# Patient Record
Sex: MALE | Race: WHITE | NOT HISPANIC OR LATINO | Employment: OTHER | URBAN - METROPOLITAN AREA
[De-identification: names, ages, dates, MRNs, and addresses within clinical notes are randomized per-mention and may not be internally consistent; named-entity substitution may affect disease eponyms.]

---

## 2019-01-08 ENCOUNTER — HOSPITAL ENCOUNTER (OUTPATIENT)
Dept: OTHER | Facility: HOSPITAL | Age: 69
Discharge: HOME | End: 2019-01-08

## 2019-01-08 LAB
INR PPP: 1.87 (ref 2–3)
PROTHROMBIN TIME: 17.8 S (ref 9.4–12)

## 2019-01-21 ENCOUNTER — HOSPITAL ENCOUNTER (OUTPATIENT)
Dept: OTHER | Facility: HOSPITAL | Age: 69
Discharge: HOME | End: 2019-01-21

## 2019-01-21 LAB
INR PPP: 2.36 (ref 2–3)
PROTHROMBIN TIME: 22.2 S (ref 9.4–12)

## 2019-02-05 ENCOUNTER — HOSPITAL ENCOUNTER (OUTPATIENT)
Dept: OTHER | Facility: HOSPITAL | Age: 69
Discharge: HOME | End: 2019-02-05

## 2019-02-05 LAB
INR PPP: 1.99 (ref 2–3)
PROTHROMBIN TIME: 18.9 S (ref 9.4–12)

## 2019-02-11 ENCOUNTER — HOSPITAL ENCOUNTER (OUTPATIENT)
Dept: OTHER | Facility: HOSPITAL | Age: 69
Discharge: HOME | End: 2019-02-11

## 2019-02-11 LAB
ALBUMIN SERPL-MCNC: 4.2 G/DL (ref 3.5–5)
ALBUMIN/GLOB SERPL: 1.6 {RATIO} (ref 1.4–2.6)
ALP SERPL-CCNC: 34 U/L (ref 56–155)
ALT SERPL-CCNC: 23 U/L (ref 10–40)
ANION GAP SERPL CALC-SCNC: 14 MMOL/L (ref 8–19)
AST SERPL-CCNC: 27 U/L (ref 15–50)
BASOPHILS # BLD AUTO: 0.01 10*3/UL (ref 0–0.2)
BASOPHILS NFR BLD AUTO: 0.15 % (ref 0–3)
BILIRUB SERPL-MCNC: 0.31 MG/DL (ref 0.2–1.3)
BUN SERPL-MCNC: 27 MG/DL (ref 5–25)
BUN/CREAT SERPL: 23 {RATIO} (ref 6–20)
CALCIUM SERPL-MCNC: 9 MG/DL (ref 8.7–10.4)
CHLORIDE SERPL-SCNC: 101 MMOL/L (ref 99–111)
CONV CO2: 28 MMOL/L (ref 22–32)
CONV TOTAL PROTEIN: 6.8 G/DL (ref 6.3–8.2)
CREAT UR-MCNC: 1.2 MG/DL (ref 0.7–1.2)
EOSINOPHIL # BLD AUTO: 0.14 10*3/UL (ref 0–0.7)
EOSINOPHIL # BLD AUTO: 2.05 % (ref 0–7)
ERYTHROCYTE [DISTWIDTH] IN BLOOD BY AUTOMATED COUNT: 11.2 % (ref 11.5–14.5)
GFR SERPLBLD BASED ON 1.73 SQ M-ARVRAT: >60 ML/MIN/{1.73_M2}
GLOBULIN UR ELPH-MCNC: 2.6 G/DL (ref 2–3.5)
GLUCOSE SERPL-MCNC: 99 MG/DL (ref 70–99)
HBA1C MFR BLD: 13.5 G/DL (ref 14–18)
HCT VFR BLD AUTO: 39.6 % (ref 42–52)
INR PPP: 2.04 (ref 2–3)
LYMPHOCYTES # BLD AUTO: 2.06 10*3/UL (ref 1–5)
MCH RBC QN AUTO: 32.6 PG (ref 27–31)
MCHC RBC AUTO-ENTMCNC: 34.1 G/DL (ref 33–37)
MCV RBC AUTO: 95.6 FL (ref 80–96)
MONOCYTES # BLD AUTO: 0.61 10*3/UL (ref 0.2–1.2)
MONOCYTES NFR BLD AUTO: 8.68 % (ref 3–10)
NEUTROPHILS # BLD AUTO: 4.16 10*3/UL (ref 2–8)
NEUTROPHILS NFR BLD AUTO: 59.6 % (ref 30–85)
NRBC BLD AUTO-RTO: 0 % (ref 0–0.01)
OSMOLALITY SERPL CALC.SUM OF ELEC: 293 MOSM/KG (ref 273–304)
PLATELET # BLD AUTO: 251 10*3/UL (ref 130–400)
PMV BLD AUTO: 6.7 FL (ref 7.4–10.4)
POTASSIUM SERPL-SCNC: 4.2 MMOL/L (ref 3.5–5.3)
PROTHROMBIN TIME: 19.4 S (ref 9.4–12)
RBC # BLD AUTO: 4.14 10*6/UL (ref 4.7–6.1)
SODIUM SERPL-SCNC: 139 MMOL/L (ref 135–147)
TSH SERPL-ACNC: 2.31 M[IU]/L (ref 0.27–4.2)
VARIANT LYMPHS NFR BLD MANUAL: 29.5 % (ref 20–45)
WBC # BLD AUTO: 6.98 10*3/UL (ref 4.8–10.8)

## 2019-02-25 ENCOUNTER — HOSPITAL ENCOUNTER (OUTPATIENT)
Dept: OTHER | Facility: HOSPITAL | Age: 69
Discharge: HOME | End: 2019-02-25

## 2019-02-25 LAB
INR PPP: 1.91 (ref 2–3)
PROTHROMBIN TIME: 18.2 S (ref 9.4–12)

## 2019-03-04 ENCOUNTER — HOSPITAL ENCOUNTER (OUTPATIENT)
Dept: OTHER | Facility: HOSPITAL | Age: 69
Discharge: HOME | End: 2019-03-04

## 2019-03-04 LAB
INR PPP: 2.39 (ref 2–3)
PROTHROMBIN TIME: 22.4 S (ref 9.4–12)

## 2019-03-20 ENCOUNTER — HOSPITAL ENCOUNTER (OUTPATIENT)
Dept: OTHER | Facility: HOSPITAL | Age: 69
Discharge: HOME | End: 2019-03-20

## 2019-03-20 LAB
INR PPP: 1.94 (ref 2–3)
PROTHROMBIN TIME: 18.5 S (ref 9.4–12)

## 2019-04-04 ENCOUNTER — HOSPITAL ENCOUNTER (OUTPATIENT)
Dept: OTHER | Facility: HOSPITAL | Age: 69
Discharge: HOME | End: 2019-04-04

## 2019-04-04 LAB
INR PPP: 1.45 (ref 2–3)
PROTHROMBIN TIME: 14.2 S (ref 9.4–12)

## 2019-05-13 ENCOUNTER — HOSPITAL ENCOUNTER (OUTPATIENT)
Dept: OTHER | Facility: HOSPITAL | Age: 69
Discharge: HOME | End: 2019-05-13

## 2019-05-13 DIAGNOSIS — E78.5 HYPERLIPIDEMIA, UNSPECIFIED HYPERLIPIDEMIA TYPE: ICD-10-CM

## 2019-05-13 DIAGNOSIS — I48.20 ATRIAL FIBRILLATION, CHRONIC (HCC): Primary | ICD-10-CM

## 2019-05-13 LAB
INR PPP: 1.84 (ref 2–3)
PROTHROMBIN TIME: 17.6 S (ref 9.4–12)

## 2019-05-17 ENCOUNTER — RESULTS ENCOUNTER (OUTPATIENT)
Dept: CARDIOLOGY | Facility: CLINIC | Age: 69
End: 2019-05-17

## 2019-05-17 DIAGNOSIS — I48.20 ATRIAL FIBRILLATION, CHRONIC (HCC): ICD-10-CM

## 2019-05-22 ENCOUNTER — OFFICE VISIT (OUTPATIENT)
Dept: CARDIOLOGY | Facility: CLINIC | Age: 69
End: 2019-05-22

## 2019-05-22 VITALS
SYSTOLIC BLOOD PRESSURE: 112 MMHG | HEART RATE: 50 BPM | BODY MASS INDEX: 24.08 KG/M2 | DIASTOLIC BLOOD PRESSURE: 72 MMHG | WEIGHT: 172 LBS | HEIGHT: 71 IN

## 2019-05-22 DIAGNOSIS — I48.0 PAROXYSMAL ATRIAL FIBRILLATION (HCC): ICD-10-CM

## 2019-05-22 DIAGNOSIS — I10 ESSENTIAL HYPERTENSION: ICD-10-CM

## 2019-05-22 DIAGNOSIS — E78.5 HYPERLIPIDEMIA LDL GOAL <100: ICD-10-CM

## 2019-05-22 DIAGNOSIS — I48.0 PAF (PAROXYSMAL ATRIAL FIBRILLATION) (HCC): Primary | ICD-10-CM

## 2019-05-22 PROBLEM — E03.9 THYROID ACTIVITY DECREASED: Status: ACTIVE | Noted: 2019-05-22

## 2019-05-22 PROBLEM — R00.1 BRADYCARDIA, SINUS: Status: ACTIVE | Noted: 2019-05-22

## 2019-05-22 PROCEDURE — 93000 ELECTROCARDIOGRAM COMPLETE: CPT | Performed by: INTERNAL MEDICINE

## 2019-05-22 PROCEDURE — 99213 OFFICE O/P EST LOW 20 MIN: CPT | Performed by: INTERNAL MEDICINE

## 2019-05-22 RX ORDER — IRBESARTAN 300 MG/1
300 TABLET ORAL DAILY
COMMUNITY
Start: 2018-01-07 | End: 2019-05-22 | Stop reason: SDUPTHER

## 2019-05-22 RX ORDER — PRAVASTATIN SODIUM 40 MG
40 TABLET ORAL DAILY
COMMUNITY
Start: 2018-01-21 | End: 2019-05-22 | Stop reason: SDUPTHER

## 2019-05-22 RX ORDER — LEVOTHYROXINE SODIUM 0.03 MG/1
37.5 TABLET ORAL DAILY
COMMUNITY
Start: 2018-01-19 | End: 2021-08-25

## 2019-05-22 RX ORDER — FINASTERIDE 5 MG/1
5 TABLET, FILM COATED ORAL DAILY
COMMUNITY
Start: 2018-01-09 | End: 2021-08-11

## 2019-05-22 RX ORDER — SULFASALAZINE 500 MG/1
500 TABLET ORAL DAILY
COMMUNITY
Start: 2017-10-31 | End: 2021-10-10

## 2019-05-22 RX ORDER — SOTALOL HYDROCHLORIDE 120 MG/1
60 TABLET ORAL 2 TIMES DAILY
Qty: 60 TABLET | Refills: 11 | Status: SHIPPED | OUTPATIENT
Start: 2019-05-22 | End: 2019-07-21

## 2019-05-22 RX ORDER — PRAVASTATIN SODIUM 40 MG
40 TABLET ORAL DAILY
Qty: 30 TABLET | Refills: 11 | Status: SHIPPED | OUTPATIENT
Start: 2019-05-22 | End: 2019-06-21

## 2019-05-22 RX ORDER — WARFARIN SODIUM 5 MG/1
5 TABLET ORAL NIGHTLY
Qty: 60 TABLET | Refills: 11 | Status: SHIPPED | OUTPATIENT
Start: 2019-05-22 | End: 2020-07-01

## 2019-05-22 RX ORDER — WARFARIN SODIUM 5 MG/1
5 TABLET ORAL
COMMUNITY
Start: 2018-01-26 | End: 2019-05-22 | Stop reason: SDUPTHER

## 2019-05-22 RX ORDER — SOTALOL HYDROCHLORIDE 120 MG/1
60 TABLET ORAL 2 TIMES DAILY
COMMUNITY
End: 2019-05-22 | Stop reason: SDUPTHER

## 2019-05-22 RX ORDER — IRBESARTAN 300 MG/1
300 TABLET ORAL DAILY
Qty: 30 TABLET | Refills: 11 | Status: SHIPPED | OUTPATIENT
Start: 2019-05-22 | End: 2019-06-21

## 2019-05-22 NOTE — ASSESSMENT & PLAN NOTE
He states that he has not had a lipid profile in over a year.  I will give him a lab slip to have this obtained

## 2019-05-22 NOTE — ASSESSMENT & PLAN NOTE
Currently in sinus rhythm.  I have refilled sotalol and warfarin.  His INR goal is 2-3  EKG in the office revealed sinus bradycardia, rate 50.  Corrected QT interval is 425 ms.  Nonspecific ST changes

## 2019-05-22 NOTE — PROGRESS NOTES
Subjective:     Encounter Date:05/22/2019      Patient ID: Victor Manuel Mendoza is a 69 y.o. male.    Chief Complaint:  Chief Complaint   Patient presents with   • Atrial Fibrillation   • Coronary Artery Disease       HPI:  I had the pleasure seeing Mr. Mendoza in the office today.  He is a 69-year-old gentleman with paroxysmal atrial fibrillation, hypertension and hyperlipidemia.  He states that he recently went to [place].  After he arrived he had a couple episodes of atrial fibrillation.  The duration was approximately 8 hours in total.  He was aware of the arrhythmia but had no significant shortness of air, chest discomfort or lower extremity edema.  He had no associated dizziness or near syncope.  He states that he felt that the atrial fibrillation was most likely related to the altitude and increased caffeine intake.  He is not complain of chest discomfort or shortness of air.  He is stable.  He is on warfarin and sotalol.  His blood pressure is well controlled.    The following portions of the patient's history were reviewed and updated as appropriate: allergies, current medications, past family history, past medical history, past social history, past surgical history and problem list.    Problem List:  Patient Active Problem List   Diagnosis   • Paroxysmal atrial fibrillation (CMS/HCC)   • Essential hypertension   • Hyperlipidemia LDL goal <100   • Thyroid activity decreased   • Bradycardia, sinus       Past Medical History:  Past Medical History:   Diagnosis Date   • Atrial fibrillation (CMS/HCC)    • Coronary artery disease    • Hyperlipidemia    • Hypertension    • Hypothyroidism        Past Surgical History:  Past Surgical History:   Procedure Laterality Date   • SHOULDER SURGERY     • SPINE SURGERY         Social History:  Social History     Socioeconomic History   • Marital status:      Spouse name: Not on file   • Number of children: Not on file   • Years of education: Not on file   • Highest  education level: Not on file   Tobacco Use   • Smoking status: Never Smoker   Substance and Sexual Activity   • Alcohol use: Yes     Drinks per session: 1 or 2   • Drug use: No   • Sexual activity: No     Partners: Female       Allergies:  No Known Allergies        ROS:  Review of Systems   Constitution: Negative for chills, decreased appetite, fever, malaise/fatigue, weight gain and weight loss.   HENT: Negative for congestion, hoarse voice, nosebleeds and sore throat.    Eyes: Negative for blurred vision, double vision and visual disturbance.   Cardiovascular: Positive for irregular heartbeat. Negative for chest pain, claudication, dyspnea on exertion, leg swelling, near-syncope, orthopnea, palpitations, paroxysmal nocturnal dyspnea and syncope.   Respiratory: Negative for cough, hemoptysis, shortness of breath, sleep disturbances due to breathing, snoring, sputum production and wheezing.    Endocrine: Negative for cold intolerance, heat intolerance, polydipsia and polyuria.   Hematologic/Lymphatic: Negative for adenopathy and bleeding problem. Bruises/bleeds easily.   Skin: Negative for flushing, itching, nail changes and rash.   Musculoskeletal: Positive for arthritis. Negative for back pain, joint pain, muscle cramps, muscle weakness, myalgias and neck pain.   Gastrointestinal: Positive for heartburn. Negative for bloating, abdominal pain, anorexia, change in bowel habit, constipation, diarrhea, hematemesis, hematochezia, jaundice, melena, nausea and vomiting.   Genitourinary: Negative for dysuria, hematuria and nocturia.   Neurological: Negative for brief paralysis, disturbances in coordination, excessive daytime sleepiness, dizziness, headaches, light-headedness, loss of balance, numbness, paresthesias, seizures and vertigo.   Psychiatric/Behavioral: Negative for altered mental status and depression. The patient is not nervous/anxious.    Allergic/Immunologic: Negative for environmental allergies and hives.  "         Objective:         /72   Pulse 50   Ht 180.3 cm (71\")   Wt 78 kg (172 lb)   BMI 23.99 kg/m²     Physical Exam   Constitutional: He is oriented to person, place, and time. He appears well-developed and well-nourished. No distress.   HENT:   Head: Normocephalic and atraumatic.   Mouth/Throat: Oropharynx is clear and moist.   Eyes: Conjunctivae and EOM are normal. Pupils are equal, round, and reactive to light. No scleral icterus.   Neck: Normal range of motion. Neck supple. No thyromegaly present.   Cardiovascular: Normal rate, regular rhythm, S1 normal, S2 normal and intact distal pulses.  No extrasystoles are present. PMI is not displaced. Exam reveals no gallop, no S3, no S4, no friction rub and no decreased pulses.   No murmur heard.  Pulses:       Carotid pulses are 2+ on the right side, and 2+ on the left side.       Dorsalis pedis pulses are 2+ on the right side, and 2+ on the left side.        Posterior tibial pulses are 2+ on the right side, and 2+ on the left side.   Pulmonary/Chest: Effort normal and breath sounds normal. No respiratory distress. He has no wheezes. He has no rales.   Abdominal: Soft. Bowel sounds are normal. He exhibits no distension and no mass. There is no tenderness. There is no rebound and no guarding.   Musculoskeletal: Normal range of motion. He exhibits no edema.   Lymphadenopathy:     He has no cervical adenopathy.   Neurological: He is alert and oriented to person, place, and time. Coordination normal.   Skin: Skin is warm and dry. No rash noted. He is not diaphoretic. No pallor.   Psychiatric: He has a normal mood and affect. His behavior is normal.   Nursing note and vitals reviewed.      In-Office Procedure(s):    ECG 12 Lead  Date/Time: 5/22/2019 12:38 PM  Performed by: Danielle Scott MD  Authorized by: Danielle Scott MD   Rhythm: sinus bradycardia  Other findings: non-specific ST-T wave changes  Comments: Corrected QT interval 425 ms            ASCVD " RIsk Score::  The ASCVD Risk score (Libbykarla MONZON Jr., et al., 2013) failed to calculate for the following reasons:    Cannot find a previous HDL lab    Cannot find a previous total cholesterol lab    Recent Radiology:  Imaging Results (most recent)     None          Lab Review:   No visits with results within 6 Month(s) from this visit.   Latest known visit with results is:   No results found for any previous visit.                    Problems Addressed this Visit        Cardiovascular and Mediastinum    Paroxysmal atrial fibrillation (CMS/HCC)     Currently in sinus rhythm.  I have refilled sotalol and warfarin.  His INR goal is 2-3  EKG in the office revealed sinus bradycardia, rate 50.  Corrected QT interval is 425 ms.  Nonspecific ST changes         Relevant Medications    sotalol (BETAPACE) 120 MG tablet    Other Relevant Orders    ECG 12 Lead    Essential hypertension     Controlled.  I did refill his Avapro         Relevant Medications    irbesartan (AVAPRO) 300 MG tablet    sotalol (BETAPACE) 120 MG tablet    Hyperlipidemia LDL goal <100     He states that he has not had a lipid profile in over a year.  I will give him a lab slip to have this obtained         Relevant Medications    pravastatin (PRAVACHOL) 40 MG tablet    Other Relevant Orders    Lipid Panel      Other Visit Diagnoses     PAF (paroxysmal atrial fibrillation) (CMS/HCC)    -  Primary    Relevant Medications    sotalol (BETAPACE) 120 MG tablet    warfarin (COUMADIN) 5 MG tablet          Danielle Scott MD  05/22/19  .

## 2019-05-23 ENCOUNTER — ANTICOAGULATION VISIT (OUTPATIENT)
Dept: CARDIOLOGY | Facility: CLINIC | Age: 69
End: 2019-05-23

## 2019-05-23 ENCOUNTER — HOSPITAL ENCOUNTER (OUTPATIENT)
Dept: OTHER | Facility: HOSPITAL | Age: 69
Discharge: HOME | End: 2019-05-23

## 2019-05-23 DIAGNOSIS — I48.0 PAROXYSMAL ATRIAL FIBRILLATION (HCC): ICD-10-CM

## 2019-05-23 DIAGNOSIS — Z79.01 LONG TERM (CURRENT) USE OF ANTICOAGULANTS: ICD-10-CM

## 2019-05-23 LAB
CHOLEST SERPL-MCNC: 140 MG/DL (ref 107–200)
CHOLEST/HDLC SERPL: 2.9 {RATIO} (ref 3–6)
HDLC SERPL-MCNC: 48 MG/DL (ref 40–60)
INR PPP: 3.2 (ref 2–3)
INR PPP: 3.21 (ref 2–3)
LDLC SERPL CALC-MCNC: 73 MG/DL (ref 70–100)
PROTHROMBIN TIME: 29.8 S (ref 9.4–12)
TRIGL SERPL-MCNC: 94 MG/DL (ref 40–150)
VLDLC SERPL-MCNC: 19 MG/DL (ref 5–37)

## 2019-05-24 PROBLEM — Z79.01 LONG TERM (CURRENT) USE OF ANTICOAGULANTS: Status: ACTIVE | Noted: 2019-05-24

## 2019-06-06 ENCOUNTER — HOSPITAL ENCOUNTER (OUTPATIENT)
Dept: OTHER | Facility: HOSPITAL | Age: 69
Discharge: HOME | End: 2019-06-06
Attending: FAMILY MEDICINE

## 2019-06-06 LAB
BASOPHILS # BLD AUTO: 0.01 10*3/UL (ref 0–0.2)
BASOPHILS NFR BLD AUTO: 0.2 % (ref 0–3)
CONV ABS IMM GRAN: 0.02 10*3/UL (ref 0–0.2)
CONV IMMATURE GRAN: 0.5 % (ref 0–1.8)
DEPRECATED RDW RBC AUTO: 46.2 FL (ref 35.1–43.9)
EOSINOPHIL # BLD AUTO: 0.12 10*3/UL (ref 0–0.7)
EOSINOPHIL # BLD AUTO: 2.8 % (ref 0–7)
ERYTHROCYTE [DISTWIDTH] IN BLOOD BY AUTOMATED COUNT: 12.5 % (ref 11.6–14.4)
HBA1C MFR BLD: 12.8 G/DL (ref 14–18)
HCT VFR BLD AUTO: 39.2 % (ref 42–52)
INR PPP: 2.49 (ref 2–3)
LYMPHOCYTES # BLD AUTO: 1.53 10*3/UL (ref 1–5)
MCH RBC QN AUTO: 32.8 PG (ref 27–31)
MCHC RBC AUTO-ENTMCNC: 32.7 G/DL (ref 33–37)
MCV RBC AUTO: 100.5 FL (ref 80–96)
MONOCYTES # BLD AUTO: 0.49 10*3/UL (ref 0.2–1.2)
MONOCYTES NFR BLD AUTO: 11.3 % (ref 3–10)
NEUTROPHILS # BLD AUTO: 2.17 10*3/UL (ref 2–8)
NEUTROPHILS NFR BLD AUTO: 49.9 % (ref 30–85)
NRBC CBCN: 0 % (ref 0–0.7)
PLATELET # BLD AUTO: 220 10*3/UL (ref 130–400)
PMV BLD AUTO: 9.7 FL (ref 9.4–12.4)
PROTHROMBIN TIME: 23.4 S (ref 9.4–12)
RBC # BLD AUTO: 3.9 10*6/UL (ref 4.7–6.1)
TSH SERPL-ACNC: 2.55 M[IU]/L (ref 0.27–4.2)
VARIANT LYMPHS NFR BLD MANUAL: 35.3 % (ref 20–45)
WBC # BLD AUTO: 4.34 10*3/UL (ref 4.8–10.8)

## 2019-06-07 LAB — HCV AB SER DONR QL: <0.1 S/CO RATIO (ref 0–0.9)

## 2019-07-09 ENCOUNTER — HOSPITAL ENCOUNTER (OUTPATIENT)
Dept: OTHER | Facility: HOSPITAL | Age: 69
Discharge: HOME | End: 2019-07-09

## 2019-07-09 LAB
INR PPP: 1.78 (ref 2–3)
PROTHROMBIN TIME: 17 S (ref 9.4–12)

## 2019-09-09 ENCOUNTER — HOSPITAL ENCOUNTER (OUTPATIENT)
Dept: OTHER | Facility: HOSPITAL | Age: 69
Discharge: HOME | End: 2019-09-09

## 2019-09-09 LAB
INR PPP: 2.6
INR PPP: 2.6 (ref 2–3)
PROTHROMBIN TIME: 24.3 S (ref 9.4–12)

## 2019-09-10 ENCOUNTER — ANTICOAGULATION VISIT (OUTPATIENT)
Dept: CARDIOLOGY | Facility: CLINIC | Age: 69
End: 2019-09-10

## 2019-09-10 DIAGNOSIS — I48.0 PAROXYSMAL ATRIAL FIBRILLATION (HCC): ICD-10-CM

## 2019-09-10 DIAGNOSIS — Z79.01 LONG TERM (CURRENT) USE OF ANTICOAGULANTS: ICD-10-CM

## 2019-10-14 ENCOUNTER — HOSPITAL ENCOUNTER (OUTPATIENT)
Dept: OTHER | Facility: HOSPITAL | Age: 69
Discharge: HOME | End: 2019-10-14

## 2019-10-14 LAB
INR PPP: 1.72 (ref 2–3)
PROTHROMBIN TIME: 17.2 S (ref 9.4–12)

## 2019-10-15 LAB — INR PPP: 1.72

## 2019-10-16 ENCOUNTER — ANTICOAGULATION VISIT (OUTPATIENT)
Dept: CARDIOLOGY | Facility: CLINIC | Age: 69
End: 2019-10-16

## 2019-10-16 DIAGNOSIS — I48.0 PAROXYSMAL ATRIAL FIBRILLATION (HCC): ICD-10-CM

## 2019-10-16 DIAGNOSIS — Z79.01 LONG TERM (CURRENT) USE OF ANTICOAGULANTS: ICD-10-CM

## 2019-11-05 ENCOUNTER — HOSPITAL ENCOUNTER (OUTPATIENT)
Dept: OTHER | Facility: HOSPITAL | Age: 69
Discharge: HOME | End: 2019-11-05

## 2019-11-05 LAB
INR PPP: 2.74 (ref 2–3)
PROTHROMBIN TIME: 26.6 S (ref 9.4–12)

## 2019-11-08 ENCOUNTER — HOSPITAL ENCOUNTER (OUTPATIENT)
Dept: SLEEP MEDICINE | Facility: HOSPITAL | Age: 69
Discharge: HOME | End: 2019-11-08
Attending: INTERNAL MEDICINE

## 2019-11-12 ENCOUNTER — HOSPITAL ENCOUNTER (OUTPATIENT)
Dept: OTHER | Facility: HOSPITAL | Age: 69
Discharge: HOME | End: 2019-11-12

## 2019-11-12 LAB
INR PPP: 2.88 (ref 2–3)
INR PPP: 2.88 (ref 2–3)
PROTHROMBIN TIME: 28 S (ref 9.4–12)

## 2019-11-13 ENCOUNTER — OFFICE VISIT (OUTPATIENT)
Dept: CARDIOLOGY | Facility: CLINIC | Age: 69
End: 2019-11-13

## 2019-11-13 ENCOUNTER — ANTICOAGULATION VISIT (OUTPATIENT)
Dept: CARDIOLOGY | Facility: CLINIC | Age: 69
End: 2019-11-13

## 2019-11-13 VITALS
BODY MASS INDEX: 25.06 KG/M2 | SYSTOLIC BLOOD PRESSURE: 138 MMHG | WEIGHT: 179 LBS | DIASTOLIC BLOOD PRESSURE: 88 MMHG | HEIGHT: 71 IN | HEART RATE: 53 BPM

## 2019-11-13 DIAGNOSIS — Z79.899 LONG TERM CURRENT USE OF ANTIARRHYTHMIC MEDICAL THERAPY: Primary | ICD-10-CM

## 2019-11-13 DIAGNOSIS — E78.5 DYSLIPIDEMIA: ICD-10-CM

## 2019-11-13 DIAGNOSIS — I48.0 PAROXYSMAL ATRIAL FIBRILLATION (HCC): ICD-10-CM

## 2019-11-13 DIAGNOSIS — R00.1 BRADYCARDIA, SINUS: ICD-10-CM

## 2019-11-13 DIAGNOSIS — I10 ESSENTIAL HYPERTENSION: ICD-10-CM

## 2019-11-13 DIAGNOSIS — Z79.01 LONG TERM (CURRENT) USE OF ANTICOAGULANTS: ICD-10-CM

## 2019-11-13 DIAGNOSIS — E78.5 HYPERLIPIDEMIA LDL GOAL <100: ICD-10-CM

## 2019-11-13 PROCEDURE — 99213 OFFICE O/P EST LOW 20 MIN: CPT | Performed by: INTERNAL MEDICINE

## 2019-11-13 PROCEDURE — 93000 ELECTROCARDIOGRAM COMPLETE: CPT | Performed by: INTERNAL MEDICINE

## 2019-11-13 RX ORDER — SOTALOL HYDROCHLORIDE 120 MG/1
0.5 TABLET ORAL 2 TIMES DAILY
Refills: 3 | COMMUNITY
Start: 2019-10-18 | End: 2019-11-13

## 2019-11-13 RX ORDER — CITALOPRAM 10 MG/1
1 TABLET ORAL DAILY
Refills: 5 | COMMUNITY
Start: 2019-10-11 | End: 2021-12-03

## 2019-11-13 RX ORDER — IRBESARTAN 300 MG/1
1 TABLET ORAL DAILY
Refills: 0 | COMMUNITY
Start: 2019-10-11 | End: 2019-11-14 | Stop reason: SDUPTHER

## 2019-11-13 RX ORDER — PRAVASTATIN SODIUM 40 MG
40 TABLET ORAL DAILY
Refills: 11 | COMMUNITY
Start: 2019-11-08 | End: 2020-08-13

## 2019-11-13 NOTE — PROGRESS NOTES
"  Subjective:     Encounter Date:11/13/2019      Patient ID: Victor Manuel Mendoza is a 69 y.o. male.    Chief Complaint:  Chief Complaint   Patient presents with   • Atrial Fibrillation       HPI:  History of Present Illness    The following portions of the patient's history were reviewed and updated as appropriate: {history reviewed:20406::\"allergies\",\"current medications\",\"past family history\",\"past medical history\",\"past social history\",\"past surgical history\",\"problem list\"}.    Problem List:  Patient Active Problem List   Diagnosis   • Paroxysmal atrial fibrillation (CMS/HCC)   • Essential hypertension   • Hyperlipidemia LDL goal <100   • Thyroid activity decreased   • Bradycardia, sinus   • Long term (current) use of anticoagulants [Z79.01]       Past Medical History:  Past Medical History:   Diagnosis Date   • Atrial fibrillation (CMS/HCC)    • Coronary artery disease    • Hyperlipidemia    • Hypertension    • Hypothyroidism        Past Surgical History:  Past Surgical History:   Procedure Laterality Date   • SHOULDER SURGERY     • SPINE SURGERY         Social History:  Social History     Socioeconomic History   • Marital status:      Spouse name: Not on file   • Number of children: Not on file   • Years of education: Not on file   • Highest education level: Not on file   Tobacco Use   • Smoking status: Never Smoker   Substance and Sexual Activity   • Alcohol use: Yes     Drinks per session: 1 or 2   • Drug use: No   • Sexual activity: No     Partners: Female       Allergies:  No Known Allergies    Immunizations:    There is no immunization history on file for this patient.    ROS:  ROS       Objective:         /88   Pulse 53   Ht 180.3 cm (71\")   Wt 81.2 kg (179 lb)   BMI 24.97 kg/m²     Physical Exam    In-Office Procedure(s):  Procedures    ASCVD RIsk Score::  The ASCVD Risk score (Libby NA Daugherty, et al., 2013) failed to calculate for the following reasons:    Cannot find a previous HDL lab    Cannot " "find a previous total cholesterol lab    Recent Radiology:  Imaging Results (Most Recent)     None          Lab Review:   {Recent labs:67406::\"not applicable\"}             Assessment:         No diagnosis found.       Plan:            Level of Care:                 Danielle Scott MD  11/13/19  .  "

## 2019-11-13 NOTE — PROGRESS NOTES
Subjective:     Encounter Date:11/13/2019      Patient ID: Victor Manuel Mendoza is a 69 y.o. male.    Chief Complaint:  Chief Complaint   Patient presents with   • Atrial Fibrillation       HPI:  History of Present Illness  I had the pleasure of seeing Mr. Mendoza in the office today.  He is a very pleasant gentleman with a history paroxysmal atrial fibrillation, hypertension and hyperlipidemia.  He is on sotalol.    Mr. Mendoza is in the office today for monitoring of his atrial fibrillation and high blood pressure.  He has had no episodes of palpitations.  He is bradycardic with a heart rate in the 40s.  He is asymptomatic.  He denies symptoms of dizziness or near syncope.  His blood pressure is mildly elevated in the office today and he states that he does not monitor his pressure at home.  He is not feeling short of air and he has no complaints of chest discomfort or lower extremity edema.  He states that he feels well from a cardiac standpoint.  I did review his lipid profile from May 2019.  His triglycerides were 94, total cholesterol 140, HDL 48 and LDL 73.  He is on pravastatin.    The following portions of the patient's history were reviewed and updated as appropriate: allergies, current medications, past family history, past medical history, past social history, past surgical history and problem list.    Problem List:  Patient Active Problem List   Diagnosis   • Paroxysmal atrial fibrillation (CMS/HCC)   • Essential hypertension   • Thyroid activity decreased   • Bradycardia, sinus   • Long term (current) use of anticoagulants [Z79.01]   • Dyslipidemia       Past Medical History:  Past Medical History:   Diagnosis Date   • Atrial fibrillation (CMS/HCC)    • Coronary artery disease    • Hyperlipidemia    • Hypertension    • Hypothyroidism        Past Surgical History:  Past Surgical History:   Procedure Laterality Date   • SHOULDER SURGERY     • SPINE SURGERY         Social History:  Social History     Socioeconomic  History   • Marital status:      Spouse name: Not on file   • Number of children: Not on file   • Years of education: Not on file   • Highest education level: Not on file   Tobacco Use   • Smoking status: Never Smoker   Substance and Sexual Activity   • Alcohol use: Yes     Drinks per session: 1 or 2   • Drug use: No   • Sexual activity: No     Partners: Female       Allergies:  No Known Allergies    Immunizations:    There is no immunization history on file for this patient.    ROS:  Review of Systems   Constitution: Negative for chills, decreased appetite, fever, malaise/fatigue, weight gain and weight loss.   HENT: Negative for congestion, hoarse voice, nosebleeds and sore throat.    Eyes: Negative for blurred vision, double vision and visual disturbance.   Cardiovascular: Negative for chest pain, claudication, dyspnea on exertion, irregular heartbeat, leg swelling, near-syncope, orthopnea, palpitations, paroxysmal nocturnal dyspnea and syncope.   Respiratory: Negative for cough, hemoptysis, shortness of breath, sleep disturbances due to breathing, snoring, sputum production and wheezing.    Endocrine: Negative for cold intolerance, heat intolerance, polydipsia and polyuria.   Hematologic/Lymphatic: Negative for adenopathy and bleeding problem. Does not bruise/bleed easily.   Skin: Negative for flushing, itching, nail changes and rash.   Musculoskeletal: Negative for arthritis, back pain, joint pain, muscle cramps, muscle weakness, myalgias and neck pain.   Gastrointestinal: Negative for bloating, abdominal pain, anorexia, change in bowel habit, constipation, diarrhea, heartburn, hematemesis, hematochezia, jaundice, melena, nausea and vomiting.   Genitourinary: Negative for dysuria, hematuria and nocturia.   Neurological: Negative for brief paralysis, disturbances in coordination, excessive daytime sleepiness, dizziness, headaches, light-headedness, loss of balance, numbness, paresthesias, seizures and  "vertigo.   Psychiatric/Behavioral: Negative for altered mental status and depression. The patient is not nervous/anxious.    Allergic/Immunologic: Negative for environmental allergies and hives.          Objective:         /88   Pulse 53   Ht 180.3 cm (71\")   Wt 81.2 kg (179 lb)   BMI 24.97 kg/m²     Physical Exam   Constitutional: He is oriented to person, place, and time. He appears well-developed and well-nourished. No distress.   HENT:   Head: Normocephalic and atraumatic.   Mouth/Throat: Oropharynx is clear and moist.   Eyes: Conjunctivae and EOM are normal. Pupils are equal, round, and reactive to light. No scleral icterus.   Neck: Normal range of motion. Neck supple. No thyromegaly present.   Cardiovascular: Regular rhythm, S1 normal, S2 normal and intact distal pulses.  No extrasystoles are present. Bradycardia present. PMI is not displaced. Exam reveals no gallop, no S3, no S4, no friction rub and no decreased pulses.   No murmur heard.  Pulses:       Carotid pulses are 2+ on the right side, and 2+ on the left side.       Dorsalis pedis pulses are 2+ on the right side, and 2+ on the left side.        Posterior tibial pulses are 2+ on the right side, and 2+ on the left side.   Pulmonary/Chest: Effort normal and breath sounds normal. No respiratory distress. He has no wheezes. He has no rales.   Abdominal: Soft. Bowel sounds are normal. He exhibits no distension and no mass. There is no tenderness. There is no rebound and no guarding.   Musculoskeletal: Normal range of motion. He exhibits no edema.   Lymphadenopathy:     He has no cervical adenopathy.   Neurological: He is alert and oriented to person, place, and time. Coordination normal.   Skin: Skin is warm and dry. No rash noted. He is not diaphoretic. No pallor.   Psychiatric: He has a normal mood and affect. His behavior is normal.   Nursing note and vitals reviewed.      In-Office Procedure(s):    ECG 12 Lead  Date/Time: 11/13/2019 1:40 " PM  Performed by: Danielle Scott MD  Authorized by: Danielle Scott MD   Comparison: compared with previous ECG from 5/22/2019  Similar to previous ECG  Comments: Sinus bradycardia, rate 45, otherwise normal             ASCVD RIsk Score::  The ASCVD Risk score (Olden NA Jr., et al., 2013) failed to calculate for the following reasons:    Cannot find a previous HDL lab    Cannot find a previous total cholesterol lab    Recent Radiology:  Imaging Results (Most Recent)     None          Lab Review:   not applicable             Assessment:          Diagnosis Plan   1. Bradycardia, sinus     2. Essential hypertension     3. Hyperlipidemia LDL goal <100     4. Dyslipidemia     5. Paroxysmal atrial fibrillation (CMS/[facility])            Plan:   1.  Sinus bradycardia  Asymptomatic.  I am going to decrease his sotalol to 40 mg twice daily    2.  Essential hypertension  Blood pressure is mildly elevated in the office today.  I have asked him to monitor his pressure at home and call me at the end of the week.  His pressure should be less than 130/80.  I have discussed lifestyle modifications including sodium restriction to help lower his blood pressure    3.  Dyslipidemia  His LDL in May 2019 was 73.  He is on pravastatin    4.  Paroxysmal atrial fibrillation  He is on sotalol at 60 mg twice daily.  He has not had any episodes of atrial fibrillation since his trip to [place].  I am going to decrease his dose to 40 mg twice daily secondary to his heart rate.  His QT interval on his EKG today was 427 ms        Level of Care:                 Danielle Scott MD  11/13/19  .

## 2019-11-14 RX ORDER — IRBESARTAN 300 MG/1
300 TABLET ORAL DAILY
Qty: 90 TABLET | Refills: 3 | Status: SHIPPED | OUTPATIENT
Start: 2019-11-14 | End: 2020-11-18

## 2019-11-20 ENCOUNTER — HOSPITAL ENCOUNTER (OUTPATIENT)
Dept: OTHER | Facility: HOSPITAL | Age: 69
Discharge: HOME | End: 2019-11-20

## 2019-11-20 LAB
ALBUMIN SERPL-MCNC: 4.3 G/DL (ref 3.5–5)
ALBUMIN/GLOB SERPL: 1.5 {RATIO} (ref 1.4–2.6)
ALP SERPL-CCNC: 37 U/L (ref 56–155)
ALT SERPL-CCNC: 18 U/L (ref 10–40)
ANION GAP SERPL CALC-SCNC: 22 MMOL/L (ref 8–19)
AST SERPL-CCNC: 25 U/L (ref 15–50)
BASOPHILS # BLD AUTO: 0.02 10*3/UL (ref 0–0.2)
BASOPHILS NFR BLD AUTO: 0.4 % (ref 0–3)
BILIRUB SERPL-MCNC: 0.51 MG/DL (ref 0.2–1.3)
BUN SERPL-MCNC: 20 MG/DL (ref 5–25)
BUN/CREAT SERPL: 18 {RATIO} (ref 6–20)
CALCIUM SERPL-MCNC: 9.2 MG/DL (ref 8.7–10.4)
CHLORIDE SERPL-SCNC: 107 MMOL/L (ref 99–111)
CHOLEST SERPL-MCNC: 151 MG/DL (ref 107–200)
CHOLEST/HDLC SERPL: 3.1 {RATIO} (ref 3–6)
CONV ABS IMM GRAN: 0.01 10*3/UL (ref 0–0.2)
CONV CO2: 21 MMOL/L (ref 22–32)
CONV IMMATURE GRAN: 0.2 % (ref 0–1.8)
CONV TOTAL PROTEIN: 7.1 G/DL (ref 6.3–8.2)
CREAT UR-MCNC: 1.1 MG/DL (ref 0.7–1.2)
DEPRECATED RDW RBC AUTO: 48.6 FL (ref 35.1–43.9)
EOSINOPHIL # BLD AUTO: 0.09 10*3/UL (ref 0–0.7)
EOSINOPHIL # BLD AUTO: 2 % (ref 0–7)
ERYTHROCYTE [DISTWIDTH] IN BLOOD BY AUTOMATED COUNT: 13.2 % (ref 11.6–14.4)
GFR SERPLBLD BASED ON 1.73 SQ M-ARVRAT: >60 ML/MIN/{1.73_M2}
GLOBULIN UR ELPH-MCNC: 2.8 G/DL (ref 2–3.5)
GLUCOSE SERPL-MCNC: 91 MG/DL (ref 70–99)
HCT VFR BLD AUTO: 42 % (ref 42–52)
HDLC SERPL-MCNC: 49 MG/DL (ref 40–60)
HGB BLD-MCNC: 13.3 G/DL (ref 14–18)
INR PPP: 3.07
INR PPP: 3.07 (ref 2–3)
LDLC SERPL CALC-MCNC: 88 MG/DL (ref 70–100)
LYMPHOCYTES # BLD AUTO: 1.57 10*3/UL (ref 1–5)
LYMPHOCYTES NFR BLD AUTO: 35 % (ref 20–45)
MCH RBC QN AUTO: 31.9 PG (ref 27–31)
MCHC RBC AUTO-ENTMCNC: 31.7 G/DL (ref 33–37)
MCV RBC AUTO: 100.7 FL (ref 80–96)
MONOCYTES # BLD AUTO: 0.55 10*3/UL (ref 0.2–1.2)
MONOCYTES NFR BLD AUTO: 12.2 % (ref 3–10)
NEUTROPHILS # BLD AUTO: 2.25 10*3/UL (ref 2–8)
NEUTROPHILS NFR BLD AUTO: 50.2 % (ref 30–85)
NRBC CBCN: 0 % (ref 0–0.7)
OSMOLALITY SERPL CALC.SUM OF ELEC: 302 MOSM/KG (ref 273–304)
PLATELET # BLD AUTO: 236 10*3/UL (ref 130–400)
PMV BLD AUTO: 10 FL (ref 9.4–12.4)
POTASSIUM SERPL-SCNC: 4.8 MMOL/L (ref 3.5–5.3)
PROTHROMBIN TIME: 29.8 S (ref 9.4–12)
PSA SERPL-MCNC: 0.92 NG/ML (ref 0–4)
RBC # BLD AUTO: 4.17 10*6/UL (ref 4.7–6.1)
SODIUM SERPL-SCNC: 145 MMOL/L (ref 135–147)
TRIGL SERPL-MCNC: 69 MG/DL (ref 40–150)
TSH SERPL-ACNC: 2.71 M[IU]/L (ref 0.27–4.2)
VLDLC SERPL-MCNC: 14 MG/DL (ref 5–37)
WBC # BLD AUTO: 4.49 10*3/UL (ref 4.8–10.8)

## 2019-11-21 ENCOUNTER — TELEPHONE (OUTPATIENT)
Dept: CARDIOLOGY | Facility: CLINIC | Age: 69
End: 2019-11-21

## 2019-11-21 ENCOUNTER — ANTICOAGULATION VISIT (OUTPATIENT)
Dept: CARDIOLOGY | Facility: CLINIC | Age: 69
End: 2019-11-21

## 2019-11-21 DIAGNOSIS — I48.0 PAROXYSMAL ATRIAL FIBRILLATION (HCC): ICD-10-CM

## 2019-11-21 DIAGNOSIS — Z79.01 LONG TERM (CURRENT) USE OF ANTICOAGULANTS: ICD-10-CM

## 2019-11-21 NOTE — TELEPHONE ENCOUNTER
Spoke with the patient, he had labs done yesterday that Dr. Rodriguez ordered &  wanted a copy, he wanted to see if we received them. I had not at the time, but I have since received them, they are in your in box for review.

## 2019-11-27 ENCOUNTER — ANTICOAGULATION VISIT (OUTPATIENT)
Dept: CARDIOLOGY | Facility: CLINIC | Age: 69
End: 2019-11-27

## 2019-11-27 ENCOUNTER — HOSPITAL ENCOUNTER (OUTPATIENT)
Dept: OTHER | Facility: HOSPITAL | Age: 69
Discharge: HOME | End: 2019-11-27

## 2019-11-27 DIAGNOSIS — I48.0 PAROXYSMAL ATRIAL FIBRILLATION (HCC): ICD-10-CM

## 2019-11-27 DIAGNOSIS — Z79.01 LONG TERM (CURRENT) USE OF ANTICOAGULANTS: ICD-10-CM

## 2019-11-27 LAB
INR PPP: 1.91 (ref 2–3)
INR PPP: 1.91 (ref 2–3)
PROTHROMBIN TIME: 19 S (ref 9.4–12)

## 2019-12-09 ENCOUNTER — HOSPITAL ENCOUNTER (OUTPATIENT)
Dept: OTHER | Facility: HOSPITAL | Age: 69
Discharge: HOME | End: 2019-12-09

## 2019-12-09 LAB
INR PPP: 1.47
INR PPP: 1.47 (ref 2–3)
PROTHROMBIN TIME: 14.9 S (ref 9.4–12)

## 2019-12-10 ENCOUNTER — ANTICOAGULATION VISIT (OUTPATIENT)
Dept: CARDIOLOGY | Facility: CLINIC | Age: 69
End: 2019-12-10

## 2019-12-10 DIAGNOSIS — I48.0 PAROXYSMAL ATRIAL FIBRILLATION (HCC): ICD-10-CM

## 2019-12-10 DIAGNOSIS — Z79.01 LONG TERM (CURRENT) USE OF ANTICOAGULANTS: ICD-10-CM

## 2019-12-10 NOTE — PROGRESS NOTES
Spoke with patient, He is unable to check Friday he will be out of town and will recheck on the following Monday.

## 2019-12-23 ENCOUNTER — HOSPITAL ENCOUNTER (OUTPATIENT)
Dept: OTHER | Facility: HOSPITAL | Age: 69
Discharge: HOME | End: 2019-12-23

## 2019-12-23 LAB
INR PPP: 1.7 (ref 2–3)
PROTHROMBIN TIME: 17 S (ref 9.4–12)

## 2019-12-30 ENCOUNTER — HOSPITAL ENCOUNTER (OUTPATIENT)
Dept: OTHER | Facility: HOSPITAL | Age: 69
Discharge: HOME | End: 2019-12-30

## 2019-12-30 ENCOUNTER — ANTICOAGULATION VISIT (OUTPATIENT)
Dept: CARDIOLOGY | Facility: CLINIC | Age: 69
End: 2019-12-30

## 2019-12-30 DIAGNOSIS — Z79.01 LONG TERM (CURRENT) USE OF ANTICOAGULANTS: ICD-10-CM

## 2019-12-30 DIAGNOSIS — I48.0 PAROXYSMAL ATRIAL FIBRILLATION (HCC): ICD-10-CM

## 2019-12-30 LAB
INR PPP: 1.6 (ref 2–3)
INR PPP: 1.6 (ref 2–3)
PROTHROMBIN TIME: 16.1 S (ref 9.4–12)

## 2020-01-15 ENCOUNTER — ANTICOAGULATION VISIT (OUTPATIENT)
Dept: CARDIOLOGY | Facility: CLINIC | Age: 70
End: 2020-01-15

## 2020-01-15 ENCOUNTER — HOSPITAL ENCOUNTER (OUTPATIENT)
Dept: OTHER | Facility: HOSPITAL | Age: 70
Discharge: HOME | End: 2020-01-15

## 2020-01-15 DIAGNOSIS — I48.0 PAROXYSMAL ATRIAL FIBRILLATION (HCC): ICD-10-CM

## 2020-01-15 DIAGNOSIS — Z79.01 LONG TERM (CURRENT) USE OF ANTICOAGULANTS: ICD-10-CM

## 2020-01-15 LAB
INR PPP: 2.8
INR PPP: 2.8 (ref 2–3)
PROTHROMBIN TIME: 27.3 S (ref 9.4–12)

## 2020-02-07 ENCOUNTER — HOSPITAL ENCOUNTER (OUTPATIENT)
Dept: OTHER | Facility: HOSPITAL | Age: 70
Discharge: HOME | End: 2020-02-07

## 2020-02-07 LAB
INR PPP: 2.5 (ref 2–3)
PROTHROMBIN TIME: 24.4 S (ref 9.4–12)

## 2020-02-11 ENCOUNTER — ANTICOAGULATION VISIT (OUTPATIENT)
Dept: CARDIOLOGY | Facility: CLINIC | Age: 70
End: 2020-02-11

## 2020-02-11 DIAGNOSIS — Z79.01 LONG TERM (CURRENT) USE OF ANTICOAGULANTS: ICD-10-CM

## 2020-02-11 DIAGNOSIS — I48.0 PAROXYSMAL ATRIAL FIBRILLATION (HCC): ICD-10-CM

## 2020-02-11 LAB — INR PPP: 2.5

## 2020-03-09 ENCOUNTER — HOSPITAL ENCOUNTER (OUTPATIENT)
Dept: OTHER | Facility: HOSPITAL | Age: 70
Discharge: HOME | End: 2020-03-09

## 2020-03-09 LAB
INR PPP: 1.5
INR PPP: 1.5 (ref 2–3)
PROTHROMBIN TIME: 15.2 S (ref 9.4–12)

## 2020-03-10 ENCOUNTER — ANTICOAGULATION VISIT (OUTPATIENT)
Dept: CARDIOLOGY | Facility: CLINIC | Age: 70
End: 2020-03-10

## 2020-03-10 DIAGNOSIS — Z79.01 LONG TERM (CURRENT) USE OF ANTICOAGULANTS: ICD-10-CM

## 2020-03-10 DIAGNOSIS — I48.0 PAROXYSMAL ATRIAL FIBRILLATION (HCC): ICD-10-CM

## 2020-04-23 ENCOUNTER — HOSPITAL ENCOUNTER (OUTPATIENT)
Dept: LAB | Facility: HOSPITAL | Age: 70
Discharge: HOME | End: 2020-04-23

## 2020-04-23 LAB
INR PPP: 3.29 (ref 2–3)
PROTHROMBIN TIME: 31.9 S (ref 9.4–12)

## 2020-04-24 ENCOUNTER — ANTICOAGULATION VISIT (OUTPATIENT)
Dept: CARDIOLOGY | Facility: CLINIC | Age: 70
End: 2020-04-24

## 2020-04-24 DIAGNOSIS — I48.0 PAROXYSMAL ATRIAL FIBRILLATION (HCC): ICD-10-CM

## 2020-04-24 DIAGNOSIS — Z79.01 LONG TERM (CURRENT) USE OF ANTICOAGULANTS: ICD-10-CM

## 2020-04-24 LAB — INR PPP: 3.29

## 2020-05-11 ENCOUNTER — HOSPITAL ENCOUNTER (OUTPATIENT)
Dept: LAB | Facility: HOSPITAL | Age: 70
Discharge: HOME | End: 2020-05-11

## 2020-05-11 LAB
INR PPP: 3.19 (ref 2–3)
PROTHROMBIN TIME: 31 S (ref 9.4–12)

## 2020-05-13 ENCOUNTER — TELEMEDICINE (OUTPATIENT)
Dept: CARDIOLOGY | Facility: CLINIC | Age: 70
End: 2020-05-13

## 2020-05-13 ENCOUNTER — ANTICOAGULATION VISIT (OUTPATIENT)
Dept: CARDIOLOGY | Facility: CLINIC | Age: 70
End: 2020-05-13

## 2020-05-13 VITALS — DIASTOLIC BLOOD PRESSURE: 94 MMHG | SYSTOLIC BLOOD PRESSURE: 161 MMHG | HEART RATE: 68 BPM

## 2020-05-13 DIAGNOSIS — Z79.01 LONG TERM (CURRENT) USE OF ANTICOAGULANTS: ICD-10-CM

## 2020-05-13 DIAGNOSIS — I48.0 PAROXYSMAL ATRIAL FIBRILLATION (HCC): Primary | ICD-10-CM

## 2020-05-13 DIAGNOSIS — R00.1 BRADYCARDIA, SINUS: ICD-10-CM

## 2020-05-13 DIAGNOSIS — I48.0 PAROXYSMAL ATRIAL FIBRILLATION (HCC): ICD-10-CM

## 2020-05-13 DIAGNOSIS — I10 ESSENTIAL HYPERTENSION: ICD-10-CM

## 2020-05-13 DIAGNOSIS — E78.5 DYSLIPIDEMIA: ICD-10-CM

## 2020-05-13 LAB
INR PPP: 3.19
INR PPP: 3.19

## 2020-05-13 PROCEDURE — 99214 OFFICE O/P EST MOD 30 MIN: CPT | Performed by: INTERNAL MEDICINE

## 2020-05-13 RX ORDER — PERPHENAZINE/AMITRIPTYLINE HCL 2 MG-25 MG
TABLET ORAL
COMMUNITY

## 2020-05-13 NOTE — PROGRESS NOTES
Subjective:     Encounter Date:05/13/2020      Patient ID: Victor Manuel Mendoza is a 70 y.o. male.    Chief Complaint:  Chief Complaint   Patient presents with   • Follow-up       HPI:  History of Present Illness     You have chosen to receive care through a telehealth visit.  Do you consent to use a video/audio connection for your medical care today? Yes    I had a video visit with Mr. Mendoza today. He is a very pleasant gentleman with a history paroxysmal atrial fibrillation, hypertension and hyperlipidemia.  He is on Sotalol.  He also has sleep apnea and uses CPAP.    Mr. Mendoza states that he is feeling well.  He is not complaining of chest discomfort or shortness of air.  He has noted no palpitations, dizziness or near syncope.  He does have some lower extremity edema at the end of the day when he takes his socks off.  This is gone by in the morning.  He is tolerating his medication well.  His blood pressure is elevated today.  He has not been monitoring it at home but he is going to check his pressure over the next couple days and call me with the readings.  I suspect we will have to adjust his medications.    Victor Manuel is practicing social distancing.  He has had no known exposure to COVID-19.  He denies fever, chills, cough, shortness of air, GI symptoms, loss of sense of taste or smell or change in coloration of his toes.    The following portions of the patient's history were reviewed and updated as appropriate: allergies, current medications, past family history, past medical history, past social history, past surgical history and problem list.    Problem List:  Patient Active Problem List   Diagnosis   • Paroxysmal atrial fibrillation (CMS/HCC)   • Essential hypertension   • Thyroid activity decreased   • Bradycardia, sinus   • Long term (current) use of anticoagulants [Z79.01]   • Dyslipidemia       Past Medical History:  Past Medical History:   Diagnosis Date   • Atrial fibrillation (CMS/HCC)    • Coronary artery  disease    • Hyperlipidemia    • Hypertension    • Hypothyroidism        Past Surgical History:  Past Surgical History:   Procedure Laterality Date   • SHOULDER SURGERY     • SPINE SURGERY         Social History:  Social History     Socioeconomic History   • Marital status:      Spouse name: Not on file   • Number of children: Not on file   • Years of education: Not on file   • Highest education level: Not on file   Tobacco Use   • Smoking status: Never Smoker   Substance and Sexual Activity   • Alcohol use: Yes     Drinks per session: 1 or 2   • Drug use: No   • Sexual activity: Never     Partners: Female       Allergies:  No Known Allergies    Immunizations:    There is no immunization history on file for this patient.    ROS:  Review of Systems   Constitution: Negative for chills, decreased appetite, fever, malaise/fatigue, weight gain and weight loss.   HENT: Negative for congestion, hoarse voice, nosebleeds and sore throat.    Eyes: Negative for blurred vision, double vision and visual disturbance.   Cardiovascular: Negative for chest pain, claudication, dyspnea on exertion, irregular heartbeat, leg swelling, near-syncope, orthopnea, palpitations, paroxysmal nocturnal dyspnea and syncope.   Respiratory: Negative for cough, hemoptysis, shortness of breath, sleep disturbances due to breathing, snoring, sputum production and wheezing.    Endocrine: Negative for cold intolerance, heat intolerance, polydipsia and polyuria.   Hematologic/Lymphatic: Negative for adenopathy and bleeding problem. Does not bruise/bleed easily.   Skin: Negative for flushing, itching, nail changes and rash.   Musculoskeletal: Negative for arthritis, back pain, joint pain, muscle cramps, muscle weakness, myalgias and neck pain.   Gastrointestinal: Negative for bloating, abdominal pain, anorexia, change in bowel habit, constipation, diarrhea, heartburn, hematemesis, hematochezia, jaundice, melena, nausea and vomiting.   Genitourinary:  Negative for dysuria, hematuria and nocturia.   Neurological: Negative for brief paralysis, disturbances in coordination, excessive daytime sleepiness, dizziness, headaches, light-headedness, loss of balance, numbness, paresthesias, seizures and vertigo.   Psychiatric/Behavioral: Negative for altered mental status and depression. The patient is not nervous/anxious.    Allergic/Immunologic: Negative for environmental allergies and hives.          Objective:         /94   Pulse 68     Physical Exam  Unable to perform physical exam secondary to video visit  In-Office Procedure(s):  Procedures    ASCVD RIsk Score::  The ASCVD Risk score (Libby NA Jr., et al., 2013) failed to calculate for the following reasons:    Cannot find a previous HDL lab    Cannot find a previous total cholesterol lab    Recent Radiology:  Imaging Results (Most Recent)     None          Lab Review:   not applicable             Assessment:          Diagnosis Plan   1. Paroxysmal atrial fibrillation (CMS/HCC)     2. Essential hypertension     3. Long term (current) use of anticoagulants [Z79.01]     4. Dyslipidemia     5. Bradycardia, sinus            Plan:   Overall, Victor Manuel is doing well.  He will call the office in a couple of days with blood pressure readings.  His blood pressure goal is less than 130/80.  His INR therapeutic range is 2-3.  I will see him in the office in 6 months or sooner if needed.    Length of video visit was 15 minutes  EMR documentation 10 minutes    Level of Care:                 Danielle Scott MD  05/13/20  .

## 2020-05-14 ENCOUNTER — ANTICOAGULATION VISIT (OUTPATIENT)
Dept: CARDIOLOGY | Facility: CLINIC | Age: 70
End: 2020-05-14

## 2020-05-14 DIAGNOSIS — I48.0 PAROXYSMAL ATRIAL FIBRILLATION (HCC): ICD-10-CM

## 2020-05-14 DIAGNOSIS — Z79.01 LONG TERM (CURRENT) USE OF ANTICOAGULANTS: ICD-10-CM

## 2020-06-01 ENCOUNTER — HOSPITAL ENCOUNTER (OUTPATIENT)
Dept: LAB | Facility: HOSPITAL | Age: 70
Discharge: HOME | End: 2020-06-01

## 2020-06-01 LAB
INR PPP: 2.73 (ref 2–3)
PROTHROMBIN TIME: 26.6 S (ref 9.4–12)

## 2020-06-02 ENCOUNTER — ANTICOAGULATION VISIT (OUTPATIENT)
Dept: CARDIOLOGY | Facility: CLINIC | Age: 70
End: 2020-06-02

## 2020-06-02 DIAGNOSIS — Z79.01 LONG TERM (CURRENT) USE OF ANTICOAGULANTS: ICD-10-CM

## 2020-06-02 DIAGNOSIS — I48.0 PAROXYSMAL ATRIAL FIBRILLATION (HCC): ICD-10-CM

## 2020-06-02 LAB — INR PPP: 2.73

## 2020-06-29 ENCOUNTER — HOSPITAL ENCOUNTER (OUTPATIENT)
Dept: LAB | Facility: HOSPITAL | Age: 70
Discharge: HOME | End: 2020-06-29

## 2020-06-29 LAB
INR PPP: 3.36 (ref 2–3)
PROTHROMBIN TIME: 32.6 S (ref 9.4–12)

## 2020-07-01 DIAGNOSIS — I48.0 PAF (PAROXYSMAL ATRIAL FIBRILLATION) (HCC): ICD-10-CM

## 2020-07-01 RX ORDER — WARFARIN SODIUM 5 MG/1
TABLET ORAL
Qty: 90 TABLET | OUTPATIENT
Start: 2020-07-01

## 2020-07-01 RX ORDER — WARFARIN SODIUM 5 MG/1
5 TABLET ORAL NIGHTLY
Qty: 30 TABLET | Refills: 0 | Status: SHIPPED | OUTPATIENT
Start: 2020-07-01 | End: 2020-07-30

## 2020-07-02 ENCOUNTER — ANTICOAGULATION VISIT (OUTPATIENT)
Dept: CARDIOLOGY | Facility: CLINIC | Age: 70
End: 2020-07-02

## 2020-07-02 DIAGNOSIS — Z79.01 LONG TERM (CURRENT) USE OF ANTICOAGULANTS: ICD-10-CM

## 2020-07-02 DIAGNOSIS — I48.0 PAROXYSMAL ATRIAL FIBRILLATION (HCC): ICD-10-CM

## 2020-07-02 LAB — INR PPP: 3.36

## 2020-07-24 ENCOUNTER — HOSPITAL ENCOUNTER (OUTPATIENT)
Dept: LAB | Facility: HOSPITAL | Age: 70
Discharge: HOME | End: 2020-07-24

## 2020-07-24 ENCOUNTER — ANTICOAGULATION VISIT (OUTPATIENT)
Dept: CARDIOLOGY | Facility: CLINIC | Age: 70
End: 2020-07-24

## 2020-07-24 DIAGNOSIS — I48.0 PAROXYSMAL ATRIAL FIBRILLATION (HCC): ICD-10-CM

## 2020-07-24 DIAGNOSIS — Z79.01 LONG TERM (CURRENT) USE OF ANTICOAGULANTS: ICD-10-CM

## 2020-07-24 LAB
INR PPP: 3.19 (ref 2–3)
INR PPP: 3.19 (ref 2–3)
PROTHROMBIN TIME: 31 S (ref 9.4–12)

## 2020-07-29 DIAGNOSIS — I48.0 PAF (PAROXYSMAL ATRIAL FIBRILLATION) (HCC): ICD-10-CM

## 2020-07-30 RX ORDER — WARFARIN SODIUM 5 MG/1
TABLET ORAL
Qty: 30 TABLET | Refills: 5 | Status: SHIPPED | OUTPATIENT
Start: 2020-07-30 | End: 2021-01-28

## 2020-08-03 ENCOUNTER — ANTICOAGULATION VISIT (OUTPATIENT)
Dept: CARDIOLOGY | Facility: CLINIC | Age: 70
End: 2020-08-03

## 2020-08-03 ENCOUNTER — HOSPITAL ENCOUNTER (OUTPATIENT)
Dept: LAB | Facility: HOSPITAL | Age: 70
Discharge: HOME | End: 2020-08-03

## 2020-08-03 ENCOUNTER — TELEPHONE (OUTPATIENT)
Dept: CARDIOLOGY | Facility: CLINIC | Age: 70
End: 2020-08-03

## 2020-08-03 DIAGNOSIS — I48.0 PAROXYSMAL ATRIAL FIBRILLATION (HCC): ICD-10-CM

## 2020-08-03 DIAGNOSIS — Z79.01 LONG TERM (CURRENT) USE OF ANTICOAGULANTS: ICD-10-CM

## 2020-08-03 DIAGNOSIS — I48.0 PAROXYSMAL ATRIAL FIBRILLATION (HCC): Primary | ICD-10-CM

## 2020-08-03 LAB
INR PPP: 1.7 (ref 2–3)
INR PPP: 1.7 (ref 2–3)
PROTHROMBIN TIME: 17 S (ref 9.4–12)

## 2020-08-07 ENCOUNTER — RESULTS ENCOUNTER (OUTPATIENT)
Dept: CARDIOLOGY | Facility: CLINIC | Age: 70
End: 2020-08-07

## 2020-08-07 DIAGNOSIS — I48.0 PAROXYSMAL ATRIAL FIBRILLATION (HCC): ICD-10-CM

## 2020-08-13 RX ORDER — PRAVASTATIN SODIUM 40 MG
TABLET ORAL
Qty: 30 TABLET | Refills: 5 | Status: SHIPPED | OUTPATIENT
Start: 2020-08-13 | End: 2021-02-15

## 2020-08-14 ENCOUNTER — RESULTS ENCOUNTER (OUTPATIENT)
Dept: CARDIOLOGY | Facility: CLINIC | Age: 70
End: 2020-08-14

## 2020-08-14 DIAGNOSIS — I48.0 PAROXYSMAL ATRIAL FIBRILLATION (HCC): ICD-10-CM

## 2020-08-21 ENCOUNTER — RESULTS ENCOUNTER (OUTPATIENT)
Dept: CARDIOLOGY | Facility: CLINIC | Age: 70
End: 2020-08-21

## 2020-08-21 DIAGNOSIS — I48.0 PAROXYSMAL ATRIAL FIBRILLATION (HCC): ICD-10-CM

## 2020-08-28 ENCOUNTER — RESULTS ENCOUNTER (OUTPATIENT)
Dept: CARDIOLOGY | Facility: CLINIC | Age: 70
End: 2020-08-28

## 2020-08-28 DIAGNOSIS — I48.0 PAROXYSMAL ATRIAL FIBRILLATION (HCC): ICD-10-CM

## 2020-09-04 ENCOUNTER — ANTICOAGULATION VISIT (OUTPATIENT)
Dept: CARDIOLOGY | Facility: CLINIC | Age: 70
End: 2020-09-04

## 2020-09-04 ENCOUNTER — RESULTS ENCOUNTER (OUTPATIENT)
Dept: CARDIOLOGY | Facility: CLINIC | Age: 70
End: 2020-09-04

## 2020-09-04 ENCOUNTER — HOSPITAL ENCOUNTER (OUTPATIENT)
Dept: LAB | Facility: HOSPITAL | Age: 70
Discharge: HOME | End: 2020-09-04

## 2020-09-04 DIAGNOSIS — I48.0 PAROXYSMAL ATRIAL FIBRILLATION (HCC): ICD-10-CM

## 2020-09-04 DIAGNOSIS — Z79.01 LONG TERM (CURRENT) USE OF ANTICOAGULANTS: ICD-10-CM

## 2020-09-04 LAB
INR PPP: 2 (ref 2–3)
INR PPP: 2 (ref 2–3)
PROTHROMBIN TIME: 19.8 S (ref 9.4–12)

## 2020-09-11 ENCOUNTER — RESULTS ENCOUNTER (OUTPATIENT)
Dept: CARDIOLOGY | Facility: CLINIC | Age: 70
End: 2020-09-11

## 2020-09-11 DIAGNOSIS — I48.0 PAROXYSMAL ATRIAL FIBRILLATION (HCC): ICD-10-CM

## 2020-09-18 ENCOUNTER — RESULTS ENCOUNTER (OUTPATIENT)
Dept: CARDIOLOGY | Facility: CLINIC | Age: 70
End: 2020-09-18

## 2020-09-18 DIAGNOSIS — I48.0 PAROXYSMAL ATRIAL FIBRILLATION (HCC): ICD-10-CM

## 2020-09-25 ENCOUNTER — RESULTS ENCOUNTER (OUTPATIENT)
Dept: CARDIOLOGY | Facility: CLINIC | Age: 70
End: 2020-09-25

## 2020-09-25 DIAGNOSIS — I48.0 PAROXYSMAL ATRIAL FIBRILLATION (HCC): ICD-10-CM

## 2020-10-02 ENCOUNTER — RESULTS ENCOUNTER (OUTPATIENT)
Dept: CARDIOLOGY | Facility: CLINIC | Age: 70
End: 2020-10-02

## 2020-10-02 DIAGNOSIS — I48.0 PAROXYSMAL ATRIAL FIBRILLATION (HCC): ICD-10-CM

## 2020-10-09 ENCOUNTER — RESULTS ENCOUNTER (OUTPATIENT)
Dept: CARDIOLOGY | Facility: CLINIC | Age: 70
End: 2020-10-09

## 2020-10-09 DIAGNOSIS — I48.0 PAROXYSMAL ATRIAL FIBRILLATION (HCC): ICD-10-CM

## 2020-10-15 ENCOUNTER — ANTICOAGULATION VISIT (OUTPATIENT)
Dept: CARDIOLOGY | Facility: CLINIC | Age: 70
End: 2020-10-15

## 2020-10-15 ENCOUNTER — HOSPITAL ENCOUNTER (OUTPATIENT)
Dept: LAB | Facility: HOSPITAL | Age: 70
Discharge: HOME | End: 2020-10-15

## 2020-10-15 DIAGNOSIS — Z79.01 LONG TERM (CURRENT) USE OF ANTICOAGULANTS: ICD-10-CM

## 2020-10-15 DIAGNOSIS — I48.0 PAROXYSMAL ATRIAL FIBRILLATION (HCC): ICD-10-CM

## 2020-10-15 LAB
INR PPP: 3.29
INR PPP: 3.29 (ref 2–3)
PROTHROMBIN TIME: 31.9 S (ref 9.4–12)

## 2020-10-16 ENCOUNTER — RESULTS ENCOUNTER (OUTPATIENT)
Dept: CARDIOLOGY | Facility: CLINIC | Age: 70
End: 2020-10-16

## 2020-10-16 DIAGNOSIS — I48.0 PAROXYSMAL ATRIAL FIBRILLATION (HCC): ICD-10-CM

## 2020-10-23 ENCOUNTER — RESULTS ENCOUNTER (OUTPATIENT)
Dept: CARDIOLOGY | Facility: CLINIC | Age: 70
End: 2020-10-23

## 2020-10-23 DIAGNOSIS — I48.0 PAROXYSMAL ATRIAL FIBRILLATION (HCC): ICD-10-CM

## 2020-10-30 ENCOUNTER — RESULTS ENCOUNTER (OUTPATIENT)
Dept: CARDIOLOGY | Facility: CLINIC | Age: 70
End: 2020-10-30

## 2020-10-30 DIAGNOSIS — I48.0 PAROXYSMAL ATRIAL FIBRILLATION (HCC): ICD-10-CM

## 2020-11-06 ENCOUNTER — RESULTS ENCOUNTER (OUTPATIENT)
Dept: CARDIOLOGY | Facility: CLINIC | Age: 70
End: 2020-11-06

## 2020-11-06 DIAGNOSIS — I48.0 PAROXYSMAL ATRIAL FIBRILLATION (HCC): ICD-10-CM

## 2020-11-13 ENCOUNTER — RESULTS ENCOUNTER (OUTPATIENT)
Dept: CARDIOLOGY | Facility: CLINIC | Age: 70
End: 2020-11-13

## 2020-11-13 DIAGNOSIS — I48.0 PAROXYSMAL ATRIAL FIBRILLATION (HCC): ICD-10-CM

## 2020-11-18 RX ORDER — IRBESARTAN 300 MG/1
300 TABLET ORAL DAILY
Qty: 90 TABLET | Refills: 3 | Status: SHIPPED | OUTPATIENT
Start: 2020-11-18 | End: 2021-07-27 | Stop reason: SDUPTHER

## 2020-11-20 ENCOUNTER — RESULTS ENCOUNTER (OUTPATIENT)
Dept: CARDIOLOGY | Facility: CLINIC | Age: 70
End: 2020-11-20

## 2020-11-20 DIAGNOSIS — I48.0 PAROXYSMAL ATRIAL FIBRILLATION (HCC): ICD-10-CM

## 2020-11-23 ENCOUNTER — HOSPITAL ENCOUNTER (OUTPATIENT)
Dept: FAMILY MEDICINE CLINIC | Facility: CLINIC | Age: 70
Discharge: HOME | End: 2020-11-23
Attending: FAMILY MEDICINE

## 2020-11-23 LAB
ALBUMIN SERPL-MCNC: 4.3 G/DL (ref 3.5–5)
ALBUMIN/GLOB SERPL: 1.7 {RATIO} (ref 1.4–2.6)
ALP SERPL-CCNC: 34 U/L (ref 56–155)
ALT SERPL-CCNC: 15 U/L (ref 10–40)
ANION GAP SERPL CALC-SCNC: 16 MMOL/L (ref 8–19)
AST SERPL-CCNC: 26 U/L (ref 15–50)
BASOPHILS # BLD AUTO: 0.02 10*3/UL (ref 0–0.2)
BASOPHILS NFR BLD AUTO: 0.3 % (ref 0–3)
BILIRUB SERPL-MCNC: 0.31 MG/DL (ref 0.2–1.3)
BUN SERPL-MCNC: 25 MG/DL (ref 5–25)
BUN/CREAT SERPL: 20 {RATIO} (ref 6–20)
CALCIUM SERPL-MCNC: 8.8 MG/DL (ref 8.7–10.4)
CHLORIDE SERPL-SCNC: 100 MMOL/L (ref 99–111)
CHOLEST SERPL-MCNC: 142 MG/DL (ref 107–200)
CHOLEST/HDLC SERPL: 3.3 {RATIO} (ref 3–6)
CONV ABS IMM GRAN: 0.03 10*3/UL (ref 0–0.2)
CONV CO2: 25 MMOL/L (ref 22–32)
CONV IMMATURE GRAN: 0.4 % (ref 0–1.8)
CONV TOTAL PROTEIN: 6.8 G/DL (ref 6.3–8.2)
CREAT UR-MCNC: 1.27 MG/DL (ref 0.7–1.2)
DEPRECATED RDW RBC AUTO: 47.7 FL (ref 35.1–43.9)
EOSINOPHIL # BLD AUTO: 0.13 10*3/UL (ref 0–0.7)
EOSINOPHIL # BLD AUTO: 1.7 % (ref 0–7)
ERYTHROCYTE [DISTWIDTH] IN BLOOD BY AUTOMATED COUNT: 12.6 % (ref 11.6–14.4)
GFR SERPLBLD BASED ON 1.73 SQ M-ARVRAT: 56 ML/MIN/{1.73_M2}
GLOBULIN UR ELPH-MCNC: 2.5 G/DL (ref 2–3.5)
GLUCOSE SERPL-MCNC: 77 MG/DL (ref 70–99)
HCT VFR BLD AUTO: 41 % (ref 42–52)
HDLC SERPL-MCNC: 43 MG/DL (ref 40–60)
HGB BLD-MCNC: 13 G/DL (ref 14–18)
LDLC SERPL CALC-MCNC: 76 MG/DL (ref 70–100)
LYMPHOCYTES # BLD AUTO: 1.97 10*3/UL (ref 1–5)
LYMPHOCYTES NFR BLD AUTO: 25.7 % (ref 20–45)
MCH RBC QN AUTO: 32.1 PG (ref 27–31)
MCHC RBC AUTO-ENTMCNC: 31.7 G/DL (ref 33–37)
MCV RBC AUTO: 101.2 FL (ref 80–96)
MONOCYTES # BLD AUTO: 0.73 10*3/UL (ref 0.2–1.2)
MONOCYTES NFR BLD AUTO: 9.5 % (ref 3–10)
NEUTROPHILS # BLD AUTO: 4.79 10*3/UL (ref 2–8)
NEUTROPHILS NFR BLD AUTO: 62.4 % (ref 30–85)
NRBC CBCN: 0 % (ref 0–0.7)
OSMOLALITY SERPL CALC.SUM OF ELEC: 283 MOSM/KG (ref 273–304)
PLATELET # BLD AUTO: 240 10*3/UL (ref 130–400)
PMV BLD AUTO: 9.4 FL (ref 9.4–12.4)
POTASSIUM SERPL-SCNC: 5.5 MMOL/L (ref 3.5–5.3)
PSA SERPL-MCNC: 1.38 NG/ML (ref 0–4)
RBC # BLD AUTO: 4.05 10*6/UL (ref 4.7–6.1)
SODIUM SERPL-SCNC: 135 MMOL/L (ref 135–147)
TRIGL SERPL-MCNC: 117 MG/DL (ref 40–150)
TSH SERPL-ACNC: 3.61 M[IU]/L (ref 0.27–4.2)
VLDLC SERPL-MCNC: 23 MG/DL (ref 5–37)
WBC # BLD AUTO: 7.67 10*3/UL (ref 4.8–10.8)

## 2020-11-27 ENCOUNTER — RESULTS ENCOUNTER (OUTPATIENT)
Dept: CARDIOLOGY | Facility: CLINIC | Age: 70
End: 2020-11-27

## 2020-11-27 DIAGNOSIS — I48.0 PAROXYSMAL ATRIAL FIBRILLATION (HCC): ICD-10-CM

## 2020-12-01 DIAGNOSIS — I48.0 PAROXYSMAL ATRIAL FIBRILLATION (HCC): Primary | ICD-10-CM

## 2020-12-01 RX ORDER — SOTALOL HYDROCHLORIDE 80 MG/1
TABLET ORAL
Qty: 60 TABLET | Refills: 5 | Status: SHIPPED | OUTPATIENT
Start: 2020-12-01 | End: 2021-07-26

## 2020-12-04 ENCOUNTER — HOSPITAL ENCOUNTER (OUTPATIENT)
Dept: LAB | Facility: HOSPITAL | Age: 70
Discharge: HOME | End: 2020-12-04

## 2020-12-04 ENCOUNTER — RESULTS ENCOUNTER (OUTPATIENT)
Dept: CARDIOLOGY | Facility: CLINIC | Age: 70
End: 2020-12-04

## 2020-12-04 DIAGNOSIS — I48.0 PAROXYSMAL ATRIAL FIBRILLATION (HCC): ICD-10-CM

## 2020-12-04 LAB
INR PPP: 2.23 (ref 2–3)
PROTHROMBIN TIME: 21.9 S (ref 9.4–12)

## 2020-12-11 ENCOUNTER — RESULTS ENCOUNTER (OUTPATIENT)
Dept: CARDIOLOGY | Facility: CLINIC | Age: 70
End: 2020-12-11

## 2020-12-11 DIAGNOSIS — I48.0 PAROXYSMAL ATRIAL FIBRILLATION (HCC): ICD-10-CM

## 2020-12-18 ENCOUNTER — HOSPITAL ENCOUNTER (OUTPATIENT)
Dept: FAMILY MEDICINE CLINIC | Facility: CLINIC | Age: 70
Discharge: HOME | End: 2020-12-18
Attending: FAMILY MEDICINE

## 2020-12-18 ENCOUNTER — RESULTS ENCOUNTER (OUTPATIENT)
Dept: CARDIOLOGY | Facility: CLINIC | Age: 70
End: 2020-12-18

## 2020-12-18 DIAGNOSIS — I48.0 PAROXYSMAL ATRIAL FIBRILLATION (HCC): ICD-10-CM

## 2020-12-18 LAB
ANION GAP SERPL CALC-SCNC: 12 MMOL/L (ref 8–19)
BUN SERPL-MCNC: 21 MG/DL (ref 5–25)
BUN/CREAT SERPL: 17 {RATIO} (ref 6–20)
CALCIUM SERPL-MCNC: 8.8 MG/DL (ref 8.7–10.4)
CHLORIDE SERPL-SCNC: 105 MMOL/L (ref 99–111)
CONV CO2: 27 MMOL/L (ref 22–32)
CREAT UR-MCNC: 1.26 MG/DL (ref 0.7–1.2)
FOLATE SERPL-MCNC: >20 NG/ML (ref 4.8–20)
GFR SERPLBLD BASED ON 1.73 SQ M-ARVRAT: 57 ML/MIN/{1.73_M2}
GLUCOSE SERPL-MCNC: 78 MG/DL (ref 70–99)
OSMOLALITY SERPL CALC.SUM OF ELEC: 290 MOSM/KG (ref 273–304)
POTASSIUM SERPL-SCNC: 4.6 MMOL/L (ref 3.5–5.3)
SODIUM SERPL-SCNC: 139 MMOL/L (ref 135–147)
VIT B12 SERPL-MCNC: 935 PG/ML (ref 211–911)

## 2020-12-25 ENCOUNTER — RESULTS ENCOUNTER (OUTPATIENT)
Dept: CARDIOLOGY | Facility: CLINIC | Age: 70
End: 2020-12-25

## 2020-12-25 DIAGNOSIS — I48.0 PAROXYSMAL ATRIAL FIBRILLATION (HCC): ICD-10-CM

## 2020-12-28 ENCOUNTER — TELEPHONE (OUTPATIENT)
Dept: CARDIOLOGY | Facility: CLINIC | Age: 70
End: 2020-12-28

## 2020-12-28 DIAGNOSIS — I48.0 PAROXYSMAL ATRIAL FIBRILLATION (HCC): ICD-10-CM

## 2020-12-28 DIAGNOSIS — E78.5 DYSLIPIDEMIA: Primary | ICD-10-CM

## 2020-12-28 NOTE — TELEPHONE ENCOUNTER
CPA PT    PT has tele visit on 1/4/21 and asking to send order for blood work to [facility] before apt.

## 2020-12-31 ENCOUNTER — HOSPITAL ENCOUNTER (OUTPATIENT)
Dept: LAB | Facility: HOSPITAL | Age: 70
Discharge: HOME | End: 2020-12-31

## 2020-12-31 LAB
ALBUMIN SERPL-MCNC: 4 G/DL (ref 3.5–5)
ALBUMIN/GLOB SERPL: 1.7 {RATIO} (ref 1.4–2.6)
ALP SERPL-CCNC: 37 U/L (ref 56–155)
ALT SERPL-CCNC: 22 U/L (ref 10–40)
ANION GAP SERPL CALC-SCNC: 13 MMOL/L (ref 8–19)
AST SERPL-CCNC: 28 U/L (ref 15–50)
BILIRUB SERPL-MCNC: 0.53 MG/DL (ref 0.2–1.3)
BUN SERPL-MCNC: 20 MG/DL (ref 5–25)
BUN/CREAT SERPL: 17 {RATIO} (ref 6–20)
CALCIUM SERPL-MCNC: 8.8 MG/DL (ref 8.7–10.4)
CHLORIDE SERPL-SCNC: 105 MMOL/L (ref 99–111)
CHOLEST SERPL-MCNC: 146 MG/DL (ref 107–200)
CHOLEST/HDLC SERPL: 3.4 {RATIO} (ref 3–6)
CONV CO2: 26 MMOL/L (ref 22–32)
CONV TOTAL PROTEIN: 6.4 G/DL (ref 6.3–8.2)
CREAT UR-MCNC: 1.18 MG/DL (ref 0.7–1.2)
GFR SERPLBLD BASED ON 1.73 SQ M-ARVRAT: >60 ML/MIN/{1.73_M2}
GLOBULIN UR ELPH-MCNC: 2.4 G/DL (ref 2–3.5)
GLUCOSE SERPL-MCNC: 89 MG/DL (ref 70–99)
HDLC SERPL-MCNC: 43 MG/DL (ref 40–60)
INR PPP: 2.27
INR PPP: 2.27 (ref 2–3)
LDLC SERPL CALC-MCNC: 82 MG/DL (ref 70–100)
MAGNESIUM SERPL-MCNC: 2 MG/DL (ref 1.6–2.3)
OSMOLALITY SERPL CALC.SUM OF ELEC: 292 MOSM/KG (ref 273–304)
POTASSIUM SERPL-SCNC: 4.1 MMOL/L (ref 3.5–5.3)
PROTHROMBIN TIME: 22.3 S (ref 9.4–12)
SODIUM SERPL-SCNC: 140 MMOL/L (ref 135–147)
TRIGL SERPL-MCNC: 103 MG/DL (ref 40–150)
VLDLC SERPL-MCNC: 21 MG/DL (ref 5–37)

## 2021-01-03 PROBLEM — I48.0 PAROXYSMAL ATRIAL FIBRILLATION (HCC): Chronic | Status: ACTIVE | Noted: 2019-05-22

## 2021-01-03 PROBLEM — E78.5 DYSLIPIDEMIA: Chronic | Status: ACTIVE | Noted: 2019-11-13

## 2021-01-03 PROBLEM — I10 ESSENTIAL HYPERTENSION: Chronic | Status: ACTIVE | Noted: 2019-05-22

## 2021-01-04 ENCOUNTER — TELEMEDICINE (OUTPATIENT)
Dept: CARDIOLOGY | Facility: CLINIC | Age: 71
End: 2021-01-04

## 2021-01-04 ENCOUNTER — ANTICOAGULATION VISIT (OUTPATIENT)
Dept: CARDIOLOGY | Facility: CLINIC | Age: 71
End: 2021-01-04

## 2021-01-04 VITALS — BODY MASS INDEX: 24.38 KG/M2 | HEIGHT: 72 IN | WEIGHT: 180 LBS

## 2021-01-04 DIAGNOSIS — I48.0 PAROXYSMAL ATRIAL FIBRILLATION (HCC): Chronic | ICD-10-CM

## 2021-01-04 DIAGNOSIS — I10 ESSENTIAL HYPERTENSION: Primary | Chronic | ICD-10-CM

## 2021-01-04 DIAGNOSIS — Z79.01 LONG TERM (CURRENT) USE OF ANTICOAGULANTS: ICD-10-CM

## 2021-01-04 DIAGNOSIS — E78.5 DYSLIPIDEMIA: Chronic | ICD-10-CM

## 2021-01-04 DIAGNOSIS — I48.0 PAROXYSMAL ATRIAL FIBRILLATION (HCC): ICD-10-CM

## 2021-01-04 PROCEDURE — 99214 OFFICE O/P EST MOD 30 MIN: CPT | Performed by: INTERNAL MEDICINE

## 2021-01-04 NOTE — PROGRESS NOTES
"  Subjective:     Encounter Date:01/04/2021      Patient ID: Victor Manuel Mendoza is a 70 y.o. male.    Chief Complaint:  Chief Complaint   Patient presents with   • Follow-up       HPI:  History of Present Illness     You have chosen to receive care through a telehealth visit.  Do you consent to use a video/audio connection for your medical care today? Yes    I had a video visit with Mr. Mendoza today. He is a very pleasant gentleman with a history paroxysmal atrial fibrillation, hypertension and hyperlipidemia.  He is on sotalol.    Mr. Mendoza states that he is feeling well.  He does have occasional palpitations which he describes as irregular heartbeats.  These do not cause any symptoms of shortness of breath or chest discomfort.  No dizziness or near syncope.  He is on warfarin and sotalol.  He is not complaining of dyspnea or dyspnea on exertion.  No chest discomfort.  He does have lower extremity edema at the end of the day but states it is generally gone by morning.    He is practicing social distancing and following CDC guidelines.  He has had no known exposure to COVID.  He is currently without symptoms of new onset cough, fever or chills, loss of sense of taste or smell.    There is no immunization history on file for this patient.    ROS:  Review of Systems   Cardiovascular: Positive for irregular heartbeat, leg swelling and palpitations.          Objective:         Ht 182.9 cm (72\")   Wt 81.6 kg (180 lb)   BMI 24.41 kg/m²     Physical Exam  Unable to perform complete physical exam secondary to video visit.  He was alert and oriented and in no acute distress  In-Office Procedure(s):  Procedures    ASCVD RIsk Score::  The ASCVD Risk score (Libby NA Boggs., et al., 2013) failed to calculate for the following reasons:    The systolic blood pressure is missing    Cannot find a previous HDL lab    Cannot find a previous total cholesterol lab    Recent Radiology:  Imaging Results (Most Recent)     None          Lab Review: "   Lab from 12/31/2020 revealed sodium of 140, potassium 4.1, glucose 89, BUN and creatinine 20 and 1.18    Lipid profile demonstrated a total cholesterol of 146, HDL 43 and LDL 82.  Triglycerides 103     Magnesium 2.0        Assessment:          Diagnosis Plan   1. Essential hypertension     2. Dyslipidemia     3. Paroxysmal atrial fibrillation (CMS/[facility])     4. Long term (current) use of anticoagulants [Z79.01]            Plan:   1.  Essential hypertension  He does record his blood pressures and overall, the readings are normal.  He occasionally has a systolic reading of 140 mmHg.  He is on Avapro.  He does not need a refill.    2.  Dyslipidemia  He is on pravastatin 40 mg daily.  Lab work in December 2020 demonstrated a total cholesterol of 146, HDL 43 and LDL 82.  Triglycerides 103    3.  Paroxysmal atrial fibrillation  Currently on sotalol and warfarin.  INR is being monitored and goal is between 2-3.    Length of video visit was 20 minutes  Review of lab and EMR documentation 15 minutes    Level of Care:                 Danielle Scott MD  01/04/21  .

## 2021-01-27 DIAGNOSIS — I48.0 PAF (PAROXYSMAL ATRIAL FIBRILLATION) (HCC): ICD-10-CM

## 2021-01-28 RX ORDER — WARFARIN SODIUM 5 MG/1
TABLET ORAL
Qty: 60 TABLET | Refills: 2 | Status: SHIPPED | OUTPATIENT
Start: 2021-01-28 | End: 2021-04-26

## 2021-02-12 DIAGNOSIS — E78.5 DYSLIPIDEMIA: Primary | Chronic | ICD-10-CM

## 2021-02-15 RX ORDER — PRAVASTATIN SODIUM 40 MG
TABLET ORAL
Qty: 30 TABLET | Refills: 5 | Status: SHIPPED | OUTPATIENT
Start: 2021-02-15 | End: 2021-07-27 | Stop reason: SDUPTHER

## 2021-03-04 ENCOUNTER — TELEPHONE (OUTPATIENT)
Dept: CARDIOLOGY | Facility: CLINIC | Age: 71
End: 2021-03-04

## 2021-03-04 DIAGNOSIS — I48.0 PAROXYSMAL ATRIAL FIBRILLATION (HCC): Primary | Chronic | ICD-10-CM

## 2021-03-04 NOTE — TELEPHONE ENCOUNTER
I just put in the order and it printed off at [phone] printer, can you fax it for me please. Thanks.

## 2021-03-29 ENCOUNTER — HOSPITAL ENCOUNTER (OUTPATIENT)
Dept: LAB | Facility: HOSPITAL | Age: 71
Discharge: HOME | End: 2021-03-29

## 2021-03-29 ENCOUNTER — ANTICOAGULATION VISIT (OUTPATIENT)
Dept: CARDIOLOGY | Facility: CLINIC | Age: 71
End: 2021-03-29

## 2021-03-29 DIAGNOSIS — Z79.01 LONG TERM (CURRENT) USE OF ANTICOAGULANTS: ICD-10-CM

## 2021-03-29 DIAGNOSIS — I48.0 PAROXYSMAL ATRIAL FIBRILLATION (HCC): ICD-10-CM

## 2021-03-29 LAB
INR PPP: 1.55 (ref 2–3)
INR PPP: 1.55 (ref 2–3)
PROTHROMBIN TIME: 16.1 S (ref 9.4–12)

## 2021-04-08 ENCOUNTER — HOSPITAL ENCOUNTER (OUTPATIENT)
Dept: INTERNAL MEDICINE | Facility: CLINIC | Age: 71
Discharge: HOME | End: 2021-04-08
Attending: STUDENT IN AN ORGANIZED HEALTH CARE EDUCATION/TRAINING PROGRAM

## 2021-04-08 ENCOUNTER — OFFICE VISIT CONVERTED (OUTPATIENT)
Dept: INTERNAL MEDICINE | Facility: CLINIC | Age: 71
End: 2021-04-08
Attending: STUDENT IN AN ORGANIZED HEALTH CARE EDUCATION/TRAINING PROGRAM

## 2021-04-08 LAB
INR PPP: 2.87 (ref 2–3)
PROTHROMBIN TIME: 28.8 S (ref 9.4–12)

## 2021-04-09 LAB
ALBUMIN SERPL-MCNC: 4.3 G/DL (ref 3.5–5)
ALBUMIN/GLOB SERPL: 1.7 {RATIO} (ref 1.4–2.6)
ALP SERPL-CCNC: 35 U/L (ref 56–155)
ALT SERPL-CCNC: 21 U/L (ref 10–40)
ANION GAP SERPL CALC-SCNC: 22 MMOL/L (ref 8–19)
AST SERPL-CCNC: 29 U/L (ref 15–50)
BASOPHILS # BLD AUTO: 0.01 10*3/UL (ref 0–0.2)
BASOPHILS NFR BLD AUTO: 0.2 % (ref 0–3)
BILIRUB SERPL-MCNC: 0.62 MG/DL (ref 0.2–1.3)
BUN SERPL-MCNC: 21 MG/DL (ref 5–25)
BUN/CREAT SERPL: 18 {RATIO} (ref 6–20)
CALCIUM SERPL-MCNC: 9 MG/DL (ref 8.7–10.4)
CHLORIDE SERPL-SCNC: 101 MMOL/L (ref 99–111)
CHOLEST SERPL-MCNC: 162 MG/DL (ref 107–200)
CHOLEST/HDLC SERPL: 3.1 {RATIO} (ref 3–6)
CONV ABS IMM GRAN: 0.02 10*3/UL (ref 0–0.2)
CONV CO2: 20 MMOL/L (ref 22–32)
CONV IMMATURE GRAN: 0.3 % (ref 0–1.8)
CONV TOTAL PROTEIN: 6.9 G/DL (ref 6.3–8.2)
CREAT UR-MCNC: 1.2 MG/DL (ref 0.7–1.2)
DEPRECATED RDW RBC AUTO: 47.8 FL (ref 35.1–43.9)
EOSINOPHIL # BLD AUTO: 0.12 10*3/UL (ref 0–0.7)
EOSINOPHIL # BLD AUTO: 2 % (ref 0–7)
ERYTHROCYTE [DISTWIDTH] IN BLOOD BY AUTOMATED COUNT: 13 % (ref 11.6–14.4)
GFR SERPLBLD BASED ON 1.73 SQ M-ARVRAT: >60 ML/MIN/{1.73_M2}
GLOBULIN UR ELPH-MCNC: 2.6 G/DL (ref 2–3.5)
GLUCOSE SERPL-MCNC: 81 MG/DL (ref 70–99)
HCT VFR BLD AUTO: 39.7 % (ref 42–52)
HDLC SERPL-MCNC: 52 MG/DL (ref 40–60)
HGB BLD-MCNC: 13.1 G/DL (ref 14–18)
LDLC SERPL CALC-MCNC: 87 MG/DL (ref 70–100)
LYMPHOCYTES # BLD AUTO: 1.94 10*3/UL (ref 1–5)
LYMPHOCYTES NFR BLD AUTO: 31.9 % (ref 20–45)
MCH RBC QN AUTO: 32.7 PG (ref 27–31)
MCHC RBC AUTO-ENTMCNC: 33 G/DL (ref 33–37)
MCV RBC AUTO: 99 FL (ref 80–96)
MONOCYTES # BLD AUTO: 0.55 10*3/UL (ref 0.2–1.2)
MONOCYTES NFR BLD AUTO: 9 % (ref 3–10)
NEUTROPHILS # BLD AUTO: 3.45 10*3/UL (ref 2–8)
NEUTROPHILS NFR BLD AUTO: 56.6 % (ref 30–85)
NRBC CBCN: 0 % (ref 0–0.7)
OSMOLALITY SERPL CALC.SUM OF ELEC: 288 MOSM/KG (ref 273–304)
PLATELET # BLD AUTO: 221 10*3/UL (ref 130–400)
PMV BLD AUTO: 9.7 FL (ref 9.4–12.4)
POTASSIUM SERPL-SCNC: 4.7 MMOL/L (ref 3.5–5.3)
PSA SERPL-MCNC: 1.26 NG/ML (ref 0–4)
RBC # BLD AUTO: 4.01 10*6/UL (ref 4.7–6.1)
SODIUM SERPL-SCNC: 138 MMOL/L (ref 135–147)
T4 FREE SERPL-MCNC: 1.3 NG/DL (ref 0.9–1.8)
TRIGL SERPL-MCNC: 116 MG/DL (ref 40–150)
TSH SERPL-ACNC: 2.53 M[IU]/L (ref 0.27–4.2)
VLDLC SERPL-MCNC: 23 MG/DL (ref 5–37)
WBC # BLD AUTO: 6.09 10*3/UL (ref 4.8–10.8)

## 2021-04-26 DIAGNOSIS — I48.0 PAF (PAROXYSMAL ATRIAL FIBRILLATION) (HCC): ICD-10-CM

## 2021-04-26 RX ORDER — WARFARIN SODIUM 5 MG/1
TABLET ORAL
Qty: 180 TABLET | Refills: 0 | Status: SHIPPED | OUTPATIENT
Start: 2021-04-26 | End: 2021-07-20

## 2021-05-11 NOTE — H&P
History and Physical      Patient Name: Victor Manuel Mendoza   Patient ID: [id number]   Sex: Male   YOB: 1950    Primary Care Provider: Ciro Daigle MD   Referring Provider: Yann Rodriguez MD    Visit Date: April 8, 2021    Provider: Ciro Daigle MD   Location: [facility]   Location Address: [address] [place] [address]; [address]  [place], [place]  [address]   Location Phone: [phone]          Chief Complaint  · Est Care       History Of Present Illness  Victor Manuel Mendoza is a 71 year old /White male who presents for evaluation and treatment of:      here to establish care.  Previous PCP: Dr. Michael JARAMILLO fib/CAD/HTN:    History of atrial fibrillation.  Follows with Dr. Wang Scott of cardiology in [place] (follows q6months).  Has had VANESSA in 2017 with mild mitral valve regurg as well as bilateral atrial enlargement.  Currently on warfarin for chronic anticoagulation as more affordable than NOACs.  On sotalol.    No history of MI, stent or CABG.  No history of CVA.  Although he smoked marijuana when a young man, he is a never smoker in terms of tobacco.  He consumes a small amount of alcohol most days of the week (~1 shot of liquor).  He denies illicits.      Hypothyroid:    on Synthroid    HODAN:    compliant with CPAP.  Follows with sleep medicine.    Depression:    started on citalopram about a year ago.  Started on this medicine as his wife had concerns about his mood.  Mr. Mendoza today denies any issues with his mood.  He also denies SI.  Although on Xanax at times when younger, he has no previous history with SSRIs.    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  He has and does wear his hearing aids.    HTN:    checks BP at home.  Mostly runs systolic 130s with occasional readings 140.    Geriatrics:  lives at home with wife  fully independent in ADLs, although   strength mildly reduced due to his arthritis  no falls in the past year    Health Maintenance:  Immunizations: COVID immunization x 2 (2/19/2021, 3/12/2021), flu shot in 2020, shingles shot x 2 (2020), pneumonia shot (2020), unsure when had last tetanus shot    Colonoscopy: has had in the past, never had an abnormal one, uncertain how long ago was last scope  eye exam: few weeks ago   dental exam: 1 month ago    No previous history of MI, stent, CABG, CVA or cancer.    Med Rec:    sulfasalazine 500 mg daily  finasteride 5 mg   citalopram 10 mg  sotalol 120 mg (1/2 tab, BID)  Irbesartan 300 mg   warfarin 5 mg (7.5 on Monday, 5 mg on the other days of the week)  Pravastatin 40 mg  levothyroxine 25 micrograms (1.5 tabs PO daily)  MVI           Past Medical History  Disease Name Date Onset Notes   Arthritis --  --    Bladder disorder --  --    Heart Disease --  --    High blood pressure --  --    High cholesterol --  --    Hypothyroidism --  --    Rectal bleeding --  --    Ulcer --  --          Past Surgical History  Procedure Name Date Notes   Colonoscopy --  --    endoscopy --  --    Total shoulder replacement --  --          Medication List  Name Date Started Instructions   Aspir-81 81 mg oral tablet,delayed release (DR/EC)  take 1 tablet (81 mg) by oral route once daily   finasteride 5 mg oral tablet 11/06/2018 take 1 tablet (5 mg) by oral route once daily for 7 days   irbesartan 300 mg oral tablet 06/21/2017 TAKE 1 TABLET BY MOUTH EVERY DAY   levothyroxine 25 mcg oral tablet  take 1 tablet (25 mcg) by oral route once daily   pravastatin 40 mg oral tablet 08/31/2017 TAKE 1 TABLET BY MOUTH EVERY NIGHT AT BEDTIME   sotalol 120 mg oral tablet 10/12/2018 TAKE 1/2 TABLET BY MOUTH TWICE DAILY   sulfasalazine 500 mg oral tablet  take 1 tablet by oral route 2 times a day   Synthroid 25 mcg oral tablet  take 1.5 tablets by oral route daily   warfarin 5 mg oral tablet  take 1 tablet (5 mg) by oral route once daily. 1.5  "tablets (7.5 MG) only on Monday         Allergy List  Allergen Name Date Reaction Notes   NO KNOWN DRUG ALLERGIES --  --  --          Family Medical History  Disease Name Relative/Age Notes   Family history of colon cancer Grandfather (maternal)/70s   Grandfather (maternal)/70s   Family history of breast cancer Grandmother (maternal)/50s   Grandmother (maternal)/50s         Social History  Finding Status Start/Stop Quantity Notes   Alcohol Current some day --/-- --  drinks weekly; beer and liquor  drinks daily; liquor   Caffeine Current - status unknown --/-- --  drinks 1-2 times per day   Recreational Drug Use Former --/-- --  in the past   Second hand smoke exposure Never 0/0 --  no   Tobacco Former 20/30 --  former smoker; started smoking at age 19; quit smoking at age 29  former smoker         Review of Systems  · Constitutional  o Denies  o : fever, fatigue, weight loss, weight gain  · Cardiovascular  o Denies  o : lower extremity edema, claudication, chest pressure, palpitations  · Respiratory  o Denies  o : shortness of breath, wheezing, cough, hemoptysis, dyspnea on exertion  · Gastrointestinal  o Denies  o : nausea, vomiting, diarrhea, constipation, abdominal pain      Vitals  Date Time BP Position Site L\R Cuff Size HR RR TEMP (F) WT  HT  BMI kg/m2 BSA m2 O2 Sat FR L/min FiO2 HC       07/28/2016 11:20 AM       16  174lbs 16oz 6'   23.73 2.01       11/16/2016 03:09 /78 Sitting    56 - R   174lbs 16oz 6'  1\" 23.09 2.02       09/14/2017 09:18 AM       12  180lbs 6oz 6'   24.46 2.04       04/08/2021 01:41 /78 Sitting    65 - R   184lbs 0oz 5'  10\" 26.4 2.03 97 %  21%          Physical Examination  · Constitutional  o Appearance  o : no acute distress, well-nourished  · Head and Face  o Head  o :   § Inspection  § : atraumatic, normocephalic  · Eyes  o Eyes  o : extraocular movements intact, no scleral icterus, no conjunctival injection  · Ears, Nose, Mouth and Throat  o Ears  o :   § External " Ears  § : normal  § Otoscopic Examination  § : tympanic membrane appearance within normal limits bilaterally  o Nose  o :   § Intranasal Exam  § : nares patent  o Oral Cavity  o :   § Oral Mucosa  § : moist mucous membranes  · Respiratory  o Respiratory Effort  o : breathing comfortably, symmetric chest rise  o Auscultation of Lungs  o : clear to asculatation bilaterally, no wheezes, rales, or rhonchii  · Cardiovascular  o Heart  o :   § Auscultation of Heart  § : irregularly irregular rhythm, no murmurs, rubs, or gallops  o Peripheral Vascular System  o :   § Extremities  § : 1+ LE edema bilaterally  · Gastrointestinal  o Abdominal Examination  o :   § Abdomen  § : non-distended, non-tender  · Neurologic  o Mental Status Examination  o :   § Orientation  § : grossly oriented to person, place and time  o Gait and Station  o :   § Gait Screening  § : normal gait  · Psychiatric  o General  o : normal mood and affect          Assessment  · Annual physical exam     V70.0/Z00.00  -labs today  -will check thyroid fxn as on Synthroid  -will check PT/INR as on warfarin  -will obtain records from Dr. Rodriguez, and try to determine when last colonoscopy  -plan for seasonal flu shot plus tetanus booster at next appointment in 6 months  · BPH (benign prostatic hyperplasia)     600.00/N40.0  -will continue finasteride  · Depression     311/F32.9  · Hyperlipidemia     272.4/E78.5  · Hypothyroidism     244.9/E03.9  · Screening for depression     V79.0/Z13.89  · Arthritis     716.90/M19.90  -stable, and chronic in nature  -having some decreased  strength, discussed assistive devices (e.g., to help open jars)  -will continue sulfasalazine  · High blood pressure     401.9/I10  -BP around goal of 130/80  -will continue current regimen  · Atrial fibrillation     427.31/I48.91  -on chronic anticoagulation  -follows with cardiology  · Coronary artery disease     414.00/I25.10  -no history of stents, CABG or  MI  · Hypothyroid     244.9/E03.9  · Obstructive sleep apnea     327.23/G47.33  -compliant with cpap  · Hearing loss     389.9/H91.90  -bilateral hearing aids  · Chronic anticoagulation     V58.61/Z79.01  -on warfarin  -will check PT/INR    Problems Reconciled  Plan  · Orders  o ACO-18: Negative screen for clinical depression using a standardized tool () - V79.0/Z13.89 - 04/08/2021  o Free T4 (32149) - 244.9/E03.9 - 04/08/2021  o Male Physical Primary Care Panel (CMP, CBC, TSH, Lipid, PSA) [facility] (58554, 63314, 56785, 91615, 98905, ) - 427.31/I48.91, 414.00/I25.10, V70.0/Z00.00 - 04/08/2021  o ACO-14: Influenza immunization administered or previously received [facility] () - 244.9/E03.9, 327.23/G47.33, V70.0/Z00.00 - 04/08/2021  o ACO-13: Fall Risk Screening with no falls in past year or only one fall without injury in the past year (1101F) - 427.31/I48.91, 244.9/E03.9, 327.23/G47.33, V70.0/Z00.00 - 04/08/2021  o ACO-39: Current medications updated and reviewed (1159F, ) - 427.31/I48.91, 414.00/I25.10, 244.9/E03.9, 327.23/G47.33 - 04/08/2021  o PT/INR (71641) - 427.31/I48.91 - 04/08/2021  · Medications  o Medications have been Reconciled  o Transition of Care or Provider Policy  · Instructions  o Reviewed health maintenance flowsheet and updated information. Orders were placed and/or patient's response was documented.  o Advised that cheeses and other sources of dairy fats, animal fats, fast food, and the extras (candy, pastries, pies, doughnuts and cookies) all contain LDL raising nutrients. Advised to increase fruits, vegetables, whole grains, and to monitor portion sizes.   o Depression Screen completed and scanned into the EMR under the designated folder within the patient's documents.  o Today's PHQ-9 result is 1.  o Patient was educated/instructed on their diagnosis, treatment and medications prior to discharge from the clinic today.  · Disposition  o Return Visit Request in/on 6 months +/- 2 days  "(83298).  · Associate Tasks  o Task ID [id number] \"''Provider to Front Office:             Electronically Signed by: Ciro Daigle MD -Author on April 9, 2021 08:35:37 AM  "

## 2021-05-14 VITALS
DIASTOLIC BLOOD PRESSURE: 78 MMHG | HEIGHT: 70 IN | OXYGEN SATURATION: 97 % | SYSTOLIC BLOOD PRESSURE: 135 MMHG | HEART RATE: 65 BPM | WEIGHT: 184 LBS | BODY MASS INDEX: 26.34 KG/M2

## 2021-06-04 ENCOUNTER — HOSPITAL ENCOUNTER (OUTPATIENT)
Dept: LAB | Facility: HOSPITAL | Age: 71
Discharge: HOME | End: 2021-06-04

## 2021-06-04 ENCOUNTER — ANTICOAGULATION VISIT (OUTPATIENT)
Dept: CARDIOLOGY | Facility: CLINIC | Age: 71
End: 2021-06-04

## 2021-06-04 DIAGNOSIS — I48.0 PAROXYSMAL ATRIAL FIBRILLATION (HCC): Primary | ICD-10-CM

## 2021-06-04 DIAGNOSIS — Z79.01 LONG TERM (CURRENT) USE OF ANTICOAGULANTS: ICD-10-CM

## 2021-06-04 LAB
INR PPP: 2.59
INR PPP: 2.59 (ref 2–3)
PROTHROMBIN TIME: 26 S (ref 9.4–12)

## 2021-07-20 DIAGNOSIS — I48.0 PAF (PAROXYSMAL ATRIAL FIBRILLATION) (HCC): ICD-10-CM

## 2021-07-20 DIAGNOSIS — I48.0 PAROXYSMAL ATRIAL FIBRILLATION (HCC): ICD-10-CM

## 2021-07-20 RX ORDER — WARFARIN SODIUM 5 MG/1
TABLET ORAL
Qty: 15 TABLET | Refills: 0 | Status: SHIPPED | OUTPATIENT
Start: 2021-07-20 | End: 2021-07-27 | Stop reason: SDUPTHER

## 2021-07-26 RX ORDER — SOTALOL HYDROCHLORIDE 80 MG/1
TABLET ORAL
Qty: 60 TABLET | Refills: 5 | Status: SHIPPED | OUTPATIENT
Start: 2021-07-26 | End: 2021-07-27 | Stop reason: SDUPTHER

## 2021-07-26 NOTE — TELEPHONE ENCOUNTER
Please let him know that his sotalol and Celexa can lengthen his QT interval.  Have him check with his primary care doctor and see if there is a different medication that he could use other than Celexa

## 2021-07-27 ENCOUNTER — OFFICE VISIT (OUTPATIENT)
Dept: CARDIOLOGY | Facility: CLINIC | Age: 71
End: 2021-07-27

## 2021-07-27 VITALS
HEART RATE: 77 BPM | BODY MASS INDEX: 26.34 KG/M2 | WEIGHT: 184 LBS | SYSTOLIC BLOOD PRESSURE: 152 MMHG | HEIGHT: 70 IN | DIASTOLIC BLOOD PRESSURE: 84 MMHG

## 2021-07-27 DIAGNOSIS — I48.0 PAROXYSMAL ATRIAL FIBRILLATION (HCC): Primary | Chronic | ICD-10-CM

## 2021-07-27 DIAGNOSIS — R42 DIZZINESS: Chronic | ICD-10-CM

## 2021-07-27 DIAGNOSIS — Z79.899 LONG-TERM USE OF HIGH-RISK MEDICATION: ICD-10-CM

## 2021-07-27 DIAGNOSIS — E78.5 DYSLIPIDEMIA: Chronic | ICD-10-CM

## 2021-07-27 DIAGNOSIS — Z79.01 LONG TERM (CURRENT) USE OF ANTICOAGULANTS: ICD-10-CM

## 2021-07-27 DIAGNOSIS — I10 ESSENTIAL HYPERTENSION: Chronic | ICD-10-CM

## 2021-07-27 DIAGNOSIS — I48.0 PAF (PAROXYSMAL ATRIAL FIBRILLATION) (HCC): ICD-10-CM

## 2021-07-27 LAB — INR PPP: 4.4 (ref 2–3)

## 2021-07-27 PROCEDURE — 93000 ELECTROCARDIOGRAM COMPLETE: CPT | Performed by: INTERNAL MEDICINE

## 2021-07-27 PROCEDURE — 36416 COLLJ CAPILLARY BLOOD SPEC: CPT | Performed by: INTERNAL MEDICINE

## 2021-07-27 PROCEDURE — 99214 OFFICE O/P EST MOD 30 MIN: CPT | Performed by: INTERNAL MEDICINE

## 2021-07-27 PROCEDURE — 85610 PROTHROMBIN TIME: CPT | Performed by: INTERNAL MEDICINE

## 2021-07-27 RX ORDER — WARFARIN SODIUM 5 MG/1
TABLET ORAL
Qty: 15 TABLET | Refills: 0 | Status: SHIPPED | OUTPATIENT
Start: 2021-07-27 | End: 2021-10-12 | Stop reason: HOSPADM

## 2021-07-27 RX ORDER — TRIAMTERENE AND HYDROCHLOROTHIAZIDE 75; 50 MG/1; MG/1
1 TABLET ORAL DAILY
Qty: 90 TABLET | Refills: 3 | Status: ON HOLD | OUTPATIENT
Start: 2021-07-27 | End: 2021-09-20

## 2021-07-27 RX ORDER — SOTALOL HYDROCHLORIDE 80 MG/1
40 TABLET ORAL 2 TIMES DAILY
Qty: 90 TABLET | Refills: 3 | Status: SHIPPED | OUTPATIENT
Start: 2021-07-27 | End: 2021-10-07

## 2021-07-27 RX ORDER — PRAVASTATIN SODIUM 40 MG
40 TABLET ORAL DAILY
Qty: 90 TABLET | Refills: 1 | Status: SHIPPED | OUTPATIENT
Start: 2021-07-27 | End: 2022-02-23 | Stop reason: SDUPTHER

## 2021-07-27 RX ORDER — IRBESARTAN 300 MG/1
300 TABLET ORAL DAILY
Qty: 90 TABLET | Refills: 3 | Status: ON HOLD | OUTPATIENT
Start: 2021-07-27 | End: 2021-10-12 | Stop reason: SDUPTHER

## 2021-07-27 RX ORDER — LANOLIN ALCOHOL/MO/W.PET/CERES
1000 CREAM (GRAM) TOPICAL DAILY
COMMUNITY

## 2021-07-27 NOTE — PROGRESS NOTES
"Chief Complaint  Atrial Fibrillation    Subjective    History of Present Illness    Mr. Mendoza is a 71-year-old gentleman. He is a very pleasant gentleman with a history paroxysmal atrial fibrillation, hypertension and hyperlipidemia.  He is on sotalol.  Mr. Mendoza states that overall he is feeling well.  He has not noted any palpitations.  He has been experiencing symptoms of dizziness.  He states that this generally occurs when he is doing something or when he bends over.  The room does not have a spinning sensation.  He has had no syncope.  No complaints of chest discomfort or shortness of air.  He has been tolerating his medication fairly well.  I did point out that he is on Celexa and sotalol, both of which can lengthen the QT interval.  Indeed his QT in the office today is slightly prolonged.  I have asked him to decrease his Celexa to 5 mg daily and speak with his primary care provider about changing agents for depression.  He does state that his blood pressure has been elevated at home.  I am going to add HCTZ 12.5 mg to his medical regimen.      Objective   Vital Signs:   /84   Pulse 77   Ht 177.8 cm (70\")   Wt 83.5 kg (184 lb)   BMI 26.40 kg/m²     Vitals and nursing note reviewed.   Constitutional:       General: Not in acute distress.     Appearance: Healthy appearance. Well-developed and not in distress. Not diaphoretic.   Eyes:      General: No scleral icterus.     Conjunctiva/sclera: Conjunctivae normal.      Pupils: Pupils are equal, round, and reactive to light.   HENT:      Head: Normocephalic and atraumatic.   Neck:      Thyroid: No thyromegaly.      Lymphadenopathy: No cervical adenopathy.   Pulmonary:      Effort: Pulmonary effort is normal. No respiratory distress.      Breath sounds: Normal breath sounds. No wheezing. No rales.   Cardiovascular:      PMI at left midclavicular line. Normal rate. Occasional ectopic beats. Regular rhythm. Normal S1. Normal S2.      Murmurs: There is no " murmur.      No gallop. No S3 and S4 gallop. No click. No rub.   Pulses:     Intact distal pulses. No decreased pulses.   Edema:     Peripheral edema absent.   Abdominal:      General: Bowel sounds are normal. There is no distension.      Palpations: Abdomen is soft. There is no abdominal mass.      Tenderness: There is no abdominal tenderness. There is no guarding or rebound.   Musculoskeletal: Normal range of motion.      Cervical back: Normal range of motion and neck supple. Skin:     General: Skin is warm and dry.      Coloration: Skin is not pale.      Findings: No rash.   Neurological:      Mental Status: Alert, oriented to person, place, and time and oriented to person, place and time.      Coordination: Coordination normal.      Gait: Gait is intact.   Psychiatric:         Behavior: Behavior normal.         Result Review :   The following data was reviewed by: Danielle Scott MD on 07/27/2021:  CMP    CMP 12/18/20 12/31/20 4/8/21   Glucose 78 89 81   BUN 21 20 21   Creatinine 1.26 (A) 1.18 1.20   Sodium 139 140 138   Potassium 4.6 4.1 4.7   Chloride 105 105 101   Calcium 8.8 8.8 9.0   Albumin  4.0 4.3   Total Bilirubin  0.53 0.62   Alkaline Phosphatase  37 (A) 35 (A)   AST (SGOT)  28 29   ALT (SGPT)  22 21   (A) Abnormal value            CBC    CBC 11/23/20 4/8/21   WBC 7.67 6.09   RBC 4.05 (A) 4.01 (A)   Hemoglobin 13.0 (A) 13.1 (A)   Hematocrit 41.0 (A) 39.7 (A)   .2 (A) 99.0 (A)   MCH 32.1 (A) 32.7 (A)   MCHC 31.7 (A) 33.0   RDW 12.6 13.0   Platelets 240 221   (A) Abnormal value            CBC w/diff    CBC w/Diff 11/23/20 4/8/21   WBC 7.67 6.09   RBC 4.05 (A) 4.01 (A)   Hemoglobin 13.0 (A) 13.1 (A)   Hematocrit 41.0 (A) 39.7 (A)   .2 (A) 99.0 (A)   MCH 32.1 (A) 32.7 (A)   MCHC 31.7 (A) 33.0   RDW 12.6 13.0   Platelets 240 221   Neutrophil Rel % 62.4 56.6   Lymphocyte Rel % 25.7 31.9   Monocyte Rel % 9.5 9.0   Eosinophil Rel % 1.7 2.0   Basophil Rel % 0.3 0.2   (A) Abnormal value             Lipid Panel    Lipid Panel 11/23/20 12/31/20 4/8/21   Total Cholesterol 142 146 162   Triglycerides 117 103 116   HDL Cholesterol 43 43 52   VLDL Cholesterol 23 21 23   LDL Cholesterol  76 82 87      Comments are available for some flowsheets but are not being displayed.           TSH    TSH 11/23/20 4/8/21   TSH 3.610 2.530                ECG 12 Lead    Date/Time: 7/27/2021 1:42 PM  Performed by: Danielle Scott MD  Authorized by: Danielle Scott MD   Comparison: compared with previous ECG from 11/13/2019  Comparison to previous ECG: Sinus bradycardia.  Normal QT  Comments: Sinus rhythm with PACs.  Corrected QT interval is 527 ms              Assessment and Plan    1. Paroxysmal atrial fibrillation (CMS/HCC)  Currently in sinus rhythm.  He is on sotalol 40 mg twice daily    2. Essential hypertension  Blood pressure is elevated.  I have added HCTZ 12.5 mg to his medical regimen    3. Dyslipidemia  He is on pravastatin.  Most recent lipid profile from April 2021 revealed an LDL of 87    4. Long term (current) use of anticoagulants [Z79.01]  He is on warfarin with an INR goal of 2-3.  INR today is 4.4.  He will not take any referring tonight.  He will then take 5 mg daily and recheck in 10 days.  I have relayed this back to him and have him repeat with a set    5.  Dizziness  Etiology is not clear.  His blood pressure is elevated and I am adding hydrochlorothiazide 12.5 mg daily.  I will refer him to ENT.  I will place a monitor today.  I will also order an echocardiogram        Follow Up   No follow-ups on file.  Patient was given instructions and counseling regarding his condition or for health maintenance advice. Please see specific information pulled into the AVS if appropriate.

## 2021-08-09 ENCOUNTER — TELEPHONE (OUTPATIENT)
Dept: CARDIOLOGY | Facility: CLINIC | Age: 71
End: 2021-08-09

## 2021-08-09 NOTE — TELEPHONE ENCOUNTER
Patient had to stop taking his prescription of Triamterene-hydrochlorothiazide 75 MG due to serious cramping. Patient stopped taking it on Thursday 08/05.     Please adviseTimur

## 2021-08-11 RX ORDER — FINASTERIDE 5 MG/1
TABLET, FILM COATED ORAL
Qty: 90 TABLET | Refills: 3 | Status: SHIPPED | OUTPATIENT
Start: 2021-08-11 | End: 2022-06-03

## 2021-08-13 ENCOUNTER — OFFICE VISIT (OUTPATIENT)
Dept: SLEEP MEDICINE | Facility: HOSPITAL | Age: 71
End: 2021-08-13

## 2021-08-13 VITALS
HEIGHT: 72 IN | OXYGEN SATURATION: 100 % | BODY MASS INDEX: 24.79 KG/M2 | WEIGHT: 183 LBS | HEART RATE: 65 BPM | SYSTOLIC BLOOD PRESSURE: 129 MMHG | DIASTOLIC BLOOD PRESSURE: 78 MMHG

## 2021-08-13 DIAGNOSIS — G47.33 OSA (OBSTRUCTIVE SLEEP APNEA): Primary | ICD-10-CM

## 2021-08-13 PROCEDURE — G0463 HOSPITAL OUTPT CLINIC VISIT: HCPCS

## 2021-08-13 NOTE — PROGRESS NOTES
[facility]                          Chief Complaint:   Follow up for obstructive sleep apnea and comorbid HTN, CAD    History of present illness:   Subjective     HODAN:    PSG on 01/20/17 showed AHI 44/h.   CPAP titration showed adequate pressure of 9 cmH2O with residual AHI of 7.7.    The patient is currently on CPAP of 9.  He uses his CPAP regularly.  He    denies any issues with air leak or pressure intolerance.        Mask: FFM which does fit well.  No significant air leak or dry mouth  DME: [facility]    ESS: Total score: 3   HTN    REVIEW OF SYSTEMS:   HEENT: No nasal congestion or postnasal drip   CARDIOVASCULAR: No chest pain, chest pressure or chest discomfort. No palpitations or edema.   RESPIRATORY: No shortness of breath, cough or sputum.   GASTROINTESTINAL: No abdominal bloating or reflux   NEUROLOGICAL/PSYCHOATRY: No depression nor anxiety    PAST MEDICAL HISTORY:      1. Hypertension.      2. Coronary artery disease.      3. Hypothyroidism.      4. Arthritis.      5. Dyslipidemia.         PAST SURGICAL HISTORY:      1. Shoulder surgery in 2010.      2. Coronary angiography.         SOCIAL HISTORY:    Has not changed.  He does not smoke.  He drinks about two alcoholic beverages    a day.  He drinks two cups of coffee as well.  He denies illicit drug use.             Medication Review:     Current Outpatient Medications:   •  citalopram (CeleXA) 10 MG tablet, Take 1 tablet by mouth Daily., Disp: , Rfl: 5  •  finasteride (PROSCAR) 5 MG tablet, TAKE 1 TABLET BY MOUTH EVERY DAY, Disp: 90 tablet, Rfl: 3  •  Flaxseed, Linseed, (FLAX SEED OIL) 1300 MG capsule, Take  by mouth., Disp: , Rfl:   •  irbesartan (AVAPRO) 300 MG tablet, Take 1 tablet by mouth Daily., Disp: 90 tablet, Rfl: 3  •  levothyroxine (SYNTHROID, LEVOTHROID) 25 MCG tablet, Take 37.5 mcg by mouth Daily., Disp: , Rfl:   •  Multiple Vitamin (MULTI-VITAMIN DAILY PO), Take  by mouth., Disp: , Rfl:   •  pravastatin  "(PRAVACHOL) 40 MG tablet, Take 1 tablet by mouth Daily., Disp: 90 tablet, Rfl: 1  •  sotalol (BETAPACE AF) 80 MG tablet tablet, Take 0.5 tablets by mouth 2 (Two) Times a Day., Disp: 90 tablet, Rfl: 3  •  sulfaSALAzine (AZULFIDINE) 500 MG tablet, Take 500 mg by mouth Daily., Disp: , Rfl:   •  triamterene-hydrochlorothiazide (MAXZIDE) 75-50 MG per tablet, Take 1 tablet by mouth Daily., Disp: 90 tablet, Rfl: 3  •  vitamin B-12 (CYANOCOBALAMIN) 1000 MCG tablet, Take 1,000 mcg by mouth Daily., Disp: , Rfl:   •  warfarin (COUMADIN) 5 MG tablet, TAKE 1 TABLET BY MOUTH EVERY NIGHT EXCEPT 1.5 TABLETS ON Monday., Disp: 15 tablet, Rfl: 0      Objective   Vital Signs:  Vitals:    08/13/21 0900   BP: 129/78   Pulse: 65   SpO2: 100%   Weight: 83 kg (183 lb)   Height: 182.9 cm (72\")     Body mass index is 24.82 kg/m².          Physical Exam:   General Appearance:    Alert, cooperative, in no acute distress   ENMT:  Freidman score 3, long uvula   Neck:  Trachea midline. No thyromegaly.   Lungs:    Equal but diminished air entry bilaterally.  No crackles or wheezing.  Nonlabored breathing.    Heart:    Regular rhythm and normal rate, normal S1 and S2, no            Murmur.   Abdomen:     Soft.  No tenderness.  No HSM    Neuro:   Conscious, alert, oriented x3. Appropriate mood and affect.    Extremities:   Moves all extremities well, no edema, no cyanosis, no             Redness              Diagnostic data:    CPAP download showed:  Date: Last 30 days  Usage (days): 90%  Days used>4h: 76%  AHI: 5.3/h  Leak: 2 hours and 23 min   Usage: 5 h and 19 min  CPAP: 9 cm H2O    INR 7/27/2021: 4.  EKG 7/27/2021: A. fib    Assessment   1. HODAN, on CPAP  2. Essential hypertension  3. Atrial fibrillation      PLAN:  Discussed the result of the download.   Patient is compliant with therapy and clinically benefit from treatment.  Patient was encouraged to continue using his CPAP and increase usage.  Mask refitting might be required due to air leak " but it does not disturb him.  Refill supplies.    Discussed the importance of Pap therapy in the setting of comorbid HTN and A. fib.  Continue same meds                  This note was dictated utilizing Dragon dictation

## 2021-08-23 ENCOUNTER — TELEPHONE (OUTPATIENT)
Dept: CARDIOLOGY | Facility: CLINIC | Age: 71
End: 2021-08-23

## 2021-08-25 RX ORDER — LEVOTHYROXINE SODIUM 0.03 MG/1
TABLET ORAL
Qty: 135 TABLET | Refills: 3 | Status: SHIPPED | OUTPATIENT
Start: 2021-08-25 | End: 2022-07-21 | Stop reason: SDUPTHER

## 2021-08-30 ENCOUNTER — HOSPITAL ENCOUNTER (OUTPATIENT)
Dept: CARDIOLOGY | Facility: HOSPITAL | Age: 71
Discharge: HOME | End: 2021-08-30
Admitting: INTERNAL MEDICINE

## 2021-08-30 ENCOUNTER — OFFICE VISIT (OUTPATIENT)
Dept: CARDIOLOGY | Facility: CLINIC | Age: 71
End: 2021-08-30

## 2021-08-30 VITALS
BODY MASS INDEX: 24.65 KG/M2 | DIASTOLIC BLOOD PRESSURE: 96 MMHG | HEIGHT: 72 IN | SYSTOLIC BLOOD PRESSURE: 138 MMHG | HEART RATE: 56 BPM | WEIGHT: 182 LBS

## 2021-08-30 VITALS
WEIGHT: 182 LBS | HEIGHT: 72 IN | SYSTOLIC BLOOD PRESSURE: 108 MMHG | BODY MASS INDEX: 24.65 KG/M2 | DIASTOLIC BLOOD PRESSURE: 60 MMHG

## 2021-08-30 DIAGNOSIS — I48.0 PAROXYSMAL ATRIAL FIBRILLATION (HCC): Primary | Chronic | ICD-10-CM

## 2021-08-30 DIAGNOSIS — I48.0 PAROXYSMAL ATRIAL FIBRILLATION (HCC): ICD-10-CM

## 2021-08-30 DIAGNOSIS — I10 ESSENTIAL HYPERTENSION: Chronic | ICD-10-CM

## 2021-08-30 DIAGNOSIS — R00.1 BRADYCARDIA, SINUS: ICD-10-CM

## 2021-08-30 LAB
AORTIC DIMENSIONLESS INDEX: 0.6 (DI)
ASCENDING AORTA: 3.5 CM
BH CV ECHO MEAS - ACS: 2 CM
BH CV ECHO MEAS - AO MAX PG (FULL): 1.8 MMHG
BH CV ECHO MEAS - AO MAX PG: 3.7 MMHG
BH CV ECHO MEAS - AO MEAN PG (FULL): 1 MMHG
BH CV ECHO MEAS - AO MEAN PG: 2 MMHG
BH CV ECHO MEAS - AO ROOT AREA (BSA CORRECTED): 1.9
BH CV ECHO MEAS - AO ROOT AREA: 11.9 CM^2
BH CV ECHO MEAS - AO ROOT DIAM: 3.9 CM
BH CV ECHO MEAS - AO V2 MAX: 96.5 CM/SEC
BH CV ECHO MEAS - AO V2 MEAN: 66.9 CM/SEC
BH CV ECHO MEAS - AO V2 VTI: 20.5 CM
BH CV ECHO MEAS - ASC AORTA: 3.5 CM
BH CV ECHO MEAS - AVA(I,A): 2.7 CM^2
BH CV ECHO MEAS - AVA(I,D): 2.7 CM^2
BH CV ECHO MEAS - AVA(V,A): 3 CM^2
BH CV ECHO MEAS - AVA(V,D): 3 CM^2
BH CV ECHO MEAS - BSA(HAYCOCK): 2.1 M^2
BH CV ECHO MEAS - BSA: 2 M^2
BH CV ECHO MEAS - BZI_BMI: 24.7 KILOGRAMS/M^2
BH CV ECHO MEAS - BZI_METRIC_HEIGHT: 182.9 CM
BH CV ECHO MEAS - BZI_METRIC_WEIGHT: 82.6 KG
BH CV ECHO MEAS - EDV(CUBED): 97.3 ML
BH CV ECHO MEAS - EDV(MOD-SP2): 89 ML
BH CV ECHO MEAS - EDV(MOD-SP4): 72 ML
BH CV ECHO MEAS - EDV(TEICH): 97.3 ML
BH CV ECHO MEAS - EF(CUBED): 72.3 %
BH CV ECHO MEAS - EF(MOD-BP): 59.6 %
BH CV ECHO MEAS - EF(MOD-SP2): 61.8 %
BH CV ECHO MEAS - EF(MOD-SP4): 55.6 %
BH CV ECHO MEAS - EF(TEICH): 64 %
BH CV ECHO MEAS - ESV(CUBED): 27 ML
BH CV ECHO MEAS - ESV(MOD-SP2): 34 ML
BH CV ECHO MEAS - ESV(MOD-SP4): 32 ML
BH CV ECHO MEAS - ESV(TEICH): 35 ML
BH CV ECHO MEAS - FS: 34.8 %
BH CV ECHO MEAS - IVS/LVPW: 1.2
BH CV ECHO MEAS - IVSD: 1.1 CM
BH CV ECHO MEAS - LV DIASTOLIC VOL/BSA (35-75): 35.2 ML/M^2
BH CV ECHO MEAS - LV MASS(C)D: 158.8 GRAMS
BH CV ECHO MEAS - LV MASS(C)DI: 77.6 GRAMS/M^2
BH CV ECHO MEAS - LV MAX PG: 1.9 MMHG
BH CV ECHO MEAS - LV MEAN PG: 1 MMHG
BH CV ECHO MEAS - LV SYSTOLIC VOL/BSA (12-30): 15.6 ML/M^2
BH CV ECHO MEAS - LV V1 MAX: 69.8 CM/SEC
BH CV ECHO MEAS - LV V1 MEAN: 44.3 CM/SEC
BH CV ECHO MEAS - LV V1 VTI: 13.3 CM
BH CV ECHO MEAS - LVIDD: 4.6 CM
BH CV ECHO MEAS - LVIDS: 3 CM
BH CV ECHO MEAS - LVLD AP2: 8.2 CM
BH CV ECHO MEAS - LVLD AP4: 7.6 CM
BH CV ECHO MEAS - LVLS AP2: 7 CM
BH CV ECHO MEAS - LVLS AP4: 6.8 CM
BH CV ECHO MEAS - LVOT AREA (M): 4.2 CM^2
BH CV ECHO MEAS - LVOT AREA: 4.2 CM^2
BH CV ECHO MEAS - LVOT DIAM: 2.3 CM
BH CV ECHO MEAS - LVPWD: 0.9 CM
BH CV ECHO MEAS - MED PEAK E' VEL: 4.6 CM/SEC
BH CV ECHO MEAS - MV DEC SLOPE: 543 CM/SEC^2
BH CV ECHO MEAS - MV DEC TIME: 222 SEC
BH CV ECHO MEAS - MV E MAX VEL: 90 CM/SEC
BH CV ECHO MEAS - MV MAX PG: 4.5 MMHG
BH CV ECHO MEAS - MV MEAN PG: 1 MMHG
BH CV ECHO MEAS - MV P1/2T MAX VEL: 115 CM/SEC
BH CV ECHO MEAS - MV P1/2T: 62 MSEC
BH CV ECHO MEAS - MV V2 MAX: 106 CM/SEC
BH CV ECHO MEAS - MV V2 MEAN: 53.7 CM/SEC
BH CV ECHO MEAS - MV V2 VTI: 22.4 CM
BH CV ECHO MEAS - MVA P1/2T LCG: 1.9 CM^2
BH CV ECHO MEAS - MVA(P1/2T): 3.5 CM^2
BH CV ECHO MEAS - MVA(VTI): 2.5 CM^2
BH CV ECHO MEAS - PA ACC TIME: 0.12 SEC
BH CV ECHO MEAS - PA MAX PG (FULL): 1.5 MMHG
BH CV ECHO MEAS - PA MAX PG: 2.6 MMHG
BH CV ECHO MEAS - PA PR(ACCEL): 26.8 MMHG
BH CV ECHO MEAS - PA V2 MAX: 80.7 CM/SEC
BH CV ECHO MEAS - RAP SYSTOLE: 3 MMHG
BH CV ECHO MEAS - RV MAX PG: 1.1 MMHG
BH CV ECHO MEAS - RV MEAN PG: 1 MMHG
BH CV ECHO MEAS - RV V1 MAX: 52.2 CM/SEC
BH CV ECHO MEAS - RV V1 MEAN: 33 CM/SEC
BH CV ECHO MEAS - RV V1 VTI: 8.9 CM
BH CV ECHO MEAS - RVSP: 35.9 MMHG
BH CV ECHO MEAS - SI(AO): 119.7 ML/M^2
BH CV ECHO MEAS - SI(CUBED): 34.4 ML/M^2
BH CV ECHO MEAS - SI(LVOT): 27 ML/M^2
BH CV ECHO MEAS - SI(MOD-SP2): 26.9 ML/M^2
BH CV ECHO MEAS - SI(MOD-SP4): 19.5 ML/M^2
BH CV ECHO MEAS - SI(TEICH): 30.5 ML/M^2
BH CV ECHO MEAS - SUP REN AO DIAM: 1.9 CM
BH CV ECHO MEAS - SV(AO): 244.9 ML
BH CV ECHO MEAS - SV(CUBED): 70.3 ML
BH CV ECHO MEAS - SV(LVOT): 55.3 ML
BH CV ECHO MEAS - SV(MOD-SP2): 55 ML
BH CV ECHO MEAS - SV(MOD-SP4): 40 ML
BH CV ECHO MEAS - SV(TEICH): 62.3 ML
BH CV ECHO MEAS - TAPSE (>1.6): 1.2 CM
BH CV ECHO MEAS - TR MAX VEL: 287 CM/SEC
BH CV XLRA - RV BASE: 4 CM
BH CV XLRA - RV LENGTH: 6.3 CM
BH CV XLRA - RV MID: 2.1 CM
BH CV XLRA - TDI S': 8.9 CM/SEC
LEFT ATRIUM VOLUME INDEX: 61 ML/M2
MAXIMAL PREDICTED HEART RATE: 148 BPM
SINUS: 3.6 CM
STRESS TARGET HR: 126 BPM

## 2021-08-30 PROCEDURE — 93306 TTE W/DOPPLER COMPLETE: CPT

## 2021-08-30 PROCEDURE — 99214 OFFICE O/P EST MOD 30 MIN: CPT | Performed by: INTERNAL MEDICINE

## 2021-08-30 PROCEDURE — 93306 TTE W/DOPPLER COMPLETE: CPT | Performed by: INTERNAL MEDICINE

## 2021-08-31 ENCOUNTER — TELEPHONE (OUTPATIENT)
Dept: CARDIOLOGY | Facility: CLINIC | Age: 71
End: 2021-08-31

## 2021-08-31 DIAGNOSIS — R42 DIZZINESS: ICD-10-CM

## 2021-08-31 DIAGNOSIS — I48.0 PAROXYSMAL ATRIAL FIBRILLATION (HCC): ICD-10-CM

## 2021-08-31 DIAGNOSIS — I45.9 HEART BLOCK: Primary | ICD-10-CM

## 2021-09-02 ENCOUNTER — OFFICE VISIT (OUTPATIENT)
Dept: INTERNAL MEDICINE | Facility: CLINIC | Age: 71
End: 2021-09-02

## 2021-09-02 VITALS
DIASTOLIC BLOOD PRESSURE: 81 MMHG | BODY MASS INDEX: 24.54 KG/M2 | TEMPERATURE: 98.6 F | WEIGHT: 181.2 LBS | HEART RATE: 68 BPM | HEIGHT: 72 IN | OXYGEN SATURATION: 98 % | SYSTOLIC BLOOD PRESSURE: 131 MMHG

## 2021-09-02 DIAGNOSIS — I49.5 TACHY-BRADY SYNDROME (HCC): ICD-10-CM

## 2021-09-02 DIAGNOSIS — I10 ESSENTIAL HYPERTENSION: Chronic | ICD-10-CM

## 2021-09-02 DIAGNOSIS — Z79.01 LONG TERM (CURRENT) USE OF ANTICOAGULANTS: ICD-10-CM

## 2021-09-02 DIAGNOSIS — Z96.611 HISTORY OF RIGHT SHOULDER REPLACEMENT: ICD-10-CM

## 2021-09-02 DIAGNOSIS — R41.3 MEMORY CHANGES: ICD-10-CM

## 2021-09-02 DIAGNOSIS — I48.19 OTHER PERSISTENT ATRIAL FIBRILLATION (HCC): ICD-10-CM

## 2021-09-02 DIAGNOSIS — R35.1 NOCTURIA: ICD-10-CM

## 2021-09-02 DIAGNOSIS — D53.9 MACROCYTIC ANEMIA: ICD-10-CM

## 2021-09-02 DIAGNOSIS — N40.0 BENIGN PROSTATIC HYPERPLASIA WITHOUT LOWER URINARY TRACT SYMPTOMS: ICD-10-CM

## 2021-09-02 DIAGNOSIS — M54.50 LOW BACK PAIN, UNSPECIFIED BACK PAIN LATERALITY, UNSPECIFIED CHRONICITY, UNSPECIFIED WHETHER SCIATICA PRESENT: ICD-10-CM

## 2021-09-02 LAB — INR PPP: 2.6 (ref 0.9–1.1)

## 2021-09-02 PROCEDURE — 85610 PROTHROMBIN TIME: CPT | Performed by: STUDENT IN AN ORGANIZED HEALTH CARE EDUCATION/TRAINING PROGRAM

## 2021-09-02 PROCEDURE — 36416 COLLJ CAPILLARY BLOOD SPEC: CPT | Performed by: STUDENT IN AN ORGANIZED HEALTH CARE EDUCATION/TRAINING PROGRAM

## 2021-09-02 PROCEDURE — 82746 ASSAY OF FOLIC ACID SERUM: CPT | Performed by: STUDENT IN AN ORGANIZED HEALTH CARE EDUCATION/TRAINING PROGRAM

## 2021-09-02 PROCEDURE — 99214 OFFICE O/P EST MOD 30 MIN: CPT | Performed by: STUDENT IN AN ORGANIZED HEALTH CARE EDUCATION/TRAINING PROGRAM

## 2021-09-02 PROCEDURE — 82607 VITAMIN B-12: CPT | Performed by: STUDENT IN AN ORGANIZED HEALTH CARE EDUCATION/TRAINING PROGRAM

## 2021-09-02 NOTE — ASSESSMENT & PLAN NOTE
-through a quick review of the literature, heavy metal toxicity from a shoulder replacement is vanishingly rare  -will obtain x-ray of shoulder, and obtain MOCA testing next visit  -will check B12/folate

## 2021-09-02 NOTE — PROGRESS NOTES
"Chief Complaint  Follow-up, Labs Only, Shoulder Pain, and Back Pain    Subjective     {Problem List  Visit Diagnosis   Encounters  Notes  Medications  Labs  Result Review Imaging  Media :23}     Victor Manuel Mendoza presents to [facility]  History of Present Illness    Here with complaints of right shoulder pain, low back pain, nocturia and concerns about memory.    Had right shoulder placed over a decade ago.  Having some right shoulder pain.  He reports that his son is concerned that he might be having cobalt toxicity affecting his memory and wanting to discuss heavy metal testing.  Also desiring B12 testing for memory concerns.    Also with low back pain.  PT has helped in the past.    Was asked by son to inquire whether calcium scoring (presumably coronary artery calcium score) would be indicated.    He reports having to get up at night to urinate.  He reports finasteride seems less effective now and wonders if she discontinue.  He has been drinking fluids after supper.    Regarding his chronic anticoagulation, he is taking 1.5 tabs of his 5 mg warfarin on Mondays, and one 5 mg tab on the other days of the week (Tuesday-Sunday).      Objective   Vital Signs:   /81 (BP Location: Left arm, Patient Position: Sitting, Cuff Size: Adult)   Pulse 68   Temp 98.6 °F (37 °C) (Temporal)   Ht 182.9 cm (72\")   Wt 82.2 kg (181 lb 3.2 oz)   SpO2 98%   BMI 24.58 kg/m²     Physical Exam   Appearance: No acute distress, well-nourished  Head: normocephalic, atraumatic  Eyes: no scleral icterus, no conjunctival injection  Ears, Nose, and Throat: external ears normal, nares patent, moist mucous membranes  Cardiovascular: irregularly irregular, no murmurs, rales, or rhonchi. no edema  Respiratory: breathing comfortably, symmetric chest rise, clear to auscultation bilaterally. No wheezes, rales, or rhonchi.  GI: soft, NTTP, no masses or HSM  Neuro: alert and oriented  Psych: " normal mood and affect   Result Review :   The following data was reviewed by: Ciro Bland MD on 09/02/2021:  Common labs    Common Labsle 12/18/20 12/31/20 12/31/20 4/8/21     [id number]    Glucose 78 89  81   BUN 21 20  21   Creatinine 1.26 (A) 1.18  1.20   Sodium 139 140  138   Potassium 4.6 4.1  4.7   Chloride 105 105  101   Calcium 8.8 8.8  9.0   Albumin  4.0  4.3   Total Bilirubin  0.53  0.62   Alkaline Phosphatase  37 (A)  35 (A)   AST (SGOT)  28  29   ALT (SGPT)  22  21   WBC    6.09   Hemoglobin    13.1 (A)   Hematocrit    39.7 (A)   Platelets    221   Total Cholesterol   146 162   Triglycerides   103 116   HDL Cholesterol   43 52   LDL Cholesterol    82 87   PSA    1.26   (A) Abnormal value       Comments are available for some flowsheets but are not being displayed.              Procedures      Assessment and Plan    Diagnoses and all orders for this visit:    1. Essential hypertension    2. Long term (current) use of anticoagulants [Z79.01]  Assessment & Plan:  -POC INR 2.6 and at goal  -will continue warfarin at current dosage, and repeat in one month    Orders:  -     POC INR    3. Tachy-staci syndrome (CMS/HCC)    4. Other persistent atrial fibrillation (CMS/HCC)  -     POC INR    5. Benign prostatic hyperplasia without lower urinary tract symptoms  Assessment & Plan:  -recommended continue finasteride  -recommended stopping fluids two hours before bedtime, and to double void prior to bedtime      6. Macrocytic anemia  -     Vitamin B12  -     Folate  -     Cancel: Comprehensive Metabolic Panel  -     Ambulatory Referral to Physical Therapy Evaluate and treat    7. History of right shoulder replacement  -     XR Shoulder 2+ View Right; Future    8. Memory changes  Assessment & Plan:  -through a quick review of the literature, heavy metal toxicity from a shoulder replacement is vanishingly rare  -will obtain x-ray of shoulder, and obtain MOCA testing next visit  -will check B12/folate      9. Low  back pain, unspecified back pain laterality, unspecified chronicity, unspecified whether sciatica present    10. Nocturia      Follow Up   Return in about 3 months (around 12/2/2021) for Memory changes.  Patient was given instructions and counseling regarding his condition or for health maintenance advice. Please see specific information pulled into the AVS if appropriate.        English

## 2021-09-02 NOTE — ASSESSMENT & PLAN NOTE
-recommended continue finasteride  -recommended stopping fluids two hours before bedtime, and to double void prior to bedtime

## 2021-09-03 ENCOUNTER — ANTICOAGULATION VISIT (OUTPATIENT)
Dept: CARDIOLOGY | Facility: CLINIC | Age: 71
End: 2021-09-03

## 2021-09-03 DIAGNOSIS — I48.0 PAROXYSMAL ATRIAL FIBRILLATION (HCC): Primary | ICD-10-CM

## 2021-09-03 DIAGNOSIS — Z79.01 LONG TERM (CURRENT) USE OF ANTICOAGULANTS: ICD-10-CM

## 2021-09-03 LAB
FOLATE SERPL-MCNC: >20 NG/ML (ref 4.78–24.2)
INR PPP: 2.6 (ref 2–3)
VIT B12 BLD-MCNC: 1143 PG/ML (ref 211–946)

## 2021-09-07 ENCOUNTER — TELEPHONE (OUTPATIENT)
Dept: INTERNAL MEDICINE | Facility: CLINIC | Age: 71
End: 2021-09-07

## 2021-09-07 DIAGNOSIS — I48.0 PAROXYSMAL ATRIAL FIBRILLATION (HCC): ICD-10-CM

## 2021-09-07 DIAGNOSIS — Z01.818 PREOP TESTING: Primary | ICD-10-CM

## 2021-09-07 PROBLEM — I45.9 HEART BLOCK: Status: ACTIVE | Noted: 2021-09-07

## 2021-09-07 NOTE — TELEPHONE ENCOUNTER
Caller: Victor Manuel Mendoza    Relationship to patient: Self    Best call back number: [phone]     Patient is needing: PT CALLED TO GET HIS TEST RESULTS, PLEASE CALL BACK AND ADVISE, THANK YOU.    UNABLE TO WARM TRANSFER

## 2021-09-07 NOTE — TELEPHONE ENCOUNTER
ATTEMPTED TO CONTACT PT PER PROVIDER'S INSTRUCTIONS     NO ANSWER     LEFT VOICEMAIL WITH INSTRUCTIONS TO RETURN CALL TO OFFICE AT [phone]    OK FOR HUB TO ADVISE/READ   Ciro lBand MD   9/5/2021  9:57 AM EDT Back to Top      B12 and folate levels are unremarkable (and doing well).

## 2021-09-07 NOTE — TELEPHONE ENCOUNTER
----- Message from Ciro Bland MD sent at 9/5/2021  9:57 AM EDT -----  B12 and folate levels are unremarkable (and doing well).

## 2021-09-14 ENCOUNTER — TRANSCRIBE ORDERS (OUTPATIENT)
Dept: SLEEP MEDICINE | Facility: HOSPITAL | Age: 71
End: 2021-09-14

## 2021-09-14 DIAGNOSIS — Z01.818 OTHER SPECIFIED PRE-OPERATIVE EXAMINATION: Primary | ICD-10-CM

## 2021-09-16 ENCOUNTER — HOSPITAL ENCOUNTER (OUTPATIENT)
Dept: GENERAL RADIOLOGY | Facility: HOSPITAL | Age: 71
Discharge: HOME | End: 2021-09-16
Admitting: STUDENT IN AN ORGANIZED HEALTH CARE EDUCATION/TRAINING PROGRAM

## 2021-09-16 DIAGNOSIS — Z96.611 HISTORY OF RIGHT SHOULDER REPLACEMENT: ICD-10-CM

## 2021-09-16 PROCEDURE — 73030 X-RAY EXAM OF SHOULDER: CPT

## 2021-09-17 ENCOUNTER — LAB (OUTPATIENT)
Dept: LAB | Facility: HOSPITAL | Age: 71
End: 2021-09-17

## 2021-09-17 DIAGNOSIS — T14.8XXA SUBLUXATION: ICD-10-CM

## 2021-09-17 DIAGNOSIS — Z01.818 OTHER SPECIFIED PRE-OPERATIVE EXAMINATION: ICD-10-CM

## 2021-09-17 DIAGNOSIS — Z96.611 HISTORY OF RIGHT SHOULDER REPLACEMENT: Primary | ICD-10-CM

## 2021-09-17 DIAGNOSIS — I48.91 ATRIAL FIBRILLATION, UNSPECIFIED TYPE (HCC): Primary | ICD-10-CM

## 2021-09-17 LAB
INR PPP: 3.89 (ref 2–3)
PROTHROMBIN TIME: 39.1 SECONDS (ref 9.4–12)
SARS-COV-2 ORF1AB RESP QL NAA+PROBE: NOT DETECTED

## 2021-09-17 PROCEDURE — 85610 PROTHROMBIN TIME: CPT

## 2021-09-17 PROCEDURE — U0005 INFEC AGEN DETEC AMPLI PROBE: HCPCS

## 2021-09-17 PROCEDURE — C9803 HOPD COVID-19 SPEC COLLECT: HCPCS

## 2021-09-17 PROCEDURE — U0004 COV-19 TEST NON-CDC HGH THRU: HCPCS

## 2021-09-17 PROCEDURE — 36415 COLL VENOUS BLD VENIPUNCTURE: CPT

## 2021-09-20 ENCOUNTER — ANTICOAGULATION VISIT (OUTPATIENT)
Dept: CARDIOLOGY | Facility: CLINIC | Age: 71
End: 2021-09-20

## 2021-09-20 ENCOUNTER — APPOINTMENT (OUTPATIENT)
Dept: GENERAL RADIOLOGY | Facility: HOSPITAL | Age: 71
End: 2021-09-20

## 2021-09-20 ENCOUNTER — HOSPITAL ENCOUNTER (OUTPATIENT)
Facility: HOSPITAL | Age: 71
Setting detail: HOSPITAL OUTPATIENT SURGERY
Discharge: HOME | End: 2021-09-20
Attending: INTERNAL MEDICINE | Admitting: INTERNAL MEDICINE

## 2021-09-20 VITALS
SYSTOLIC BLOOD PRESSURE: 107 MMHG | RESPIRATION RATE: 16 BRPM | BODY MASS INDEX: 23.59 KG/M2 | DIASTOLIC BLOOD PRESSURE: 72 MMHG | HEIGHT: 73 IN | OXYGEN SATURATION: 97 % | WEIGHT: 178 LBS | HEART RATE: 65 BPM | TEMPERATURE: 98.2 F

## 2021-09-20 DIAGNOSIS — Z79.01 LONG TERM (CURRENT) USE OF ANTICOAGULANTS: ICD-10-CM

## 2021-09-20 DIAGNOSIS — R42 DIZZINESS: ICD-10-CM

## 2021-09-20 DIAGNOSIS — I48.0 PAROXYSMAL ATRIAL FIBRILLATION (HCC): Primary | ICD-10-CM

## 2021-09-20 DIAGNOSIS — I48.0 PAROXYSMAL ATRIAL FIBRILLATION (HCC): ICD-10-CM

## 2021-09-20 DIAGNOSIS — I45.9 HEART BLOCK: ICD-10-CM

## 2021-09-20 LAB
ANION GAP SERPL CALCULATED.3IONS-SCNC: 9.3 MMOL/L (ref 5–15)
BASOPHILS # BLD AUTO: 0.02 10*3/MM3 (ref 0–0.2)
BASOPHILS NFR BLD AUTO: 0.3 % (ref 0–1.5)
BUN SERPL-MCNC: 18 MG/DL (ref 8–23)
BUN/CREAT SERPL: 17.1 (ref 7–25)
CALCIUM SPEC-SCNC: 9.3 MG/DL (ref 8.6–10.5)
CHLORIDE SERPL-SCNC: 104 MMOL/L (ref 98–107)
CO2 SERPL-SCNC: 27.7 MMOL/L (ref 22–29)
CREAT SERPL-MCNC: 1.05 MG/DL (ref 0.76–1.27)
DEPRECATED RDW RBC AUTO: 44.5 FL (ref 37–54)
EOSINOPHIL # BLD AUTO: 0.11 10*3/MM3 (ref 0–0.4)
EOSINOPHIL NFR BLD AUTO: 1.8 % (ref 0.3–6.2)
ERYTHROCYTE [DISTWIDTH] IN BLOOD BY AUTOMATED COUNT: 11.8 % (ref 12.3–15.4)
GFR SERPL CREATININE-BSD FRML MDRD: 69 ML/MIN/1.73
GLUCOSE SERPL-MCNC: 100 MG/DL (ref 65–99)
HCT VFR BLD AUTO: 43.1 % (ref 37.5–51)
HGB BLD-MCNC: 13.8 G/DL (ref 13–17.7)
IMM GRANULOCYTES # BLD AUTO: 0.01 10*3/MM3 (ref 0–0.05)
IMM GRANULOCYTES NFR BLD AUTO: 0.2 % (ref 0–0.5)
INR PPP: 2.2 (ref 0.8–1.2)
INR PPP: 2.2 (ref 0.9–1.1)
LYMPHOCYTES # BLD AUTO: 1.68 10*3/MM3 (ref 0.7–3.1)
LYMPHOCYTES NFR BLD AUTO: 27 % (ref 19.6–45.3)
MCH RBC QN AUTO: 32.5 PG (ref 26.6–33)
MCHC RBC AUTO-ENTMCNC: 32 G/DL (ref 31.5–35.7)
MCV RBC AUTO: 101.7 FL (ref 79–97)
MONOCYTES # BLD AUTO: 0.6 10*3/MM3 (ref 0.1–0.9)
MONOCYTES NFR BLD AUTO: 9.6 % (ref 5–12)
NEUTROPHILS NFR BLD AUTO: 3.81 10*3/MM3 (ref 1.7–7)
NEUTROPHILS NFR BLD AUTO: 61.1 % (ref 42.7–76)
NRBC BLD AUTO-RTO: 0.2 /100 WBC (ref 0–0.2)
PLATELET # BLD AUTO: 237 10*3/MM3 (ref 140–450)
PMV BLD AUTO: 9 FL (ref 6–12)
POTASSIUM SERPL-SCNC: 4.7 MMOL/L (ref 3.5–5.2)
PROTHROMBIN TIME: 25 SECONDS
PROTHROMBIN TIME: 25 SECONDS (ref 12.8–15.2)
RBC # BLD AUTO: 4.24 10*6/MM3 (ref 4.14–5.8)
SODIUM SERPL-SCNC: 141 MMOL/L (ref 136–145)
WBC # BLD AUTO: 6.23 10*3/MM3 (ref 3.4–10.8)

## 2021-09-20 PROCEDURE — 33208 INSRT HEART PM ATRIAL & VENT: CPT | Performed by: INTERNAL MEDICINE

## 2021-09-20 PROCEDURE — C1898 LEAD, PMKR, OTHER THAN TRANS: HCPCS | Performed by: INTERNAL MEDICINE

## 2021-09-20 PROCEDURE — 25010000002 MIDAZOLAM PER 1 MG: Performed by: INTERNAL MEDICINE

## 2021-09-20 PROCEDURE — 25010000003 LIDOCAINE 1 % SOLUTION: Performed by: INTERNAL MEDICINE

## 2021-09-20 PROCEDURE — C1889 IMPLANT/INSERT DEVICE, NOC: HCPCS | Performed by: INTERNAL MEDICINE

## 2021-09-20 PROCEDURE — 71045 X-RAY EXAM CHEST 1 VIEW: CPT

## 2021-09-20 PROCEDURE — 85610 PROTHROMBIN TIME: CPT

## 2021-09-20 PROCEDURE — 85025 COMPLETE CBC W/AUTO DIFF WBC: CPT | Performed by: INTERNAL MEDICINE

## 2021-09-20 PROCEDURE — 99152 MOD SED SAME PHYS/QHP 5/>YRS: CPT | Performed by: INTERNAL MEDICINE

## 2021-09-20 PROCEDURE — C1894 INTRO/SHEATH, NON-LASER: HCPCS | Performed by: INTERNAL MEDICINE

## 2021-09-20 PROCEDURE — 99153 MOD SED SAME PHYS/QHP EA: CPT | Performed by: INTERNAL MEDICINE

## 2021-09-20 PROCEDURE — 25010000003 CEFAZOLIN IN DEXTROSE 2-4 GM/100ML-% SOLUTION: Performed by: INTERNAL MEDICINE

## 2021-09-20 PROCEDURE — C1785 PMKR, DUAL, RATE-RESP: HCPCS | Performed by: INTERNAL MEDICINE

## 2021-09-20 PROCEDURE — 25010000002 FENTANYL CITRATE (PF) 50 MCG/ML SOLUTION: Performed by: INTERNAL MEDICINE

## 2021-09-20 PROCEDURE — 80048 BASIC METABOLIC PNL TOTAL CA: CPT | Performed by: INTERNAL MEDICINE

## 2021-09-20 DEVICE — FLOSEAL HEMOSTATIC MATRIX, 10ML
Type: IMPLANTABLE DEVICE | Status: FUNCTIONAL
Brand: FLOSEAL HEMOSTATIC MATRIX

## 2021-09-20 DEVICE — LD PM TENDRIL STS 6F52CM 2088TC52: Type: IMPLANTABLE DEVICE | Status: FUNCTIONAL

## 2021-09-20 DEVICE — GEN PM ASSURITY MRI DR RF PM2272: Type: IMPLANTABLE DEVICE | Status: FUNCTIONAL

## 2021-09-20 DEVICE — LD PM TENDRIL STS 6F58CM 2088TC58: Type: IMPLANTABLE DEVICE | Status: FUNCTIONAL

## 2021-09-20 RX ORDER — CEFAZOLIN SODIUM 2 G/100ML
INJECTION, SOLUTION INTRAVENOUS CONTINUOUS PRN
Status: COMPLETED | OUTPATIENT
Start: 2021-09-20 | End: 2021-09-20

## 2021-09-20 RX ORDER — SODIUM CHLORIDE 0.9 % (FLUSH) 0.9 %
3 SYRINGE (ML) INJECTION EVERY 12 HOURS SCHEDULED
Status: DISCONTINUED | OUTPATIENT
Start: 2021-09-20 | End: 2021-09-20 | Stop reason: HOSPADM

## 2021-09-20 RX ORDER — CEFAZOLIN SODIUM 2 G/100ML
2 INJECTION, SOLUTION INTRAVENOUS ONCE
Status: DISCONTINUED | OUTPATIENT
Start: 2021-09-20 | End: 2021-09-20 | Stop reason: HOSPADM

## 2021-09-20 RX ORDER — CEPHALEXIN 500 MG/1
500 CAPSULE ORAL 4 TIMES DAILY
Qty: 12 CAPSULE | Refills: 0 | Status: SHIPPED | OUTPATIENT
Start: 2021-09-20 | End: 2021-09-23

## 2021-09-20 RX ORDER — FENTANYL CITRATE 50 UG/ML
INJECTION, SOLUTION INTRAMUSCULAR; INTRAVENOUS AS NEEDED
Status: DISCONTINUED | OUTPATIENT
Start: 2021-09-20 | End: 2021-09-20 | Stop reason: HOSPADM

## 2021-09-20 RX ORDER — LIDOCAINE HYDROCHLORIDE 10 MG/ML
INJECTION, SOLUTION INFILTRATION; PERINEURAL AS NEEDED
Status: DISCONTINUED | OUTPATIENT
Start: 2021-09-20 | End: 2021-09-20 | Stop reason: HOSPADM

## 2021-09-20 RX ORDER — SODIUM CHLORIDE 9 MG/ML
75 INJECTION, SOLUTION INTRAVENOUS CONTINUOUS
Status: DISCONTINUED | OUTPATIENT
Start: 2021-09-20 | End: 2021-09-20 | Stop reason: HOSPADM

## 2021-09-20 RX ORDER — MIDAZOLAM HYDROCHLORIDE 1 MG/ML
INJECTION INTRAMUSCULAR; INTRAVENOUS AS NEEDED
Status: DISCONTINUED | OUTPATIENT
Start: 2021-09-20 | End: 2021-09-20 | Stop reason: HOSPADM

## 2021-09-20 RX ORDER — SODIUM CHLORIDE 0.9 % (FLUSH) 0.9 %
10 SYRINGE (ML) INJECTION AS NEEDED
Status: DISCONTINUED | OUTPATIENT
Start: 2021-09-20 | End: 2021-09-20 | Stop reason: HOSPADM

## 2021-09-20 RX ADMIN — SODIUM CHLORIDE 75 ML/HR: 9 INJECTION, SOLUTION INTRAVENOUS at 12:39

## 2021-09-20 NOTE — DISCHARGE SUMMARY
\[facility]\"" [facility]    DISCHARGE SUMMARY      Patient Name: Victor Manuel Mendoza  :1950  71 y.o.    Date of Admit: 2021  Date of Discharge:  2021    Discharge Diagnosis:  Problems Addressed this Visit        Cardiac and Vasculature    Paroxysmal atrial fibrillation (CMS/HCC) (Chronic)    Relevant Orders    EP/CRM Study    Heart block    Relevant Orders    EP/CRM Study       Symptoms and Signs    Dizziness (Chronic)    Relevant Orders    EP/CRM Study      Diagnoses       Codes Comments    Heart block     ICD-10-CM: I45.9  ICD-9-CM: 426.9     Dizziness     ICD-10-CM: R42  ICD-9-CM: 780.4     Paroxysmal atrial fibrillation (CMS/HCC)     ICD-10-CM: I48.0  ICD-9-CM: 427.31           Hospital Course: Patient had uneventful pacemaker implant.      Procedures Performed  Procedure(s):  Pacemaker SC new/SJM       Consults     No orders found from 2021 to 2021.          Pertinent Test Results:   Results from last 7 days   Lab Units 21  1231   SODIUM mmol/L 141   POTASSIUM mmol/L 4.7   CHLORIDE mmol/L 104   CO2 mmol/L 27.7   BUN mg/dL 18   CREATININE mg/dL 1.05   CALCIUM mg/dL 9.3   GLUCOSE mg/dL 100*         @LABRCNT(bnp)@  Results from last 7 days   Lab Units 21  1231   WBC 10*3/mm3 6.23   HEMOGLOBIN g/dL 13.8   HEMATOCRIT % 43.1   PLATELETS 10*3/mm3 237     Results from last 7 days   Lab Units 21  1221 21  1220 21  1526   INR  2.2* 2.2* 3.89*               Condition on Discharge: stable    Discharge Medications     Discharge Medications      New Medications      Instructions Start Date   cephalexin 500 MG capsule  Commonly known as: Keflex   500 mg, Oral, 4 Times Daily         Changes to Medications      Instructions Start Date   levothyroxine 25 MCG tablet  Commonly known as: SYNTHROID, LEVOTHROID  What changed: how much to take   TAKE 1.5 TABLETS BY MOUTH EVERY DAY      warfarin 5 MG tablet  Commonly known as: COUMADIN  What changed:   · how much to take  · how  to take this  · when to take this   TAKE 1 TABLET BY MOUTH EVERY NIGHT EXCEPT 1.5 TABLETS ON Monday.         Continue These Medications      Instructions Start Date   citalopram 10 MG tablet  Commonly known as: CeleXA   1 tablet, Oral, Daily      finasteride 5 MG tablet  Commonly known as: PROSCAR   TAKE 1 TABLET BY MOUTH EVERY DAY      Flax Seed Oil 1300 MG capsule   Oral      irbesartan 300 MG tablet  Commonly known as: AVAPRO   300 mg, Oral, Daily      multivitamin tablet tablet  Commonly known as: THERAGRAN   Oral      pravastatin 40 MG tablet  Commonly known as: PRAVACHOL   40 mg, Oral, Daily      sotalol 80 MG tablet tablet  Commonly known as: BETAPACE AF   40 mg, Oral, 2 Times Daily      sulfaSALAzine 500 MG tablet  Commonly known as: AZULFIDINE   500 mg, Oral, Daily      vitamin B-12 1000 MCG tablet  Commonly known as: CYANOCOBALAMIN   1,000 mcg, Oral, Daily             Discharge Diet:     Activity at Discharge:     Discharge disposition: home    Follow-up Appointments  Future Appointments   Date Time Provider Department Center   10/7/2021  8:00 AM Clarence Ramírez MD [facility]   10/8/2021  1:15 PM Ciro Bland MD [facility]   12/3/2021  4:15 PM Ciro Bland MD [facility]         Test Results Pending at Discharge       Clarence Ramírez MD, [facility]    09/20/21  13:37 EDT

## 2021-09-20 NOTE — H&P
Patient Care Team:  Ciro Bland MD as PCP - General (Internal Medicine)  Danielle Scott MD as Consulting Physician (Cardiology)  Deam, Clarence Jluian MD as Consulting Physician (Cardiac Electrophysiology)    Chief complaint Presents for pacemaker    Subjective     History of Present Illness   Mr Mendoza is a 70 yo patient of Dr. Danielle Scott who is referred for an abnormal event monitor.  His past medical  History is significant for HTN, HLD and paroxysmal atrial fibrillation which he has had for several years.  He has been on sotalol and coumadin.  Recently he has been experiencing symptoms of lightheadedness and dizziness but no syncope.  I have reviewed the EKG tracings from the event monitor which showed continuouis AF with rates ranging from  36-160bpm.  He also had 7 pauses, the longest being 3.4s.     His prior cardiac evaluation has also included an echo 8/21 which showed an EF of 60%, mild-moderate MR with severe LAE and mild TR with moderate-severe IGNACIO.  Estimated RVSP of 35-45mmHg.       Review of Systems   Constitutional: Positive for fatigue.   Respiratory: Positive for shortness of breath.    Cardiovascular: Positive for palpitations.   Neurological: Positive for light-headedness.   All other systems reviewed and are negative.         Past Medical History:   Diagnosis Date   • Allergic    • Anxiety    • Arthritis    • Atrial fibrillation (CMS/HCC)    • Bladder disorder    • Cataract    • Coronary artery disease    • HBP (high blood pressure)    • Heart disease    • High cholesterol    • HL (hearing loss)    • Hyperlipidemia    • Hypertension    • Hypothyroidism    • Low back pain    • Peptic ulceration    • Rectal bleeding    • Ulcer (traumatic) of oral mucosa    • Visual impairment        Objective      Vital Signs       Physical Exam  Constitutional:       Appearance: Normal appearance.   HENT:      Head: Normocephalic and atraumatic.      Nose: Nose normal.      Mouth/Throat:      Mouth:  Mucous membranes are moist.   Eyes:      Extraocular Movements: Extraocular movements intact.      Pupils: Pupils are equal, round, and reactive to light.   Cardiovascular:      Rate and Rhythm: Rhythm irregular.   Pulmonary:      Breath sounds: Normal breath sounds.   Abdominal:      Palpations: Abdomen is soft.   Musculoskeletal:         General: Normal range of motion.      Cervical back: Neck supple.   Skin:     General: Skin is warm and dry.   Neurological:      General: No focal deficit present.      Mental Status: He is alert and oriented to person, place, and time.   Psychiatric:         Mood and Affect: Mood normal.         Results Review:    I reviewed the patient's new clinical results.      Assessment/Plan       Paroxysmal atrial fibrillation (CMS/HCC)    Dizziness    Heart block      Assessment & Plan   1. Persistent AF with tachy-staci syndrome - symptoms of lightheadedness likely related to bradycardia and pauses.  Discussed indications, risks and benefits of pacemaker implant.  After his pacemaker is implanted we can see what his AF burden is and decide if we should switch to rate control only and switch from sotalol to metoprolol.     2. HTN - controlled    I discussed the patients findings and my recommendations with patient    Clarence Eliel Ramírez MD  09/20/21  05:52 EDT

## 2021-09-20 NOTE — DISCHARGE INSTRUCTIONS
"  **SEE PACEMAKER BOOKLET, CARD AND BOX**    Post Pacemaker / Defibrillator Implant Instructions      1.  The dressing may be removed the next day.    2. If steri-strips were used, they should not be removed. Allow them to \"fall off\".      3. You may shower in 3 DAYS. Do not allow shower water to hit directly on incision.    4. No lotion/powder/ointment/cream on incision until it is healed.    5. Gently wash incision daily with soap and water and pat dry.    6. You may reapply a dressing if there is drainage, otherwise leave your incision open to air.     7. No heavy lifting, pulling, or pushing.    8. Do not raise the affected arm over your head for a minimum of 1 month.    9. Please call the office if you experience any of the following:   bleeding or drainage from your incision   swelling, redness, or opening of your incision   fever or chills   pain not relieved with medication   chest pain or difficulty breathing   lightheadness    "

## 2021-09-20 NOTE — PERIOPERATIVE NURSING NOTE
Pt to restart coumadin tomorrow evening, 9/21/21 per Dr Ramírez's instruction, no yoga until discussed at follow up appt, information given to pt and family, pt and family verbalized understanding

## 2021-09-24 ENCOUNTER — OFFICE VISIT (OUTPATIENT)
Dept: ORTHOPEDIC SURGERY | Facility: CLINIC | Age: 71
End: 2021-09-24

## 2021-09-24 VITALS — HEIGHT: 73 IN | BODY MASS INDEX: 23.59 KG/M2 | WEIGHT: 178 LBS

## 2021-09-24 DIAGNOSIS — Z96.611 HISTORY OF TOTAL SHOULDER REPLACEMENT, RIGHT: Primary | ICD-10-CM

## 2021-09-24 DIAGNOSIS — M87.9: ICD-10-CM

## 2021-09-24 PROCEDURE — 99202 OFFICE O/P NEW SF 15 MIN: CPT | Performed by: ORTHOPAEDIC SURGERY

## 2021-09-24 NOTE — PROGRESS NOTES
"Chief Complaint  Initial Evaluation of the Right Shoulder     Subjective      Victor Manuel Mendoza presents to [facility] for an evaluation of right shoulder. Patient had a right shoulder replacement performed 10 years ago in [place]. Patient states that he started to have pain in his right shoulder. He denies any injury or trauma to the right shoulder. He states pain has progressively gotten worse in the last month. He states sharp pains with forward elevation, abduction and external rotation.     No Known Allergies     Social History     Socioeconomic History   • Marital status:      Spouse name: Not on file   • Number of children: Not on file   • Years of education: Not on file   • Highest education level: Not on file   Tobacco Use   • Smoking status: Never Smoker   • Smokeless tobacco: Never Used   Vaping Use   • Vaping Use: Never used   Substance and Sexual Activity   • Alcohol use: Not Currently     Alcohol/week: 0.0 standard drinks   • Drug use: No   • Sexual activity: Not Currently     Partners: Female        Review of Systems     Objective   Vital Signs:   Ht 185.4 cm (73\")   Wt 80.7 kg (178 lb)   BMI 23.48 kg/m²       Physical Exam  Constitutional:       Appearance: Normal appearance. Patient is well-developed and normal weight.   HENT:      Head: Normocephalic.      Right Ear: Hearing and external ear normal.      Left Ear: Hearing and external ear normal.      Nose: Nose normal.   Eyes:      Conjunctiva/sclera: Conjunctivae normal.   Cardiovascular:      Rate and Rhythm: Normal rate.   Pulmonary:      Effort: Pulmonary effort is normal.      Breath sounds: No wheezing or rales.   Abdominal:      Palpations: Abdomen is soft.      Tenderness: There is no abdominal tenderness.   Musculoskeletal:      Cervical back: Normal range of motion.   Skin:     Findings: No rash.   Neurological:      Mental Status: Patient  is alert and oriented to person, place, and time. "   Psychiatric:         Mood and Affect: Mood and affect normal.         Judgment: Judgment normal.       Ortho Exam      RIGHT SHOULDER: IR to SI joint. Forward elevation to 150 degrees. Good tone of deltoid, biceps, triceps, wrist extensors, and wrist flexors.  Scars well healed. Abduction to 90 degrees. Radial pulse 2+, ulnar pulse 2+. Full elbow flexion and extension. No swelling, skin discoloration or atrophy. Full cervical range of motion. No winging of the scapula. Pain with shoulder motion.       Procedures        Imaging Results (Most Recent)     None           Result Review :         XR Shoulder 2+ View Right    Result Date: 9/16/2021  Narrative: PROCEDURE: XR SHOULDER 2+ VW RIGHT  COMPARISON: [facility], , CHEST AP/PA 1 VIEW, 9/29/2015, 21:06.  INDICATIONS: ANTERIOR RIGHT SHOULDER PAIN FOR 2 MONTHS, NO INJURY. REPLACEMENT OF SHOULDER OVER 10 YEARS AGO.  FINDINGS:  Left TSA and proximal humeral cerclage wire with nonspecific lucency at the lateral proximal humeral component measuring up to 5 mm in width.  There is mild anterior subluxation of the humeral component in relation to the glenoid component.  No evidence of acute fracture or dislocation.  Mild to moderate hypertrophic degenerative changes at the acromioclavicular joint.  The included right lung apex is clear.  CONCLUSION:  1. Right TSA with lucency at the lateral proximal humeral component that could reflect loosening or infection. 2. Mild anterior subluxation of the humeral component in relation to the glenoid. 3. No radiographic evidence of acute fracture or dislocation.      EMILY DUBOSE MD       Electronically Signed and Approved By: EMILY DUBOSE MD on 9/16/2021 at 14:47              It is noted that in the x-ray reading, they stated left TSA but throughout the report it speaks of the right shoulder. Dr. Lee read the x-rays. There appears to be a lucency of the right total shoulder arthroplasty. The humeral head is migrating  up north.     Assessment and Plan     DX: History of a total shoulder replacement, right  Lucency of right total shoulder replacement     Patient has some lucency of his right TSA. Our office will find where patient initially had his right TSA performed at. We will refer him back to [facility].     Call or return if worsening symptoms.    Follow Up     PRN.       Patient was given instructions and counseling regarding his condition or for health maintenance advice. Please see specific information pulled into the AVS if appropriate.     Scribed for Kirby Lee MD by Elda Kuo.  09/24/21   14:01 EDT      I have personally performed the services described in this document as scribed by the above individual and it is both accurate and complete. Kirby Lee MD 09/24/21

## 2021-09-27 ENCOUNTER — TELEPHONE (OUTPATIENT)
Dept: ORTHOPEDIC SURGERY | Facility: CLINIC | Age: 71
End: 2021-09-27

## 2021-09-27 NOTE — TELEPHONE ENCOUNTER
BEEN REFERRED Rahat PINKY TO DR. LUIS KAM WITH [facility], [facility]. PATIENT WAS CALLED WITH APPOINTMENT DETAILS.     APPT DATE: 10/18/2021   APPT TIME: 2:00PM    [facility]   [address] [facility] [place] [address]    PHONE: [phone]

## 2021-10-07 ENCOUNTER — OFFICE VISIT (OUTPATIENT)
Dept: CARDIOLOGY | Facility: CLINIC | Age: 71
End: 2021-10-07

## 2021-10-07 VITALS
HEART RATE: 84 BPM | SYSTOLIC BLOOD PRESSURE: 126 MMHG | DIASTOLIC BLOOD PRESSURE: 86 MMHG | HEIGHT: 73 IN | WEIGHT: 183 LBS | RESPIRATION RATE: 18 BRPM | BODY MASS INDEX: 24.25 KG/M2

## 2021-10-07 DIAGNOSIS — I48.0 PAROXYSMAL ATRIAL FIBRILLATION (HCC): Primary | Chronic | ICD-10-CM

## 2021-10-07 DIAGNOSIS — Z95.0 PRESENCE OF CARDIAC PACEMAKER: ICD-10-CM

## 2021-10-07 PROBLEM — I49.5 TACHY-BRADY SYNDROME: Status: ACTIVE | Noted: 2021-10-07

## 2021-10-07 PROCEDURE — 99024 POSTOP FOLLOW-UP VISIT: CPT | Performed by: INTERNAL MEDICINE

## 2021-10-07 PROCEDURE — 93288 INTERROG EVL PM/LDLS PM IP: CPT | Performed by: INTERNAL MEDICINE

## 2021-10-07 RX ORDER — METOPROLOL SUCCINATE 25 MG/1
25 TABLET, EXTENDED RELEASE ORAL DAILY
Qty: 30 TABLET | Refills: 11 | Status: SHIPPED | OUTPATIENT
Start: 2021-10-07

## 2021-10-07 NOTE — PROGRESS NOTES
Subjective:     Encounter Date:10/07/2021      Patient ID: Victor Manuel Mendoza is a 71 y.o. male.    Chief Complaint:  Chief Complaint   Patient presents with   • Pacemaker Check     Implant 9/20/2021       HPI:  Mr Mendoza is a 70 yo patient of Dr. Danielle Scott who is referred for an abnormal event monitor.  His past medical  History is significant for HTN, HLD and paroxysmal atrial fibrillation which he has had for several years.  He has been on sotalol and coumadin.  Recently he has been experiencing symptoms of lightheadedness and dizziness but no syncope.  I have reviewed the EKG tracings from the event monitor which showed continuouis AF with rates ranging from  36-160bpm.  He also had 7 pauses, the longest being 3.4s.     His prior cardiac evaluation has also included an echo 8/21 which showed an EF of 60%, mild-moderate MR with severe LAE and mild TR with moderate-severe IGNACIO.  Estimated RVSP of 35-45mmHg.    He underwent pacemaker implant 2 weeks ago and has been doing well since then.       The following portions of the patient's history were reviewed and updated as appropriate: allergies, current medications, past family history, past medical history, past social history, past surgical history and problem list.    Problem List:  Patient Active Problem List   Diagnosis   • Paroxysmal atrial fibrillation (HCC)   • Essential hypertension   • Thyroid activity decreased   • Bradycardia, sinus   • Long term (current) use of anticoagulants [Z79.01]   • Dyslipidemia   • Dizziness   • Long-term use of high-risk medication   • Benign prostatic hyperplasia without lower urinary tract symptoms   • Memory changes   • Heart block   • Tachy-staci syndrome (HCC)   • Presence of cardiac pacemaker       Past Medical History:  Past Medical History:   Diagnosis Date   • Allergic    • Anxiety    • Arrhythmia    • Arthritis    • Atrial fibrillation (HCC)    • Bladder disorder    • Cataract    • Coronary artery disease    • HBP  "(high blood pressure)    • Heart disease    • High cholesterol    • HL (hearing loss)    • Hyperlipidemia    • Hypertension    • Hypothyroidism    • Low back pain    • Peptic ulceration    • Rectal bleeding    • Sleep apnea     USES CPAP   • Ulcer (traumatic) of oral mucosa    • Visual impairment        Past Surgical History:  Past Surgical History:   Procedure Laterality Date   • CARDIAC ELECTROPHYSIOLOGY PROCEDURE N/A 9/20/2021    Procedure: Pacemaker SC new/SJM;  Surgeon: Clarence Ramírez MD;  Location: [facility] INVASIVE LOCATION;  Service: Cardiology;  Laterality: N/A;   • CATARACT EXTRACTION WITH INTRAOCULAR LENS IMPLANT Bilateral    • COLONOSCOPY     • ENDOSCOPY     • EYE SURGERY  11/1/2020   • SHOULDER SURGERY Right     REPLACEMENT   • SHOULDER SURGERY Left        Social History:  Social History     Socioeconomic History   • Marital status:      Spouse name: Not on file   • Number of children: Not on file   • Years of education: Not on file   • Highest education level: Not on file   Tobacco Use   • Smoking status: Never Smoker   • Smokeless tobacco: Never Used   Vaping Use   • Vaping Use: Never used   Substance and Sexual Activity   • Alcohol use: Not Currently     Alcohol/week: 0.0 standard drinks   • Drug use: No   • Sexual activity: Not Currently     Partners: Female       Allergies:  No Known Allergies    Immunizations:    There is no immunization history on file for this patient.    ROS:  Review of Systems   Constitutional: Negative for malaise/fatigue.   Cardiovascular: Negative for chest pain, dyspnea on exertion, irregular heartbeat, leg swelling, near-syncope, orthopnea, palpitations, paroxysmal nocturnal dyspnea and syncope.   Respiratory: Negative for shortness of breath.    All other systems reviewed and are negative.         Objective:         /86   Pulse 84   Resp 18   Ht 185.4 cm (73\")   Wt 83 kg (183 lb)   BMI 24.14 kg/m²     Constitutional:       General: Not in acute " distress.     Appearance: Well-developed.   Eyes:      General: No scleral icterus.     Conjunctiva/sclera: Conjunctivae normal.      Pupils: Pupils are equal, round, and reactive to light.   HENT:      Head: Normocephalic and atraumatic.   Neck:      Thyroid: No thyromegaly.   Pulmonary:      Effort: Pulmonary effort is normal.      Breath sounds: Normal breath sounds.   Cardiovascular:      Normal rate. Regular rhythm.   Abdominal:      General: Bowel sounds are normal.      Palpations: Abdomen is soft.   Musculoskeletal: Normal range of motion.      Cervical back: Normal range of motion. Skin:     General: Skin is warm and dry.   Neurological:      Mental Status: Alert and oriented to person, place, and time.         In-Office Procedure(s):  Procedures  Pacemaker Eval interpreted by me  SJM [id number]  Battery ALEXANDR    P wave 1.5mv  Threshold AF  Impedance 440 ohms    R wave 7mv  Threshold 0.5V  Impedance 430 ohms    Events - continuous AF    ASCVD RIsk Score::  The 10-year ASCVD risk score (Libby MONZON Jr., et al., 2013) is: 20.9%    Values used to calculate the score:      Age: 71 years      Sex: Male      Is Non- : No      Diabetic: No      Tobacco smoker: No      Systolic Blood Pressure: 126 mmHg      Is BP treated: Yes      HDL Cholesterol: 52 mg/dL      Total Cholesterol: 162 mg/dL    Recent Radiology:  Imaging Results (Most Recent)     None          Lab Review:   Admission on 09/20/2021, Discharged on 09/20/2021   Component Date Value   • Glucose 09/20/2021 100*   • BUN 09/20/2021 18    • Creatinine 09/20/2021 1.05    • Sodium 09/20/2021 141    • Potassium 09/20/2021 4.7    • Chloride 09/20/2021 104    • CO2 09/20/2021 27.7    • Calcium 09/20/2021 9.3    • eGFR Non  Amer 09/20/2021 69    • BUN/Creatinine Ratio 09/20/2021 17.1    • Anion Gap 09/20/2021 9.3    • WBC 09/20/2021 6.23    • RBC 09/20/2021 4.24    • Hemoglobin 09/20/2021 13.8    • Hematocrit 09/20/2021 43.1    • MCV 09/20/2021  101.7*   • MCH 09/20/2021 32.5    • MCHC 09/20/2021 32.0    • RDW 09/20/2021 11.8*   • RDW-SD 09/20/2021 44.5    • MPV 09/20/2021 9.0    • Platelets 09/20/2021 237    • Neutrophil % 09/20/2021 61.1    • Lymphocyte % 09/20/2021 27.0    • Monocyte % 09/20/2021 9.6    • Eosinophil % 09/20/2021 1.8    • Basophil % 09/20/2021 0.3    • Immature Grans % 09/20/2021 0.2    • Neutrophils, Absolute 09/20/2021 3.81    • Lymphocytes, Absolute 09/20/2021 1.68    • Monocytes, Absolute 09/20/2021 0.60    • Eosinophils, Absolute 09/20/2021 0.11    • Basophils, Absolute 09/20/2021 0.02    • Immature Grans, Absolute 09/20/2021 0.01    • nRBC 09/20/2021 0.2    • Protime 09/20/2021 25.0    • INR 09/20/2021 2.2*   • Protime 09/20/2021 25.0*   • INR 09/20/2021 2.2*   Lab on 09/17/2021   Component Date Value   • Protime 09/17/2021 39.1*   • INR 09/17/2021 3.89*   Lab on 09/17/2021   Component Date Value   • COVID19 09/17/2021 Not Detected    Anticoagulation Visit on 09/03/2021   Component Date Value   • INR 09/03/2021 2.60    Office Visit on 09/02/2021   Component Date Value   • Vitamin B-12 09/02/2021 1,143*   • Folate 09/02/2021 >20.00    • INR 09/02/2021 2.6*   Hospital Outpatient Visit on 08/30/2021   Component Date Value   • BSA 08/30/2021 2.0    • IVSd 08/30/2021 1.1    • LVIDd 08/30/2021 4.6    • LVIDs 08/30/2021 3.0    • LVPWd 08/30/2021 0.9    • IVS/LVPW 08/30/2021 1.2    • FS 08/30/2021 34.8    • EDV(Teich) 08/30/2021 97.3    • ESV(Teich) 08/30/2021 35.0    • EF(Teich) 08/30/2021 64.0    • EDV(cubed) 08/30/2021 97.3    • ESV(cubed) 08/30/2021 27.0    • EF(cubed) 08/30/2021 72.3    • LV mass(C)d 08/30/2021 158.8    • LV mass(C)dI 08/30/2021 77.6    • SV(Teich) 08/30/2021 62.3    • SI(Teich) 08/30/2021 30.5    • SV(cubed) 08/30/2021 70.3    • SI(cubed) 08/30/2021 34.4    • Ao root diam 08/30/2021 3.9    • Ao root area 08/30/2021 11.9    • ACS 08/30/2021 2.0    • asc Aorta Diam 08/30/2021 3.5    • LVOT diam 08/30/2021 2.3    • LVOT  area 08/30/2021 4.2    • LVOT area(traced) 08/30/2021 4.2    • LVLd ap4 08/30/2021 7.6    • EDV(MOD-sp4) 08/30/2021 72.0    • LVLs ap4 08/30/2021 6.8    • ESV(MOD-sp4) 08/30/2021 32.0    • EF(MOD-sp4) 08/30/2021 55.6    • LVLd ap2 08/30/2021 8.2    • EDV(MOD-sp2) 08/30/2021 89.0    • LVLs ap2 08/30/2021 7.0    • ESV(MOD-sp2) 08/30/2021 34.0    • EF(MOD-sp2) 08/30/2021 61.8    • SV(MOD-sp4) 08/30/2021 40.0    • SI(MOD-sp4) 08/30/2021 19.5    • SV(MOD-sp2) 08/30/2021 55.0    • SI(MOD-sp2) 08/30/2021 26.9    • Ao root area (BSA correc* 08/30/2021 1.9    • LV Tobin Vol (BSA correct* 08/30/2021 35.2    • LV Sys Vol (BSA correcte* 08/30/2021 15.6    • EF(MOD-bp) 08/30/2021 59.6    • MV E max anselmo 08/30/2021 90.0    • MV V2 max 08/30/2021 106.0    • MV max PG 08/30/2021 4.5    • MV V2 mean 08/30/2021 53.7    • MV mean PG 08/30/2021 1.0    • MV V2 VTI 08/30/2021 22.4    • MVA(VTI) 08/30/2021 2.5    • MV P1/2t max anselmo 08/30/2021 115.0    • MV P1/2t 08/30/2021 62.0    • MVA(P1/2t) 08/30/2021 3.5    • MV dec slope 08/30/2021 543.0    • MV dec time 08/30/2021 222    • Ao pk anselmo 08/30/2021 96.5    • Ao max PG 08/30/2021 3.7    • Ao max PG (full) 08/30/2021 1.8    • Ao V2 mean 08/30/2021 66.9    • Ao mean PG 08/30/2021 2.0    • Ao mean PG (full) 08/30/2021 1.0    • Ao V2 VTI 08/30/2021 20.5    • ESTELA(I,A) 08/30/2021 2.7    • ESTELA(I,D) 08/30/2021 2.7    • ESTELA(V,A) 08/30/2021 3.0    • ESTELA(V,D) 08/30/2021 3.0    • LV V1 max PG 08/30/2021 1.9    • LV V1 mean PG 08/30/2021 1.0    • LV V1 max 08/30/2021 69.8    • LV V1 mean 08/30/2021 44.3    • LV V1 VTI 08/30/2021 13.3    • SV(Ao) 08/30/2021 244.9    • SI(Ao) 08/30/2021 119.7    • SV(LVOT) 08/30/2021 55.3    • SI(LVOT) 08/30/2021 27.0    • PA V2 max 08/30/2021 80.7    • PA max PG 08/30/2021 2.6    • PA max PG (full) 08/30/2021 1.5    • PA acc time 08/30/2021 0.12    • RV V1 max PG 08/30/2021 1.1    • RV V1 mean PG 08/30/2021 1.0    • RV V1 max 08/30/2021 52.2    • RV V1 mean 08/30/2021  33.0    • RV V1 VTI 08/30/2021 8.9    • TR max dmitri 08/30/2021 287.0    • RVSP(TR) 08/30/2021 35.9    • RAP systole 08/30/2021 3.0    • PA pr(Accel) 08/30/2021 26.8    • MVA P1/2T LCG 08/30/2021 1.9    • RV Base 08/30/2021 4.0    • RV Length 08/30/2021 6.3    • RV Mid 08/30/2021 2.1    • Med Peak E' Dmitri 08/30/2021 4.6    • RV S' 08/30/2021 8.9    •  CV ECHO ASTER - BZI_BMI 08/30/2021 24.7    •  CV ECHO ASTER - BSA(HA* 08/30/2021 2.1    •  CV ECHO ASTER - BZI_ME* 08/30/2021 82.6    •  CV ECHO ASTER - BZI_ME* 08/30/2021 182.9    • Target HR (85%) 08/30/2021 126    • Max. Pred. HR (100%) 08/30/2021 148    • Sinus 08/30/2021 3.6    • Ascending aorta 08/30/2021 3.5    • Dimensionless Index 08/30/2021 0.60    • LA Volume Index 08/30/2021 61.0    • Abdo Ao Diam 08/30/2021 1.9    • TAPSE (>1.6) 08/30/2021 1.20                 Assessment:          Diagnosis Plan   1. Paroxysmal atrial fibrillation (HCC)     2. Presence of cardiac pacemaker            Plan:      1. Persistent AF with tachy-staci syndrome - at this point his AF appears to be permanent, he is asymptomatic and we are unlikely to be able to restore and maintain sinus rhythm.  Will stop sotalol and begin toprol    2. Pacemaker followup - wound well healed, enrolled in remote monitoring     2. HTN - controlled    He will followup with Dr. Scott in 2 months  RTC 1 year            Level of Care:                 Clarence Ramírez MD  10/07/21  .

## 2021-10-10 ENCOUNTER — APPOINTMENT (OUTPATIENT)
Dept: GENERAL RADIOLOGY | Facility: HOSPITAL | Age: 71
End: 2021-10-10

## 2021-10-10 ENCOUNTER — HOSPITAL ENCOUNTER (OUTPATIENT)
Facility: HOSPITAL | Age: 71
Discharge: HOME | End: 2021-10-12
Attending: EMERGENCY MEDICINE | Admitting: INTERNAL MEDICINE

## 2021-10-10 DIAGNOSIS — R07.89 CHEST TIGHTNESS: Primary | ICD-10-CM

## 2021-10-10 DIAGNOSIS — I10 ESSENTIAL HYPERTENSION: Chronic | ICD-10-CM

## 2021-10-10 PROBLEM — R07.9 CHEST PAIN: Status: ACTIVE | Noted: 2021-10-10

## 2021-10-10 LAB
ALBUMIN SERPL-MCNC: 4.3 G/DL (ref 3.5–5.2)
ALBUMIN/GLOB SERPL: 2 G/DL
ALP SERPL-CCNC: 38 U/L (ref 39–117)
ALT SERPL W P-5'-P-CCNC: 26 U/L (ref 1–41)
ANION GAP SERPL CALCULATED.3IONS-SCNC: 11.7 MMOL/L (ref 5–15)
AST SERPL-CCNC: 28 U/L (ref 1–40)
BASOPHILS # BLD AUTO: 0.03 10*3/MM3 (ref 0–0.2)
BASOPHILS NFR BLD AUTO: 0.4 % (ref 0–1.5)
BILIRUB SERPL-MCNC: 0.6 MG/DL (ref 0–1.2)
BUN SERPL-MCNC: 14 MG/DL (ref 8–23)
BUN/CREAT SERPL: 12.8 (ref 7–25)
CALCIUM SPEC-SCNC: 8.6 MG/DL (ref 8.6–10.5)
CHLORIDE SERPL-SCNC: 105 MMOL/L (ref 98–107)
CK MB SERPL-CCNC: 5.48 NG/ML
CK SERPL-CCNC: 207 U/L (ref 20–200)
CO2 SERPL-SCNC: 22.3 MMOL/L (ref 22–29)
CREAT SERPL-MCNC: 1.09 MG/DL (ref 0.76–1.27)
DEPRECATED RDW RBC AUTO: 46.4 FL (ref 37–54)
EOSINOPHIL # BLD AUTO: 0.1 10*3/MM3 (ref 0–0.4)
EOSINOPHIL NFR BLD AUTO: 1.5 % (ref 0.3–6.2)
ERYTHROCYTE [DISTWIDTH] IN BLOOD BY AUTOMATED COUNT: 12.9 % (ref 12.3–15.4)
GFR SERPL CREATININE-BSD FRML MDRD: 66 ML/MIN/1.73
GLOBULIN UR ELPH-MCNC: 2.2 GM/DL
GLUCOSE SERPL-MCNC: 97 MG/DL (ref 65–99)
HCT VFR BLD AUTO: 37.7 % (ref 37.5–51)
HGB BLD-MCNC: 12.5 G/DL (ref 13–17.7)
HOLD SPECIMEN: NORMAL
IMM GRANULOCYTES # BLD AUTO: 0.01 10*3/MM3 (ref 0–0.05)
IMM GRANULOCYTES NFR BLD AUTO: 0.1 % (ref 0–0.5)
INR PPP: 3.8 (ref 2–3)
LIPASE SERPL-CCNC: 25 U/L (ref 13–60)
LYMPHOCYTES # BLD AUTO: 2.06 10*3/MM3 (ref 0.7–3.1)
LYMPHOCYTES NFR BLD AUTO: 30.2 % (ref 19.6–45.3)
MAGNESIUM SERPL-MCNC: 1.9 MG/DL (ref 1.6–2.4)
MCH RBC QN AUTO: 33 PG (ref 26.6–33)
MCHC RBC AUTO-ENTMCNC: 33.2 G/DL (ref 31.5–35.7)
MCV RBC AUTO: 99.5 FL (ref 79–97)
MONOCYTES # BLD AUTO: 0.71 10*3/MM3 (ref 0.1–0.9)
MONOCYTES NFR BLD AUTO: 10.4 % (ref 5–12)
NEUTROPHILS NFR BLD AUTO: 3.91 10*3/MM3 (ref 1.7–7)
NEUTROPHILS NFR BLD AUTO: 57.4 % (ref 42.7–76)
NRBC BLD AUTO-RTO: 0 /100 WBC (ref 0–0.2)
NT-PROBNP SERPL-MCNC: 3329 PG/ML (ref 0–900)
PLATELET # BLD AUTO: 215 10*3/MM3 (ref 140–450)
PMV BLD AUTO: 9 FL (ref 6–12)
POTASSIUM SERPL-SCNC: 3.7 MMOL/L (ref 3.5–5.2)
PROT SERPL-MCNC: 6.5 G/DL (ref 6–8.5)
PROTHROMBIN TIME: 38.1 SECONDS (ref 9.4–12)
RBC # BLD AUTO: 3.79 10*6/MM3 (ref 4.14–5.8)
SODIUM SERPL-SCNC: 139 MMOL/L (ref 136–145)
TROPONIN I SERPL-MCNC: 0.08 NG/ML (ref 0–0.6)
TROPONIN I SERPL-MCNC: 0.11 NG/ML (ref 0–0.6)
WBC # BLD AUTO: 6.82 10*3/MM3 (ref 3.4–10.8)
WHOLE BLOOD HOLD SPECIMEN: NORMAL
WHOLE BLOOD HOLD SPECIMEN: NORMAL

## 2021-10-10 PROCEDURE — 93010 ELECTROCARDIOGRAM REPORT: CPT | Performed by: INTERNAL MEDICINE

## 2021-10-10 PROCEDURE — 83690 ASSAY OF LIPASE: CPT | Performed by: EMERGENCY MEDICINE

## 2021-10-10 PROCEDURE — 85025 COMPLETE CBC W/AUTO DIFF WBC: CPT | Performed by: EMERGENCY MEDICINE

## 2021-10-10 PROCEDURE — 84484 ASSAY OF TROPONIN QUANT: CPT

## 2021-10-10 PROCEDURE — 83880 ASSAY OF NATRIURETIC PEPTIDE: CPT | Performed by: EMERGENCY MEDICINE

## 2021-10-10 PROCEDURE — 80053 COMPREHEN METABOLIC PANEL: CPT | Performed by: EMERGENCY MEDICINE

## 2021-10-10 PROCEDURE — 96375 TX/PRO/DX INJ NEW DRUG ADDON: CPT

## 2021-10-10 PROCEDURE — 99220 PR INITIAL OBSERVATION CARE/DAY 70 MINUTES: CPT | Performed by: PHYSICIAN ASSISTANT

## 2021-10-10 PROCEDURE — 83735 ASSAY OF MAGNESIUM: CPT | Performed by: EMERGENCY MEDICINE

## 2021-10-10 PROCEDURE — 93005 ELECTROCARDIOGRAM TRACING: CPT | Performed by: EMERGENCY MEDICINE

## 2021-10-10 PROCEDURE — G0378 HOSPITAL OBSERVATION PER HR: HCPCS

## 2021-10-10 PROCEDURE — 85610 PROTHROMBIN TIME: CPT | Performed by: EMERGENCY MEDICINE

## 2021-10-10 PROCEDURE — 71045 X-RAY EXAM CHEST 1 VIEW: CPT

## 2021-10-10 PROCEDURE — 96374 THER/PROPH/DIAG INJ IV PUSH: CPT

## 2021-10-10 PROCEDURE — 82550 ASSAY OF CK (CPK): CPT | Performed by: EMERGENCY MEDICINE

## 2021-10-10 PROCEDURE — 82553 CREATINE MB FRACTION: CPT | Performed by: EMERGENCY MEDICINE

## 2021-10-10 PROCEDURE — 99285 EMERGENCY DEPT VISIT HI MDM: CPT

## 2021-10-10 RX ORDER — ASPIRIN 81 MG/1
324 TABLET, CHEWABLE ORAL ONCE
Status: COMPLETED | OUTPATIENT
Start: 2021-10-10 | End: 2021-10-10

## 2021-10-10 RX ORDER — SODIUM CHLORIDE 0.9 % (FLUSH) 0.9 %
10 SYRINGE (ML) INJECTION AS NEEDED
Status: DISCONTINUED | OUTPATIENT
Start: 2021-10-10 | End: 2021-10-12 | Stop reason: HOSPADM

## 2021-10-10 RX ORDER — FAMOTIDINE 10 MG/ML
20 INJECTION, SOLUTION INTRAVENOUS ONCE
Status: COMPLETED | OUTPATIENT
Start: 2021-10-10 | End: 2021-10-10

## 2021-10-10 RX ORDER — NITROGLYCERIN 0.4 MG/1
0.4 TABLET SUBLINGUAL
Status: DISCONTINUED | OUTPATIENT
Start: 2021-10-10 | End: 2021-10-12 | Stop reason: HOSPADM

## 2021-10-10 RX ORDER — WARFARIN SODIUM 5 MG/1
2.5 TABLET ORAL TAKE AS DIRECTED
COMMUNITY
End: 2021-10-12 | Stop reason: HOSPADM

## 2021-10-10 RX ORDER — ATORVASTATIN CALCIUM 40 MG/1
40 TABLET, FILM COATED ORAL NIGHTLY
COMMUNITY
Start: 2021-07-26 | End: 2021-10-12 | Stop reason: HOSPADM

## 2021-10-10 RX ADMIN — NITROGLYCERIN 0.4 MG: 0.4 TABLET, ORALLY DISINTEGRATING SUBLINGUAL at 19:17

## 2021-10-10 RX ADMIN — FAMOTIDINE 20 MG: 10 INJECTION INTRAVENOUS at 19:16

## 2021-10-10 RX ADMIN — ASPIRIN 81 MG 324 MG: 81 TABLET ORAL at 18:43

## 2021-10-10 NOTE — ED PROVIDER NOTES
Time: 7:03 PM EDT  Arrived by: private car  Chief Complaint: Chest pain  History provided by: Patient  History is limited by: N/A      History of Present Illness:  Patient is a 71 y.o. year old male that presents to the emergency department with substernal chest tightness that started 1.5 hours ago.  Patient states his discomfort started at rest, has no aggravating or alleviating factors.  He has mild shortness of breath.  Denies nausea vomiting.  Pain is not radiating.    HPI    Similar Symptoms Previously: Yes  Recently seen: Yes, patient states extensive recent outpatient work-up for his atrial fibrillation.  States the rate has been fluctuating from severe bradycardia to tachycardia so had a pacemaker implanted approximately 3 weeks ago.      Patient Care Team  Primary Care Provider: Ciro Bland MD    Past Medical History:     No Known Allergies  Past Medical History:   Diagnosis Date   • Allergic    • Anxiety    • Arrhythmia    • Arthritis    • Atrial fibrillation (HCC)    • Bladder disorder    • Cataract    • Coronary artery disease    • HBP (high blood pressure)    • Heart disease    • High cholesterol    • HL (hearing loss)    • Hyperlipidemia    • Hypertension    • Hypothyroidism    • Low back pain    • Peptic ulceration    • Rectal bleeding    • Sleep apnea     USES CPAP   • Ulcer (traumatic) of oral mucosa    • Visual impairment      Past Surgical History:   Procedure Laterality Date   • CARDIAC ELECTROPHYSIOLOGY PROCEDURE N/A 9/20/2021    Procedure: Pacemaker SC new/SJM;  Surgeon: Clarence Ramírez MD;  Location: [facility] INVASIVE LOCATION;  Service: Cardiology;  Laterality: N/A;   • CATARACT EXTRACTION WITH INTRAOCULAR LENS IMPLANT Bilateral    • COLONOSCOPY     • ENDOSCOPY     • EYE SURGERY  11/1/2020   • SHOULDER SURGERY Right     REPLACEMENT   • SHOULDER SURGERY Left      Family History   Problem Relation Age of Onset   • Breast cancer Maternal Grandmother 50   • Colon cancer Maternal  Grandfather 70   • Alcohol abuse Maternal Grandfather    • Cancer Maternal Grandfather    • Heart disease Father    • Hypertension Father        Home Medications:  Prior to Admission medications    Medication Sig Start Date End Date Taking? Authorizing Provider   citalopram (CeleXA) 10 MG tablet Take 1 tablet by mouth Daily. 10/11/19   Cathy Day MD   finasteride (PROSCAR) 5 MG tablet TAKE 1 TABLET BY MOUTH EVERY DAY  Patient taking differently: Take 5 mg by mouth Daily. 8/11/21   Binu Banks Jr., MD   Flaxseed, Linseed, (FLAX SEED OIL) 1300 MG capsule Take  by mouth.    Cathy Day MD   irbesartan (AVAPRO) 300 MG tablet Take 1 tablet by mouth Daily. 7/27/21   Danielle Scott MD   levothyroxine (SYNTHROID, LEVOTHROID) 25 MCG tablet TAKE 1.5 TABLETS BY MOUTH EVERY DAY  Patient taking differently: Take  by mouth Daily. 8/25/21   Ciro Bland MD   metoprolol succinate XL (TOPROL-XL) 25 MG 24 hr tablet Take 1 tablet by mouth Daily. 10/7/21   Clarence Ramírez MD   Multiple Vitamin (MULTI-VITAMIN DAILY PO) Take  by mouth.    Cathy Day MD   pravastatin (PRAVACHOL) 40 MG tablet Take 1 tablet by mouth Daily. 7/27/21   Danielle Scott MD   sulfaSALAzine (AZULFIDINE) 500 MG tablet Take 500 mg by mouth Daily. 10/31/17   Cathy Day MD   vitamin B-12 (CYANOCOBALAMIN) 1000 MCG tablet Take 1,000 mcg by mouth Daily.    Cathy Day MD   warfarin (COUMADIN) 5 MG tablet TAKE 1 TABLET BY MOUTH EVERY NIGHT EXCEPT 1.5 TABLETS ON Monday.  Patient taking differently: Take 5 mg by mouth Every Night. TAKE 1 TABLET BY MOUTH EVERY NIGHT EXCEPT 1.5 TABLETS ON Monday. 7/27/21   Danielle Scott MD        Social History:   Social History     Tobacco Use   • Smoking status: Never Smoker   • Smokeless tobacco: Never Used   Vaping Use   • Vaping Use: Never used   Substance Use Topics   • Alcohol use: Yes     Alcohol/week: 0.0 standard drinks     Comment: 5-6 beers a week,  "couple shots of bourbon a week   • Drug use: No     Recent travel: no     Review of Systems:  Review of Systems   Constitutional: Negative for chills and fever.   HENT: Negative for congestion, rhinorrhea and sore throat.    Eyes: Negative for pain and visual disturbance.   Respiratory: Positive for chest tightness and shortness of breath. Negative for apnea and cough.    Cardiovascular: Positive for chest pain. Negative for palpitations.   Gastrointestinal: Negative for abdominal pain, diarrhea, nausea and vomiting.   Genitourinary: Negative for difficulty urinating and dysuria.   Musculoskeletal: Negative for joint swelling and myalgias.   Skin: Negative for color change.   Neurological: Negative for seizures and headaches.   Psychiatric/Behavioral: Negative.    All other systems reviewed and are negative.       Physical Exam:  BP (!) 132/106 (BP Location: Left arm, Patient Position: Sitting) Comment: rn aware  Pulse 73   Temp 97.3 °F (36.3 °C) (Oral)   Resp 18   Ht 182.9 cm (72\")   Wt 83 kg (182 lb 15.7 oz)   SpO2 99%   BMI 24.82 kg/m²     Physical Exam  Vitals and nursing note reviewed.   Constitutional:       Appearance: Normal appearance.   HENT:      Head: Normocephalic and atraumatic.      Nose: Nose normal.      Mouth/Throat:      Mouth: Mucous membranes are dry.   Eyes:      Extraocular Movements: Extraocular movements intact.      Pupils: Pupils are equal, round, and reactive to light.   Cardiovascular:      Rate and Rhythm: Normal rate and regular rhythm.      Heart sounds: Normal heart sounds.   Pulmonary:      Effort: Pulmonary effort is normal.      Breath sounds: Normal breath sounds.   Abdominal:      General: Bowel sounds are normal.      Palpations: Abdomen is soft.      Tenderness: There is no abdominal tenderness.   Musculoskeletal:         General: No swelling. Normal range of motion.      Cervical back: Normal range of motion and neck supple.      Right lower leg: No edema.      Left " lower leg: No edema.   Skin:     General: Skin is warm and dry.      Coloration: Skin is not jaundiced.   Neurological:      General: No focal deficit present.      Mental Status: He is alert and oriented to person, place, and time. Mental status is at baseline.   Psychiatric:         Mood and Affect: Mood normal.         Behavior: Behavior normal.         Judgment: Judgment normal.                Medications in the Emergency Department:  Medications   sodium chloride 0.9 % flush 10 mL (has no administration in time range)   nitroglycerin (NITROSTAT) SL tablet 0.4 mg (0.4 mg Sublingual Given 10/10/21 1917)   citalopram (CeleXA) tablet 10 mg (has no administration in time range)   finasteride (PROSCAR) tablet 5 mg (has no administration in time range)   levothyroxine (SYNTHROID, LEVOTHROID) tablet 37.5 mcg (has no administration in time range)   sodium chloride 0.9 % flush 10 mL (10 mL Intravenous Given 10/11/21 0119)   sodium chloride 0.9 % flush 10 mL (has no administration in time range)   aspirin EC tablet 81 mg (has no administration in time range)   acetaminophen (TYLENOL) tablet 650 mg (has no administration in time range)   aluminum-magnesium hydroxide-simethicone (MAALOX MAX) 400-400-40 MG/5ML suspension 15 mL (has no administration in time range)   sennosides-docusate (PERICOLACE) 8.6-50 MG per tablet 2 tablet (2 tablets Oral Not Given 10/11/21 0121)     And   polyethylene glycol (MIRALAX) packet 17 g (has no administration in time range)     And   bisacodyl (DULCOLAX) EC tablet 5 mg (has no administration in time range)     And   bisacodyl (DULCOLAX) suppository 10 mg (has no administration in time range)   Pharmacy to dose warfarin (has no administration in time range)   ondansetron (ZOFRAN) injection 4 mg (has no administration in time range)   metoprolol succinate XL (TOPROL-XL) 24 hr tablet 25 mg (25 mg Oral Given 10/11/21 0119)   pravastatin (PRAVACHOL) tablet 40 mg (40 mg Oral Given 10/11/21 0119)    melatonin tablet 10 mg (10 mg Oral Given 10/11/21 0134)   aspirin chewable tablet 324 mg (324 mg Oral Given 10/10/21 1843)   famotidine (PEPCID) injection 20 mg (20 mg Intravenous Given 10/10/21 1916)        Labs  Lab Results (last 24 hours)     Procedure Component Value Units Date/Time    POC Troponin I with Hold Tube [[id number]] Collected: 10/10/21 1828    Specimen: Blood Updated: 10/10/21 1844    Narrative:      The following orders were created for panel order POC Troponin I with Hold Tube.  Procedure                               Abnormality         Status                     ---------                               -----------         ------                     POC Troponin I[[id number]]                                                              HOLD Troponin-I Tube[[id number]]                             Final result                 Please view results for these tests on the individual orders.    CBC & Differential [[id number]]  (Abnormal) Collected: 10/10/21 1828    Specimen: Blood Updated: 10/10/21 1837    Narrative:      The following orders were created for panel order CBC & Differential.  Procedure                               Abnormality         Status                     ---------                               -----------         ------                     CBC Auto Differential[[id number]]        Abnormal            Final result                 Please view results for these tests on the individual orders.    Comprehensive Metabolic Panel [[id number]]  (Abnormal) Collected: 10/10/21 1828    Specimen: Blood Updated: 10/10/21 1906     Glucose 97 mg/dL      BUN 14 mg/dL      Creatinine 1.09 mg/dL      Sodium 139 mmol/L      Potassium 3.7 mmol/L      Chloride 105 mmol/L      CO2 22.3 mmol/L      Calcium 8.6 mg/dL      Total Protein 6.5 g/dL      Albumin 4.30 g/dL      ALT (SGPT) 26 U/L      AST (SGOT) 28 U/L      Alkaline Phosphatase 38 U/L      Total Bilirubin 0.6 mg/dL      eGFR Non  Amer 66  mL/min/1.73      Globulin 2.2 gm/dL      A/G Ratio 2.0 g/dL      BUN/Creatinine Ratio 12.8     Anion Gap 11.7 mmol/L     Narrative:      GFR Normal >60  Chronic Kidney Disease <60  Kidney Failure <15      Lipase [[id number]]  (Normal) Collected: 10/10/21 1828    Specimen: Blood Updated: 10/10/21 1906     Lipase 25 U/L     BNP [[id number]]  (Abnormal) Collected: 10/10/21 1828    Specimen: Blood Updated: 10/10/21 1903     proBNP 3,329.0 pg/mL     Narrative:      Among patients with dyspnea, NT-proBNP is highly sensitive for the detection of acute congestive heart failure. In addition NT-proBNP of <300 pg/ml effectively rules out acute congestive heart failure with 99% negative predictive value.    Results may be falsely decreased if patient taking Biotin.      Magnesium [[id number]]  (Normal) Collected: 10/10/21 1828    Specimen: Blood Updated: 10/10/21 1906     Magnesium 1.9 mg/dL     CK Total & CKMB [[id number]]  (Abnormal) Collected: 10/10/21 1828    Specimen: Blood Updated: 10/10/21 1906     CKMB 5.48 ng/mL      Creatine Kinase 207 U/L     Narrative:      CKMB results may be falsely decreased if patient taking Biotin.    CBC Auto Differential [[id number]]  (Abnormal) Collected: 10/10/21 1828    Specimen: Blood Updated: 10/10/21 1837     WBC 6.82 10*3/mm3      RBC 3.79 10*6/mm3      Hemoglobin 12.5 g/dL      Hematocrit 37.7 %      MCV 99.5 fL      MCH 33.0 pg      MCHC 33.2 g/dL      RDW 12.9 %      RDW-SD 46.4 fl      MPV 9.0 fL      Platelets 215 10*3/mm3      Neutrophil % 57.4 %      Lymphocyte % 30.2 %      Monocyte % 10.4 %      Eosinophil % 1.5 %      Basophil % 0.4 %      Immature Grans % 0.1 %      Neutrophils, Absolute 3.91 10*3/mm3      Lymphocytes, Absolute 2.06 10*3/mm3      Monocytes, Absolute 0.71 10*3/mm3      Eosinophils, Absolute 0.10 10*3/mm3      Basophils, Absolute 0.03 10*3/mm3      Immature Grans, Absolute 0.01 10*3/mm3      nRBC 0.0 /100 WBC     POC Troponin I [[id number]]  (Normal) Collected:  10/10/21 1828    Specimen: Blood Updated: 10/10/21 1840     Troponin I 0.08 ng/mL      Comment: Serial Number: [id number]:  [id number]       Protime-INR [[id number]]  (Abnormal) Collected: 10/10/21 1828    Specimen: Blood Updated: 10/10/21 1913     Protime 38.1 Seconds      INR 3.80    Narrative:      Suggested Therapeutic Ranges For Oral Anticoagulant Therapy:  Level of Therapy                      INR Target Range  Standard Dose                            2.0-3.0  High Dose                                2.5-3.5  Patients not receiving anticoagulant  Therapy Normal Range                     0.6-1.2    POC Troponin I with Hold Tube [[id number]] Collected: 10/10/21 2033    Specimen: Blood Updated: 10/10/21 2309    Narrative:      The following orders were created for panel order POC Troponin I with Hold Tube.  Procedure                               Abnormality         Status                     ---------                               -----------         ------                     POC Troponin I[[id number]]                                                              HOLD Troponin-I Tube[[id number]]                             Final result                 Please view results for these tests on the individual orders.    POC Troponin I [[id number]]  (Normal) Collected: 10/10/21 2036    Specimen: Blood Updated: 10/10/21 2049     Troponin I 0.11 ng/mL      Comment: Serial Number: [id number]:  [id number]       Troponin [[id number]] Collected: 10/11/21 0211    Specimen: Blood Updated: 10/11/21 0304           Imaging:  XR Chest 1 View    Result Date: 10/10/2021  PROCEDURE: XR CHEST 1 VW  COMPARISON: [facility], , CHEST AP/PA 1 VIEW, 9/29/2015, 21:06.  INDICATIONS: CHEST TIGHTNESS  FINDINGS:  Left-sided AICD noted.  Heart size within normal limits for technique.  No focal consolidation, pneumothorax, or pleural effusion.  Right shoulder prosthesis noted.  Aortic arch atherosclerosis.  CONCLUSION: No acute process.        MAGALIE ARIAS MD       Electronically Signed and Approved By: MAGALIE ARIAS MD on 10/10/2021 at 19:11               Procedures:  Procedures    Progress  ED Course as of 10/11/21 0310   Sun Oct 10, 2021   1847 EKG interpretation: Irregularly irregular rhythm consistent with atrial fibrillation, heart rate 97, normal QRS, normal axis, nonspecific ST segment changes with no definite acute ischemia. [RP]   2158 Case discussed with Timur for admission.  And also discussed with Dr. Medellin, who both feel I should discuss this case with cardiology prior to admission to ensure patient should not be transferred to [facility] where he had his recent pacemaker placed.  Patient does not want to be transferred.  Call placed to cardiology. [RP]   2204 Troponin I: 0.11 [RP]   2218 Case discussed with Dr. Tolbert who feels patient can absolutely stay here.  He is happy to consult and take care of this patient.  Recommends no anticoagulation in light of his supratherapeutic INR [RP]      ED Course User Index  [RP] Marquise Carbajal MD           HEART Score: 7                  Medical Decision Making:  MDM  Number of Diagnoses or Management Options     Amount and/or Complexity of Data Reviewed  Clinical lab tests: reviewed  Tests in the radiology section of CPT®: reviewed  Tests in the medicine section of CPT®: reviewed  Review and summarize past medical records: yes  Independent visualization of images, tracings, or specimens: yes    Risk of Complications, Morbidity, and/or Mortality  Presenting problems: moderate  Management options: low    Patient Progress  Patient progress: stable     Victor Manuel Mendoza  Echo Complete w/Doppler and Color Flow  Order# [id number]  Reading physician: Danielle Scott MD Ordering physician: Danielle Scott MD Study date: 21       Patient Information    Patient Name   Victor Manuel Mendoza MRN   [id number] Legal Sex   Male  (Age)   1950 (71 y.o.)     PACS Images     Show images for Adult  Transthoracic Echo Complete W/ Cont if Necessary Per Protocol   Sedation Narrator Report    Sedation Narrator Report        Interpretation Summary    · Calculated left ventricular EF = 59.6% Estimated left ventricular EF was in agreement with the calculated left ventricular EF. Left ventricular systolic function is normal.  · The right ventricular cavity is borderline dilated.  · Mildly reduced right ventricular systolic function noted.  · Left ventricular diastolic function was indeterminate.  · Left atrial volume is severely increased.  · The right atrial cavity is moderate to severely dilated.  · There is mild, bileaflet mitral valve thickening present.  · Mild to moderate mitral valve regurgitation is present.  · Mild tricuspid valve regurgitation is present.  · Estimated right ventricular systolic pressure from tricuspid regurgitation is mildly elevated (35-45 mmHg).  · Mild dilation of the aortic root is present.          Final diagnoses:   Chest tightness        Disposition:  ED Disposition     ED Disposition Condition Comment    Decision to Admit  Level of Care: Telemetry [5]   Diagnosis: Chest pain [137135]   Admitting Physician: JOSÉ MIGUEL PURI [[id number]]   Attending Physician: JOSÉ MIGUEL PURI [[id number]]   Bed Request Comments: PCU if available            This medical record created using voice recognition software and may contain unintended errors.         Marquise Carbajal MD  10/11/21 3685

## 2021-10-11 PROBLEM — R79.1 SUPRATHERAPEUTIC INR: Status: ACTIVE | Noted: 2021-10-11

## 2021-10-11 PROBLEM — Z95.5 S/P CORONARY ARTERY STENT PLACEMENT: Status: ACTIVE | Noted: 2021-10-11

## 2021-10-11 PROBLEM — I21.4 NSTEMI (NON-ST ELEVATED MYOCARDIAL INFARCTION): Status: ACTIVE | Noted: 2021-10-11

## 2021-10-11 LAB
ACT BLD: 224 SECONDS (ref 89–137)
ACT BLD: 224 SECONDS (ref 89–137)
ACT BLD: 268 SECONDS (ref 89–137)
ANION GAP SERPL CALCULATED.3IONS-SCNC: 9.1 MMOL/L (ref 5–15)
BUN SERPL-MCNC: 16 MG/DL (ref 8–23)
BUN/CREAT SERPL: 15.4 (ref 7–25)
CALCIUM SPEC-SCNC: 8.2 MG/DL (ref 8.6–10.5)
CHLORIDE SERPL-SCNC: 106 MMOL/L (ref 98–107)
CHOLEST SERPL-MCNC: 128 MG/DL (ref 0–200)
CK MB SERPL-CCNC: 4.21 NG/ML
CK SERPL-CCNC: 133 U/L (ref 20–200)
CO2 SERPL-SCNC: 22.9 MMOL/L (ref 22–29)
CREAT SERPL-MCNC: 1.04 MG/DL (ref 0.76–1.27)
DEPRECATED RDW RBC AUTO: 46.5 FL (ref 37–54)
ERYTHROCYTE [DISTWIDTH] IN BLOOD BY AUTOMATED COUNT: 12.9 % (ref 12.3–15.4)
GFR SERPL CREATININE-BSD FRML MDRD: 70 ML/MIN/1.73
GLUCOSE SERPL-MCNC: 91 MG/DL (ref 65–99)
HBA1C MFR BLD: 5 % (ref 4.8–5.6)
HCT VFR BLD AUTO: 34.8 % (ref 37.5–51)
HDLC SERPL-MCNC: 38 MG/DL (ref 40–60)
HGB BLD-MCNC: 11.8 G/DL (ref 13–17.7)
INR PPP: 1.95 (ref 2–3)
INR PPP: 3.05 (ref 2–3)
LDLC SERPL CALC-MCNC: 70 MG/DL (ref 0–100)
LDLC/HDLC SERPL: 1.82 {RATIO}
MAGNESIUM SERPL-MCNC: 1.9 MG/DL (ref 1.6–2.4)
MCH RBC QN AUTO: 33.3 PG (ref 26.6–33)
MCHC RBC AUTO-ENTMCNC: 33.9 G/DL (ref 31.5–35.7)
MCV RBC AUTO: 98.3 FL (ref 79–97)
PLATELET # BLD AUTO: 193 10*3/MM3 (ref 140–450)
PMV BLD AUTO: 9.4 FL (ref 6–12)
POTASSIUM SERPL-SCNC: 3.9 MMOL/L (ref 3.5–5.2)
PROTHROMBIN TIME: 20 SECONDS (ref 9.4–12)
PROTHROMBIN TIME: 30.9 SECONDS (ref 9.4–12)
RBC # BLD AUTO: 3.54 10*6/MM3 (ref 4.14–5.8)
SODIUM SERPL-SCNC: 138 MMOL/L (ref 136–145)
TRIGL SERPL-MCNC: 105 MG/DL (ref 0–150)
TROPONIN T SERPL-MCNC: 0.12 NG/ML (ref 0–0.03)
TROPONIN T SERPL-MCNC: 0.12 NG/ML (ref 0–0.03)
VLDLC SERPL-MCNC: 20 MG/DL (ref 5–40)
WBC # BLD AUTO: 4.85 10*3/MM3 (ref 3.4–10.8)

## 2021-10-11 PROCEDURE — 82550 ASSAY OF CK (CPK): CPT | Performed by: PHYSICIAN ASSISTANT

## 2021-10-11 PROCEDURE — C1769 GUIDE WIRE: HCPCS | Performed by: INTERNAL MEDICINE

## 2021-10-11 PROCEDURE — 85027 COMPLETE CBC AUTOMATED: CPT | Performed by: PHYSICIAN ASSISTANT

## 2021-10-11 PROCEDURE — 80061 LIPID PANEL: CPT | Performed by: PHYSICIAN ASSISTANT

## 2021-10-11 PROCEDURE — 25010000003 PHYTONADIONE 10 MG/ML SOLUTION 1 ML AMPULE: Performed by: INTERNAL MEDICINE

## 2021-10-11 PROCEDURE — 25010000003 MEPERIDINE PER 100 MG: Performed by: INTERNAL MEDICINE

## 2021-10-11 PROCEDURE — 25010000002 PROTAMINE SULFATE PER 10 MG: Performed by: INTERNAL MEDICINE

## 2021-10-11 PROCEDURE — 83036 HEMOGLOBIN GLYCOSYLATED A1C: CPT | Performed by: PHYSICIAN ASSISTANT

## 2021-10-11 PROCEDURE — C1894 INTRO/SHEATH, NON-LASER: HCPCS | Performed by: INTERNAL MEDICINE

## 2021-10-11 PROCEDURE — 83735 ASSAY OF MAGNESIUM: CPT | Performed by: PHYSICIAN ASSISTANT

## 2021-10-11 PROCEDURE — 93458 L HRT ARTERY/VENTRICLE ANGIO: CPT | Performed by: INTERNAL MEDICINE

## 2021-10-11 PROCEDURE — 80048 BASIC METABOLIC PNL TOTAL CA: CPT | Performed by: PHYSICIAN ASSISTANT

## 2021-10-11 PROCEDURE — 94799 UNLISTED PULMONARY SVC/PX: CPT

## 2021-10-11 PROCEDURE — C1874 STENT, COATED/COV W/DEL SYS: HCPCS | Performed by: INTERNAL MEDICINE

## 2021-10-11 PROCEDURE — C1725 CATH, TRANSLUMIN NON-LASER: HCPCS | Performed by: INTERNAL MEDICINE

## 2021-10-11 PROCEDURE — 82553 CREATINE MB FRACTION: CPT | Performed by: PHYSICIAN ASSISTANT

## 2021-10-11 PROCEDURE — 99204 OFFICE O/P NEW MOD 45 MIN: CPT | Performed by: INTERNAL MEDICINE

## 2021-10-11 PROCEDURE — 99152 MOD SED SAME PHYS/QHP 5/>YRS: CPT | Performed by: INTERNAL MEDICINE

## 2021-10-11 PROCEDURE — 93010 ELECTROCARDIOGRAM REPORT: CPT | Performed by: INTERNAL MEDICINE

## 2021-10-11 PROCEDURE — 85610 PROTHROMBIN TIME: CPT | Performed by: PHYSICIAN ASSISTANT

## 2021-10-11 PROCEDURE — A9270 NON-COVERED ITEM OR SERVICE: HCPCS | Performed by: INTERNAL MEDICINE

## 2021-10-11 PROCEDURE — G0378 HOSPITAL OBSERVATION PER HR: HCPCS

## 2021-10-11 PROCEDURE — 93005 ELECTROCARDIOGRAM TRACING: CPT | Performed by: INTERNAL MEDICINE

## 2021-10-11 PROCEDURE — C9600 PERC DRUG-EL COR STENT SING: HCPCS | Performed by: INTERNAL MEDICINE

## 2021-10-11 PROCEDURE — 63710000001 CLOPIDOGREL 75 MG TABLET: Performed by: INTERNAL MEDICINE

## 2021-10-11 PROCEDURE — 99225 PR SBSQ OBSERVATION CARE/DAY 25 MINUTES: CPT | Performed by: INTERNAL MEDICINE

## 2021-10-11 PROCEDURE — 92928 PRQ TCAT PLMT NTRAC ST 1 LES: CPT | Performed by: INTERNAL MEDICINE

## 2021-10-11 PROCEDURE — 25010000002 HEPARIN (PORCINE) PER 1000 UNITS: Performed by: INTERNAL MEDICINE

## 2021-10-11 PROCEDURE — 85610 PROTHROMBIN TIME: CPT | Performed by: INTERNAL MEDICINE

## 2021-10-11 PROCEDURE — 84484 ASSAY OF TROPONIN QUANT: CPT | Performed by: PHYSICIAN ASSISTANT

## 2021-10-11 PROCEDURE — 92921: CPT | Performed by: INTERNAL MEDICINE

## 2021-10-11 PROCEDURE — 99153 MOD SED SAME PHYS/QHP EA: CPT | Performed by: INTERNAL MEDICINE

## 2021-10-11 PROCEDURE — 93005 ELECTROCARDIOGRAM TRACING: CPT | Performed by: PHYSICIAN ASSISTANT

## 2021-10-11 PROCEDURE — 0 IOPAMIDOL PER 1 ML: Performed by: INTERNAL MEDICINE

## 2021-10-11 PROCEDURE — C1887 CATHETER, GUIDING: HCPCS | Performed by: INTERNAL MEDICINE

## 2021-10-11 PROCEDURE — 25010000002 MIDAZOLAM PER 1MG: Performed by: INTERNAL MEDICINE

## 2021-10-11 PROCEDURE — 85347 COAGULATION TIME ACTIVATED: CPT

## 2021-10-11 PROCEDURE — 63710000001 MELATONIN 5 MG TABLET: Performed by: INTERNAL MEDICINE

## 2021-10-11 PROCEDURE — 25010000002 FENTANYL CITRATE (PF) 100 MCG/2ML SOLUTION: Performed by: INTERNAL MEDICINE

## 2021-10-11 PROCEDURE — 96365 THER/PROPH/DIAG IV INF INIT: CPT

## 2021-10-11 DEVICE — XIENCE SIERRA™ EVEROLIMUS ELUTING CORONARY STENT SYSTEM 2.25 MM X 18 MM / RAPID-EXCHANGE
Type: IMPLANTABLE DEVICE | Status: FUNCTIONAL
Brand: XIENCE SIERRA™

## 2021-10-11 DEVICE — XIENCE SIERRA™ EVEROLIMUS ELUTING CORONARY STENT SYSTEM 2.75 MM X 15 MM / RAPID-EXCHANGE
Type: IMPLANTABLE DEVICE | Status: FUNCTIONAL
Brand: XIENCE SIERRA™

## 2021-10-11 DEVICE — XIENCE SIERRA™ EVEROLIMUS ELUTING CORONARY STENT SYSTEM 2.25 MM X 12 MM / RAPID-EXCHANGE
Type: IMPLANTABLE DEVICE | Status: FUNCTIONAL
Brand: XIENCE SIERRA™

## 2021-10-11 RX ORDER — SODIUM CHLORIDE 9 MG/ML
INJECTION, SOLUTION INTRAVENOUS CONTINUOUS PRN
Status: COMPLETED | OUTPATIENT
Start: 2021-10-11 | End: 2021-10-11

## 2021-10-11 RX ORDER — SODIUM CHLORIDE 0.9 % (FLUSH) 0.9 %
10 SYRINGE (ML) INJECTION EVERY 12 HOURS SCHEDULED
Status: DISCONTINUED | OUTPATIENT
Start: 2021-10-11 | End: 2021-10-12 | Stop reason: HOSPADM

## 2021-10-11 RX ORDER — ASPIRIN 81 MG/1
81 TABLET ORAL DAILY
Status: DISCONTINUED | OUTPATIENT
Start: 2021-10-11 | End: 2021-10-12 | Stop reason: HOSPADM

## 2021-10-11 RX ORDER — LEVOTHYROXINE SODIUM 0.07 MG/1
37.5 TABLET ORAL DAILY
Status: DISCONTINUED | OUTPATIENT
Start: 2021-10-11 | End: 2021-10-12 | Stop reason: HOSPADM

## 2021-10-11 RX ORDER — ONDANSETRON 4 MG/1
4 TABLET, FILM COATED ORAL EVERY 6 HOURS PRN
Status: DISCONTINUED | OUTPATIENT
Start: 2021-10-11 | End: 2021-10-12 | Stop reason: HOSPADM

## 2021-10-11 RX ORDER — ACETAMINOPHEN 325 MG/1
650 TABLET ORAL EVERY 4 HOURS PRN
Status: DISCONTINUED | OUTPATIENT
Start: 2021-10-11 | End: 2021-10-12 | Stop reason: HOSPADM

## 2021-10-11 RX ORDER — CITALOPRAM 20 MG/1
10 TABLET ORAL DAILY
Status: DISCONTINUED | OUTPATIENT
Start: 2021-10-11 | End: 2021-10-12 | Stop reason: HOSPADM

## 2021-10-11 RX ORDER — PRAVASTATIN SODIUM 40 MG
40 TABLET ORAL DAILY
Status: DISCONTINUED | OUTPATIENT
Start: 2021-10-11 | End: 2021-10-11

## 2021-10-11 RX ORDER — ONDANSETRON 2 MG/ML
4 INJECTION INTRAMUSCULAR; INTRAVENOUS EVERY 6 HOURS PRN
Status: DISCONTINUED | OUTPATIENT
Start: 2021-10-11 | End: 2021-10-12 | Stop reason: HOSPADM

## 2021-10-11 RX ORDER — AMOXICILLIN 250 MG
2 CAPSULE ORAL 2 TIMES DAILY
Status: DISCONTINUED | OUTPATIENT
Start: 2021-10-11 | End: 2021-10-12 | Stop reason: HOSPADM

## 2021-10-11 RX ORDER — ONDANSETRON 2 MG/ML
4 INJECTION INTRAMUSCULAR; INTRAVENOUS EVERY 4 HOURS PRN
Status: DISCONTINUED | OUTPATIENT
Start: 2021-10-11 | End: 2021-10-12 | Stop reason: HOSPADM

## 2021-10-11 RX ORDER — ALUMINA, MAGNESIA, AND SIMETHICONE 2400; 2400; 240 MG/30ML; MG/30ML; MG/30ML
15 SUSPENSION ORAL EVERY 6 HOURS PRN
Status: DISCONTINUED | OUTPATIENT
Start: 2021-10-11 | End: 2021-10-12 | Stop reason: HOSPADM

## 2021-10-11 RX ORDER — PROTAMINE SULFATE 10 MG/ML
25 INJECTION, SOLUTION INTRAVENOUS ONCE
Status: COMPLETED | OUTPATIENT
Start: 2021-10-11 | End: 2021-10-11

## 2021-10-11 RX ORDER — NITROGLYCERIN 5 MG/ML
INJECTION, SOLUTION INTRAVENOUS AS NEEDED
Status: DISCONTINUED | OUTPATIENT
Start: 2021-10-11 | End: 2021-10-11 | Stop reason: HOSPADM

## 2021-10-11 RX ORDER — NITROGLYCERIN 0.4 MG/1
0.4 TABLET SUBLINGUAL
Status: DISCONTINUED | OUTPATIENT
Start: 2021-10-11 | End: 2021-10-11 | Stop reason: SDUPTHER

## 2021-10-11 RX ORDER — METOPROLOL SUCCINATE 25 MG/1
25 TABLET, EXTENDED RELEASE ORAL DAILY
Status: DISCONTINUED | OUTPATIENT
Start: 2021-10-11 | End: 2021-10-11

## 2021-10-11 RX ORDER — MEPERIDINE HYDROCHLORIDE 25 MG/ML
INJECTION INTRAMUSCULAR; INTRAVENOUS; SUBCUTANEOUS AS NEEDED
Status: DISCONTINUED | OUTPATIENT
Start: 2021-10-11 | End: 2021-10-11 | Stop reason: HOSPADM

## 2021-10-11 RX ORDER — NALOXONE HCL 0.4 MG/ML
0.4 VIAL (ML) INJECTION
Status: DISCONTINUED | OUTPATIENT
Start: 2021-10-11 | End: 2021-10-12 | Stop reason: HOSPADM

## 2021-10-11 RX ORDER — LIDOCAINE HYDROCHLORIDE 20 MG/ML
INJECTION, SOLUTION INFILTRATION; PERINEURAL AS NEEDED
Status: DISCONTINUED | OUTPATIENT
Start: 2021-10-11 | End: 2021-10-11 | Stop reason: HOSPADM

## 2021-10-11 RX ORDER — SODIUM CHLORIDE 0.9 % (FLUSH) 0.9 %
10 SYRINGE (ML) INJECTION AS NEEDED
Status: DISCONTINUED | OUTPATIENT
Start: 2021-10-11 | End: 2021-10-12 | Stop reason: HOSPADM

## 2021-10-11 RX ORDER — SODIUM CHLORIDE 9 MG/ML
150 INJECTION, SOLUTION INTRAVENOUS CONTINUOUS
Status: ACTIVE | OUTPATIENT
Start: 2021-10-11 | End: 2021-10-11

## 2021-10-11 RX ORDER — CLOPIDOGREL BISULFATE 75 MG/1
75 TABLET ORAL DAILY
Status: DISCONTINUED | OUTPATIENT
Start: 2021-10-12 | End: 2021-10-12 | Stop reason: HOSPADM

## 2021-10-11 RX ORDER — FENTANYL CITRATE 50 UG/ML
INJECTION, SOLUTION INTRAMUSCULAR; INTRAVENOUS AS NEEDED
Status: DISCONTINUED | OUTPATIENT
Start: 2021-10-11 | End: 2021-10-11 | Stop reason: HOSPADM

## 2021-10-11 RX ORDER — MORPHINE SULFATE 2 MG/ML
1 INJECTION, SOLUTION INTRAMUSCULAR; INTRAVENOUS EVERY 4 HOURS PRN
Status: ACTIVE | OUTPATIENT
Start: 2021-10-11 | End: 2021-10-12

## 2021-10-11 RX ORDER — VERAPAMIL HYDROCHLORIDE 2.5 MG/ML
INJECTION, SOLUTION INTRAVENOUS AS NEEDED
Status: DISCONTINUED | OUTPATIENT
Start: 2021-10-11 | End: 2021-10-11 | Stop reason: HOSPADM

## 2021-10-11 RX ORDER — FINASTERIDE 5 MG/1
5 TABLET, FILM COATED ORAL DAILY
Status: DISCONTINUED | OUTPATIENT
Start: 2021-10-11 | End: 2021-10-12 | Stop reason: HOSPADM

## 2021-10-11 RX ORDER — CLOPIDOGREL BISULFATE 75 MG/1
TABLET ORAL AS NEEDED
Status: DISCONTINUED | OUTPATIENT
Start: 2021-10-11 | End: 2021-10-11 | Stop reason: HOSPADM

## 2021-10-11 RX ORDER — BISACODYL 10 MG
10 SUPPOSITORY, RECTAL RECTAL DAILY PRN
Status: DISCONTINUED | OUTPATIENT
Start: 2021-10-11 | End: 2021-10-12 | Stop reason: HOSPADM

## 2021-10-11 RX ORDER — PRAVASTATIN SODIUM 40 MG
40 TABLET ORAL DAILY
Status: DISCONTINUED | OUTPATIENT
Start: 2021-10-11 | End: 2021-10-12 | Stop reason: HOSPADM

## 2021-10-11 RX ORDER — MIDAZOLAM HYDROCHLORIDE 2 MG/2ML
INJECTION, SOLUTION INTRAMUSCULAR; INTRAVENOUS AS NEEDED
Status: DISCONTINUED | OUTPATIENT
Start: 2021-10-11 | End: 2021-10-11 | Stop reason: HOSPADM

## 2021-10-11 RX ORDER — CHOLECALCIFEROL (VITAMIN D3) 125 MCG
10 CAPSULE ORAL NIGHTLY PRN
Status: DISCONTINUED | OUTPATIENT
Start: 2021-10-11 | End: 2021-10-12 | Stop reason: HOSPADM

## 2021-10-11 RX ORDER — BISACODYL 5 MG/1
5 TABLET, DELAYED RELEASE ORAL DAILY PRN
Status: DISCONTINUED | OUTPATIENT
Start: 2021-10-11 | End: 2021-10-12 | Stop reason: HOSPADM

## 2021-10-11 RX ORDER — METOPROLOL SUCCINATE 25 MG/1
25 TABLET, EXTENDED RELEASE ORAL DAILY
Status: DISCONTINUED | OUTPATIENT
Start: 2021-10-11 | End: 2021-10-12 | Stop reason: HOSPADM

## 2021-10-11 RX ORDER — POLYETHYLENE GLYCOL 3350 17 G/17G
17 POWDER, FOR SOLUTION ORAL DAILY PRN
Status: DISCONTINUED | OUTPATIENT
Start: 2021-10-11 | End: 2021-10-12 | Stop reason: HOSPADM

## 2021-10-11 RX ORDER — HEPARIN SODIUM 1000 [USP'U]/ML
INJECTION, SOLUTION INTRAVENOUS; SUBCUTANEOUS AS NEEDED
Status: DISCONTINUED | OUTPATIENT
Start: 2021-10-11 | End: 2021-10-11 | Stop reason: HOSPADM

## 2021-10-11 RX ADMIN — FINASTERIDE 5 MG: 5 TABLET, FILM COATED ORAL at 09:18

## 2021-10-11 RX ADMIN — ASPIRIN 81 MG: 81 TABLET, COATED ORAL at 09:18

## 2021-10-11 RX ADMIN — LEVOTHYROXINE SODIUM 37.5 MCG: 75 TABLET ORAL at 09:18

## 2021-10-11 RX ADMIN — PROTAMINE SULFATE 25 MG: 10 INJECTION, SOLUTION INTRAVENOUS at 17:35

## 2021-10-11 RX ADMIN — PRAVASTATIN SODIUM 40 MG: 40 TABLET ORAL at 01:19

## 2021-10-11 RX ADMIN — SODIUM CHLORIDE 150 ML/HR: 9 INJECTION, SOLUTION INTRAVENOUS at 17:49

## 2021-10-11 RX ADMIN — CITALOPRAM HYDROBROMIDE 10 MG: 20 TABLET ORAL at 09:18

## 2021-10-11 RX ADMIN — PHYTONADIONE 5 MG: 10 INJECTION, EMULSION INTRAMUSCULAR; INTRAVENOUS; SUBCUTANEOUS at 09:19

## 2021-10-11 RX ADMIN — SODIUM CHLORIDE, PRESERVATIVE FREE 10 ML: 5 INJECTION INTRAVENOUS at 17:37

## 2021-10-11 RX ADMIN — Medication 10 MG: at 01:34

## 2021-10-11 RX ADMIN — SODIUM CHLORIDE, PRESERVATIVE FREE 10 ML: 5 INJECTION INTRAVENOUS at 01:19

## 2021-10-11 RX ADMIN — Medication 10 MG: at 21:23

## 2021-10-11 RX ADMIN — SODIUM CHLORIDE, PRESERVATIVE FREE 10 ML: 5 INJECTION INTRAVENOUS at 21:25

## 2021-10-11 RX ADMIN — SODIUM CHLORIDE, PRESERVATIVE FREE 10 ML: 5 INJECTION INTRAVENOUS at 09:18

## 2021-10-11 RX ADMIN — PHYTONADIONE 5 MG: 10 INJECTION, EMULSION INTRAMUSCULAR; INTRAVENOUS; SUBCUTANEOUS at 17:36

## 2021-10-11 RX ADMIN — METOPROLOL SUCCINATE 25 MG: 25 TABLET, EXTENDED RELEASE ORAL at 01:19

## 2021-10-11 RX ADMIN — SODIUM CHLORIDE, PRESERVATIVE FREE 10 ML: 5 INJECTION INTRAVENOUS at 17:41

## 2021-10-11 NOTE — PLAN OF CARE
Goal Outcome Evaluation:      Patient admitted overnight from ED for chest tightness. States he does still have some discomfort but feels it is mostly anxiety. Afib controlled on monitor with paced beats. NPO since midnight. Zaira Julio RN

## 2021-10-11 NOTE — CASE MANAGEMENT/SOCIAL WORK
Discharge Planning Assessment  [facility]     Patient Name: Victor Manuel Mendoza  MRN: [id number]  Today's Date: 10/11/2021    Admit Date: 10/10/2021     Discharge Needs Assessment     Row Name 10/11/21 1117       Living Environment    Lives With spouse    Name(s) of Who Lives With Patient Annie Mendoza    Current Living Arrangements home/apartment/condo    Duration at Residence 11 years    Primary Care Provided by self    Provides Primary Care For no one    Family Caregiver if Needed spouse       Resource/Environmental Concerns    Transportation Concerns car, none       Transition Planning    Patient/Family Anticipated Services at Transition none    Transportation Anticipated family or friend will provide       Discharge Needs Assessment    Equipment Currently Used at Home none    Concerns to be Addressed no discharge needs identified    Anticipated Changes Related to Illness none    Equipment Needed After Discharge none    Discharge Coordination/Progress Pt has been vaccinated, Pt has PCP.  Pt plans to discharge home indpendently at discharge.               Discharge Plan     Row Name 10/11/21 1121       Plan    Plan Pt has been vaccinated, Pt has PCP.  Pt plans to discharge home indpendently at discharge.              Continued Care and Services - Admitted Since 10/10/2021    Coordination has not been started for this encounter.          Demographic Summary     Row Name 10/11/21 1114       General Information    Admission Type inpatient    Arrived From emergency department    Referral Source admission list    Reason for Consult discharge planning    Preferred Language English       Contact Information    Permission Granted to Share Info With lay caregiver    Contact Information Obtained for lay caregiver    Contact Information Comments Annie Mendoza       Lay Caregiver Information    Name, Lay Caregiver Annie Mendoza    Phone, Lay Caregiver [phone]               Functional Status     Row Name 10/11/21 1117        Functional Status    Usual Activity Tolerance excellent    Current Activity Tolerance excellent       Functional Status, IADL    Medications independent    Meal Preparation independent    Housekeeping independent    Laundry independent    Shopping independent       Mental Status    General Appearance WDL WDL       Mental Status Summary    Recent Changes in Mental Status/Cognitive Functioning no changes       Employment/    Employment Status retired    Current or Previous Occupation construction           Current or Previous  Service none               Psychosocial    No documentation.                Abuse/Neglect    No documentation.                Legal     Row Name 10/11/21 1113       Financial/Legal    Source of Income social security; pension/[employer]       Legal    Criminal Activity/Legal Involvement none               Substance Abuse    No documentation.                Patient Forms    No documentation.                   Blanca Vargas

## 2021-10-11 NOTE — H&P
[facility]   HOSPITALIST HISTORY AND PHYSICAL  Date: 10/10/2021   Patient Name: Victor Manuel Mendoza  : 1950  MRN: [id number]  Primary Care Physician:  Ciro Bland MD  Date of admission: 10/10/2021    Subjective   Subjective     Chief Complaint: Chest tightness    HPI:    Victor Manuel Mendoza is a 71 y.o. male with history of hypertension, paroxysmal A. fib, history of tachybradycardia syndrome with recent pacemaker implantation, BPH, hyperlipidemia, and obstructive sleep apnea who presented to emergency department today due to chest tightness.  Of note, the patient had a pacemaker implanted 3 weeks ago at [facility].  He states that he followed up with his cardiologist that placed earlier this week and everything was healing appropriately.  He states that today he was sitting and resting when he noticed a chest tightness come upon him.  He states it was not worse with exertion, did not improve at rest.  He described as a tightness.  It did not radiate.  He states it did make it difficult to take a deep breath, however denied any pleuritic type chest pain.  He has not had a recent cough, fever, chills, nausea, vomiting.  He is compliant with all meds and denies any missed doses of medications.  He is vaccinated against COVID-19.    In the emergency department, patient's vitals were stable.  Laboratory evaluation revealed elevation in his BNP at 3329.  Troponin was negative x2.  INR 3.8.  Chest x-ray showed no acute infiltrate and left-sided AICD was in place.  EKG revealed nonspecific T wave abnormalities in the inferior leads.  No acute ST segment changes.  Cardiology was consulted by the ED and agreed to see the patient.  Because of the above, the hospitalist team was contacted to admit for further management.      Personal History     Past Medical History:  Past Medical History:   Diagnosis Date   • Allergic    • Anxiety    • Arrhythmia    • Arthritis    • Atrial fibrillation (HCC)    • Bladder  disorder    • Cataract    • Coronary artery disease    • HBP (high blood pressure)    • Heart disease    • High cholesterol    • HL (hearing loss)    • Hyperlipidemia    • Hypertension    • Hypothyroidism    • Low back pain    • Peptic ulceration    • Rectal bleeding    • Sleep apnea     USES CPAP   • Ulcer (traumatic) of oral mucosa    • Visual impairment        Past Surgical History:  Past Surgical History:   Procedure Laterality Date   • CARDIAC ELECTROPHYSIOLOGY PROCEDURE N/A 9/20/2021    Procedure: Pacemaker SC new/SJM;  Surgeon: Clarence Ramírez MD;  Location: [facility] INVASIVE LOCATION;  Service: Cardiology;  Laterality: N/A;   • CATARACT EXTRACTION WITH INTRAOCULAR LENS IMPLANT Bilateral    • COLONOSCOPY     • ENDOSCOPY     • EYE SURGERY  11/1/2020   • SHOULDER SURGERY Right     REPLACEMENT   • SHOULDER SURGERY Left        Family History:   Family History   Problem Relation Age of Onset   • Breast cancer Maternal Grandmother 50   • Colon cancer Maternal Grandfather 70   • Alcohol abuse Maternal Grandfather    • Cancer Maternal Grandfather    • Heart disease Father    • Hypertension Father    History of premature atherosclerosis in his father who passed from an MI at 49.    Social History:    reports that he has never smoked. He has never used smokeless tobacco. He reports previous alcohol use. He reports that he does not use drugs.  Does not remote history of marijuana use.  Drinks 2-3 beers or has 2 shots of whiskey 3-4 times weekly.    Home Medications:  Flax Seed Oil, atorvastatin, citalopram, finasteride, irbesartan, levothyroxine, metoprolol succinate XL, multivitamin, pravastatin, vitamin B-12, and warfarin    Allergies:  No Known Allergies    Review of Systems   All systems were reviewed and negative except for: Chest tightness    Objective   Objective     Vitals:   Temp:  [97.5 °F (36.4 °C)] 97.5 °F (36.4 °C)  Heart Rate:  [60-84] 65  Resp:  [17-18] 17  BP: (107-168)/()  128/84    Physical Exam    Constitutional: Awake, alert, no acute distress   Eyes: Pupils equal, sclerae anicteric, no conjunctival injection   HENT: NCAT, mucous membranes moist   Neck: Supple, no thyromegaly, no lymphadenopathy, trachea midline   Respiratory: Clear to auscultation bilaterally, nonlabored respirations    Cardiovascular: RRR, no murmurs, rubs, or gallops, palpable pedal pulses bilaterally   Gastrointestinal: Positive bowel sounds, soft, nontender, nondistended   Musculoskeletal: No bilateral ankle edema, no clubbing or cyanosis to extremities   Psychiatric: Appropriate affect, cooperative   Neurologic: Oriented x 3, strength symmetric in all extremities, Cranial Nerves grossly intact to confrontation, speech clear   Skin: No rashes     Imaging:     XR Chest 1 View    Result Date: 10/10/2021  PROCEDURE: XR CHEST 1 VW  COMPARISON: [facility], , CHEST AP/PA 1 VIEW, 9/29/2015, 21:06.  INDICATIONS: CHEST TIGHTNESS  FINDINGS:  Left-sided AICD noted.  Heart size within normal limits for technique.  No focal consolidation, pneumothorax, or pleural effusion.  Right shoulder prosthesis noted.  Aortic arch atherosclerosis.  CONCLUSION: No acute process.       MAGALIE ARIAS MD       Electronically Signed and Approved By: MAGALIE ARIAS MD on 10/10/2021 at 19:11               Result Review    Result Review:  I have personally reviewed the results from the time of this admission to 10/10/2021 23:28 EDT and agree with these findings:  [x]  Laboratory  []  Microbiology  [x]  Radiology  [x]  EKG/Telemetry   []  Cardiology/Vascular   []  Pathology  [x]  Old records  []  Other:      Assessment/Plan   Assessment / Plan     Assessment:   Chest pain rule out MI  Elevated BNP without formal diagnosis of heart failure  Supratherapeutic INR  Paroxysmal A. fib anticoagulated with Coumadin  Status post recent pacemaker implantation  Essential hypertension  Hyperlipidemia  BPH    Plan:      Admit to hospitalist  service  Labs and imaging reviewed  Consult cardiology, Dr. Tolbert  Sublingual nitro PRN for chest pain  Echo ordered for the morning  Continue to trend troponins, CK  Morning labs to include lipid panel and A1c  Pharmacy to dose Coumadin  Serial EKGs  Reconcile and resume appropriate home medications    Patient's clinical course will dictate further management  Discussed with ED physician, RN      DVT prophylaxis:  Medical DVT prophylaxis orders are present.    CODE STATUS:         Admission Status:  I believe this patient meets observation status.    Electronically signed by EVELYN Gar, 10/10/21, 11:28 PM EDT.

## 2021-10-11 NOTE — PROGRESS NOTES
[facility]   Hospitalist Progress Note  Date: 10/11/2021  Patient Name: Victor Manuel Mendoza  : 1950  MRN: [id number]  Date of admission: 10/10/2021      Subjective   Subjective     Chief Complaint: Follow-up for chest pain    Summary: 71 y.o. male with history of hypertension, paroxysmal A. fib, history of tachybradycardia syndrome with recent pacemaker implantation, BPH, hyperlipidemia, and obstructive sleep apnea presented with chest pain.  X-ray no acute issue.  Nonspecific T wave changes on EKG. Troponin elevated.  Cardiology on board.  TTE pending.  Cath planned    Interval Followup: No issues overnight.  Up in chair this morning.  On room air.  Denies SOA.  Has mid sternal achy chest pain, constant, severity improved since admission, not exertional.  Discussed work-up and plan for cath tomorrow    Review of Systems   Positive for chest pain   Negative for SOA/KRUEGER    Objective   Objective     Vitals:   Temp:  [97.2 °F (36.2 °C)-97.5 °F (36.4 °C)] 97.2 °F (36.2 °C)  Heart Rate:  [58-84] 71  Resp:  [17-18] 18  BP: (107-168)/() 126/87  Physical Exam    Constitutional:  male, up in chair, alert and conversant, NAD   Eyes: Pupils equal, sclerae anicteric, no conjunctival injection   HENT: NCAT, mucous membranes moist   Neck: Supple, no thyromegaly, trachea midline   Respiratory: Clear to auscultation bilaterally, nonlabored respirations    Cardiovascular: RRR, , no pedal edema   Gastrointestinal: Positive bowel sounds, soft, nontender, nondistended   Musculoskeletal: No gross deformities, no clubbing or cyanosis to extremities   Neurologic: Oriented x 3, Cranial Nerves grossly intact without focal deficit, speech clear   Skin: Warm and dry, no rashes     Result Review    Result Review:  I have personally reviewed the results from the time of this admission to 10/11/2021 09:25 EDT and agree with these findings:  [x]  Laboratory  []  Microbiology  [x]  Radiology  [x]  EKG/Telemetry paced  paced  []  Cardiology/Vascular   []  Pathology  []  Old records  []  Other:    Assessment/Plan   Assessment / Plan     Assessment:  Active Hospital Problems    Diagnosis  POA   • **NSTEMI (non-ST elevated myocardial infarction) (HCC) [I21.4]  Yes   • Supratherapeutic INR [R79.1]  Yes   • Chest pain [R07.9]  Yes   • Benign prostatic hyperplasia without lower urinary tract symptoms [N40.0]  Yes   • Dyslipidemia [E78.5]  Yes   • Long term (current) use of anticoagulants [Z79.01] [Z79.01]  Not Applicable   • Paroxysmal atrial fibrillation (HCC) [I48.0]  Yes   • Essential hypertension [I10]  Yes         Plan:    Cardiology consult, planning for cath 10/12  Warfarin held. INR reversed this morning with vitamin K.  Recheck INR in a.m.  Continue daily baby aspirin  LDL 70, continue statin therapy  Continue Toprol-XL 25 mg daily   Continue Synthroid  Continue Finasteride  Continue Celexa  Activity as tolerated  A.m. labs  N.p.o. after midnight    Discussed plan with RN.    DVT prophylaxis:  Medical and mechanical DVT prophylaxis orders are present.    CODE STATUS:   Code Status: CPR  Medical Interventions (Level of Support Prior to Arrest): Full      Electronically signed by Tom Holcomb DO, 10/11/21, 9:25 AM EDT.

## 2021-10-11 NOTE — PROGRESS NOTES
Pharmacy to Dose: Warfarin    Consulting provider: Cheryl RIVAS  Indication for warfarin: Atrial Fibrillation  Goal INR range: 2-3  Home warfarin dose: 5 mg Tuesday - Sunday and 7.5 mg on Mondays   Actions or monitoring:   Per MHT patient did not receive take dose on 10/10.  INR ordered x 2 days     Date INR Warfarin Dose Given   10/10  3.8  Held (last dose on 10/9)   10/11                      Any Vitamin K given?: No  Any major drug interactions: Home medication: Citalopram, Synthroid, Pravachol  Inpatient: Home medications only   Is patient on bridging therapy with another anticoagulant?: No    Plan: Recheck INR in am  Once in or near therapeutic range will restart coumadin at lower than home dose     Thank you for the consult.  Kamilla Fragoso, KimD

## 2021-10-11 NOTE — PRE-SEDATION DOCUMENTATION
Sedation Plan    ASA 2     Mallampati class: II.    Risks, benefits, and alternatives discussed with patient.    Brock Tolbert MD

## 2021-10-11 NOTE — CONSULTS
[facility]    CARDIOLOGY CONSULT NOTE    Patient Name: Victor Manuel Mendoza  : 1950  MRN: [id number]  Primary Care Physician:  Ciro Bland MD  Date of admission: 10/10/2021    Subjective   Subjective     Chief Complaint: chest pain    HPI:  Victor Manuel Mendoza is a 71 y.o. male with a history of moderate nonobstructive CAD (per last C 15-20 years ago), permanent atrial fibrillation (on chronic anticoagulation with warfarin), recent PPM placement for tachy-staci syndrome, hypertension, and hyperlipidemia.  He presented to the ED last evening with substernal chest tightness/pressure that began approximately 90 minutes prior to arrival.  Mr. Mendoza stated that his pain did not radiate outside the chest and was associated with generalized fatigue and mild shortness of breath, but no nausea/vomiting, palpitations, diaphoresis, or other symptoms.  His admission ECG showed atrial fibrillation with intermittent ventricular pacing and nonspecific ST-T changes.  A follow-up ECG shows atrial fibrillation with symmetric T wave inversions in the inferior leads, concerning for ischemia.  His Troponin level was mildly elevated at 0.115 and subsequently increased to 0.123.  He continues to report some mild substernal chest discomfort, but states it is 1/10 and significantly improved since admission.  Mr. Mendoza does not report any cardiac ischemic evaluation for many years.      Review of Systems   Constitutional: Positive for fatigue. Negative for activity change, chills and fever.   HENT: Negative for hearing loss, sore throat and trouble swallowing.    Eyes: Negative for pain and visual disturbance.   Respiratory: Positive for shortness of breath. Negative for chest tightness and wheezing.    Cardiovascular: Positive for chest pain. Negative for palpitations and leg swelling.   Gastrointestinal: Negative for abdominal distention, abdominal pain, blood in stool and vomiting.   Endocrine: Negative for cold intolerance and heat  intolerance.   Genitourinary: Negative for dysuria and hematuria.   Musculoskeletal: Negative for arthralgias and joint swelling.   Skin: Negative for color change, pallor and rash.   Neurological: Negative for syncope, speech difficulty, light-headedness and headaches.   Hematological: Negative for adenopathy. Does not bruise/bleed easily.        Personal History     Past Medical History:   Diagnosis Date   • Allergic    • Anxiety    • Arrhythmia    • Arthritis    • Atrial fibrillation (HCC)    • Bladder disorder    • Cataract    • Coronary artery disease    • HBP (high blood pressure)    • Heart disease    • High cholesterol    • HL (hearing loss)    • Hyperlipidemia    • Hypertension    • Hypothyroidism    • Low back pain    • Peptic ulceration    • Rectal bleeding    • Sleep apnea     USES CPAP   • Ulcer (traumatic) of oral mucosa    • Visual impairment        Past Surgical History:   Procedure Laterality Date   • CARDIAC ELECTROPHYSIOLOGY PROCEDURE N/A 9/20/2021    Procedure: Pacemaker SC new/DINA;  Surgeon: Clarence Ramírez MD;  Location: [facility] INVASIVE LOCATION;  Service: Cardiology;  Laterality: N/A;   • CATARACT EXTRACTION WITH INTRAOCULAR LENS IMPLANT Bilateral    • COLONOSCOPY     • ENDOSCOPY     • EYE SURGERY  11/1/2020   • SHOULDER SURGERY Right     REPLACEMENT   • SHOULDER SURGERY Left        Family History: family history includes Alcohol abuse in his maternal grandfather; Breast cancer (age of onset: 50) in his maternal grandmother; Cancer in his maternal grandfather; Colon cancer (age of onset: 70) in his maternal grandfather; Heart disease in his father; Hypertension in his father. Otherwise pertinent FHx was reviewed and not pertinent to current issue.    Social History:  reports that he has never smoked. He has never used smokeless tobacco. He reports current alcohol use. He reports that he does not use drugs.    Home Medications:  Flax Seed Oil, atorvastatin, citalopram, finasteride,  irbesartan, levothyroxine, metoprolol succinate XL, multivitamin, pravastatin, vitamin B-12, and warfarin    Allergies:  No Known Allergies    Objective    Objective     Vitals:   Temp:  [97.2 °F (36.2 °C)-97.5 °F (36.4 °C)] 97.2 °F (36.2 °C)  Heart Rate:  [58-84] 64  Resp:  [17-18] 18  BP: (107-168)/() 124/82    Physical Exam  Constitutional:       General: He is not in acute distress.     Appearance: Normal appearance.   HENT:      Head: Atraumatic.      Mouth/Throat:      Mouth: Mucous membranes are moist.      Pharynx: Oropharynx is clear. No oropharyngeal exudate.   Eyes:      General: No scleral icterus.     Conjunctiva/sclera: Conjunctivae normal.   Neck:      Vascular: No carotid bruit or JVD.   Cardiovascular:      Rate and Rhythm: Normal rate. Rhythm irregularly irregular.      Chest Wall: PMI is not displaced.      Pulses: Normal pulses.           Radial pulses are 2+ on the right side and 2+ on the left side.      Heart sounds: S1 normal and S2 normal. No murmur heard.  No friction rub. No gallop. No S3 or S4 sounds.    Pulmonary:      Effort: Pulmonary effort is normal. No tachypnea or respiratory distress.      Breath sounds: No decreased breath sounds, wheezing, rhonchi or rales.   Chest:      Chest wall: No tenderness.   Abdominal:      General: Bowel sounds are normal. There is no distension.      Palpations: Abdomen is soft. There is no mass.      Tenderness: There is no abdominal tenderness.   Musculoskeletal:         General: No swelling, tenderness or deformity.      Cervical back: Neck supple. No tenderness.      Right lower leg: No edema.      Left lower leg: No edema.   Skin:     General: Skin is warm and dry.      Coloration: Skin is not jaundiced.      Findings: No erythema or rash.      Nails: There is no clubbing.   Neurological:      General: No focal deficit present.      Mental Status: He is alert and oriented to person, place, and time.      Motor: No weakness.   Psychiatric:          Mood and Affect: Mood normal.         Behavior: Behavior normal.       Result Review    Result Review:  I have personally reviewed the results from the time of this admission to 10/11/2021 06:18 EDT and agree with these findings:  [x]  Laboratory  []  Microbiology  [x]  Radiology  [x]  EKG/Telemetry   [x]  Cardiology/Vascular   []  Pathology  []  Old records  []  Other:  Most notable findings include: Troponin = 0.123, previously was 0.115; Cr = 1.04, Hgb = 11.8, INR = 3.05; CXR showed no acute cardiopulmonary process    Assessment/Plan   Assessment / Plan     Brief Patient Summary:  Victor Manuel Mendoza is a 71 y.o. male with:  -NSTEMI, NELA risk score = 4  -Permanent atrial fibrillation, on chronic anticoagulation with warfarin  -Supratherapeutic INR of 3.85 on admission  -s/p recent PPM placement for tachy-staci syndrome  -Essential hypertension  -Hyperlipidemia  -BPH    Active Hospital Problems:  Active Hospital Problems    Diagnosis    • Chest pain        Plan:   -I discussed multiple options for further evaluation/treatment of his NSTEMI, including continued medical therapy versus definitive evaluation with LHC (and possible PCI).  Mr. Mendoza wishes to proceed with a left heart cath, but his INR is currently 3.05.  Warfarin has been held, and I will give 5 mg of IV vitamin K now and repeat an INR later this afternoon.  If his INR has suitably decreased, will plan to proceed with LHC later this afternoon.  The risks (death, heart attack, stroke, loss of limb, infection, arterial dissection, coronary artery perforation and pericardial tamponade, severe bleeding and potential need for blood transfusion, renal failure and potential need for permanent dialysis, anaphylaxis) and benefits of the procedure were reviewed in detail with Mr. Mendoza.  He voiced understanding and wishes to proceed.  We will plan for a right radial approach.  -He is achieving adequate rate control of his permanent atrial fibrillation on the  current dose of Toprol-XL  -Continue ASA and high-intensity statin.  Will start a NTG drip if he has any worsening of his mild chest pain.  Defer P2Y12 inhibitor until coronary anatomy known.      DVT prophylaxis:  Medical and mechanical DVT prophylaxis orders are present.    CODE STATUS:    Code Status: CPR  Medical Interventions (Level of Support Prior to Arrest): Full      Electronically signed by Brock Tolbert MD, 10/11/21, 6:18 AM EDT.

## 2021-10-12 ENCOUNTER — READMISSION MANAGEMENT (OUTPATIENT)
Dept: CALL CENTER | Facility: HOSPITAL | Age: 71
End: 2021-10-12

## 2021-10-12 VITALS
HEART RATE: 73 BPM | WEIGHT: 182.98 LBS | RESPIRATION RATE: 19 BRPM | HEIGHT: 72 IN | OXYGEN SATURATION: 99 % | BODY MASS INDEX: 24.78 KG/M2 | TEMPERATURE: 98.6 F | SYSTOLIC BLOOD PRESSURE: 119 MMHG | DIASTOLIC BLOOD PRESSURE: 72 MMHG

## 2021-10-12 LAB
ANION GAP SERPL CALCULATED.3IONS-SCNC: 8.1 MMOL/L (ref 5–15)
BUN SERPL-MCNC: 16 MG/DL (ref 8–23)
BUN/CREAT SERPL: 14.2 (ref 7–25)
CALCIUM SPEC-SCNC: 8.3 MG/DL (ref 8.6–10.5)
CHLORIDE SERPL-SCNC: 103 MMOL/L (ref 98–107)
CO2 SERPL-SCNC: 24.9 MMOL/L (ref 22–29)
CREAT SERPL-MCNC: 1.13 MG/DL (ref 0.76–1.27)
DEPRECATED RDW RBC AUTO: 46.1 FL (ref 37–54)
ERYTHROCYTE [DISTWIDTH] IN BLOOD BY AUTOMATED COUNT: 12.8 % (ref 12.3–15.4)
GFR SERPL CREATININE-BSD FRML MDRD: 64 ML/MIN/1.73
GLUCOSE SERPL-MCNC: 93 MG/DL (ref 65–99)
HCT VFR BLD AUTO: 35.9 % (ref 37.5–51)
HGB BLD-MCNC: 11.9 G/DL (ref 13–17.7)
INR PPP: 1.24 (ref 2–3)
MCH RBC QN AUTO: 32.6 PG (ref 26.6–33)
MCHC RBC AUTO-ENTMCNC: 33.1 G/DL (ref 31.5–35.7)
MCV RBC AUTO: 98.4 FL (ref 79–97)
PLATELET # BLD AUTO: 205 10*3/MM3 (ref 140–450)
PMV BLD AUTO: 9.6 FL (ref 6–12)
POTASSIUM SERPL-SCNC: 3.9 MMOL/L (ref 3.5–5.2)
PROTHROMBIN TIME: 13.2 SECONDS (ref 9.4–12)
RBC # BLD AUTO: 3.65 10*6/MM3 (ref 4.14–5.8)
SODIUM SERPL-SCNC: 136 MMOL/L (ref 136–145)
WBC # BLD AUTO: 8.79 10*3/MM3 (ref 3.4–10.8)

## 2021-10-12 PROCEDURE — 93005 ELECTROCARDIOGRAM TRACING: CPT | Performed by: INTERNAL MEDICINE

## 2021-10-12 PROCEDURE — A9270 NON-COVERED ITEM OR SERVICE: HCPCS | Performed by: INTERNAL MEDICINE

## 2021-10-12 PROCEDURE — 63710000001 SENNOSIDES-DOCUSATE 8.6-50 MG TABLET: Performed by: INTERNAL MEDICINE

## 2021-10-12 PROCEDURE — 63710000001 LEVOTHYROXINE 75 MCG TABLET: Performed by: INTERNAL MEDICINE

## 2021-10-12 PROCEDURE — 94799 UNLISTED PULMONARY SVC/PX: CPT

## 2021-10-12 PROCEDURE — 63710000001 FINASTERIDE 5 MG TABLET: Performed by: INTERNAL MEDICINE

## 2021-10-12 PROCEDURE — 99214 OFFICE O/P EST MOD 30 MIN: CPT | Performed by: INTERNAL MEDICINE

## 2021-10-12 PROCEDURE — 63710000001 CITALOPRAM 20 MG TABLET: Performed by: INTERNAL MEDICINE

## 2021-10-12 PROCEDURE — 80048 BASIC METABOLIC PNL TOTAL CA: CPT | Performed by: INTERNAL MEDICINE

## 2021-10-12 PROCEDURE — G0378 HOSPITAL OBSERVATION PER HR: HCPCS

## 2021-10-12 PROCEDURE — 63710000001 METOPROLOL SUCCINATE XL 25 MG TABLET SUSTAINED-RELEASE 24 HOUR: Performed by: INTERNAL MEDICINE

## 2021-10-12 PROCEDURE — 63710000001 CLOPIDOGREL 75 MG TABLET: Performed by: INTERNAL MEDICINE

## 2021-10-12 PROCEDURE — 85610 PROTHROMBIN TIME: CPT | Performed by: INTERNAL MEDICINE

## 2021-10-12 PROCEDURE — 85027 COMPLETE CBC AUTOMATED: CPT | Performed by: INTERNAL MEDICINE

## 2021-10-12 PROCEDURE — 63710000001 PRAVASTATIN 40 MG TABLET: Performed by: INTERNAL MEDICINE

## 2021-10-12 PROCEDURE — 63710000001 ASPIRIN 81 MG TABLET DELAYED-RELEASE: Performed by: INTERNAL MEDICINE

## 2021-10-12 PROCEDURE — 99217 PR OBSERVATION CARE DISCHARGE MANAGEMENT: CPT | Performed by: INTERNAL MEDICINE

## 2021-10-12 PROCEDURE — 93010 ELECTROCARDIOGRAM REPORT: CPT | Performed by: INTERNAL MEDICINE

## 2021-10-12 RX ORDER — IRBESARTAN 300 MG/1
300 TABLET ORAL DAILY
Qty: 90 TABLET | Refills: 3 | Status: SHIPPED | OUTPATIENT
Start: 2021-10-12

## 2021-10-12 RX ORDER — CLOPIDOGREL BISULFATE 75 MG/1
75 TABLET ORAL DAILY
Qty: 30 TABLET | Refills: 0 | Status: SHIPPED | OUTPATIENT
Start: 2021-10-13 | End: 2021-11-12

## 2021-10-12 RX ORDER — ASPIRIN 81 MG/1
81 TABLET ORAL DAILY
Qty: 14 TABLET | Refills: 0 | Status: SHIPPED | OUTPATIENT
Start: 2021-10-13 | End: 2021-10-27

## 2021-10-12 RX ADMIN — PRAVASTATIN SODIUM 40 MG: 40 TABLET ORAL at 09:11

## 2021-10-12 RX ADMIN — ASPIRIN 81 MG: 81 TABLET, COATED ORAL at 09:08

## 2021-10-12 RX ADMIN — LEVOTHYROXINE SODIUM 37.5 MCG: 75 TABLET ORAL at 09:09

## 2021-10-12 RX ADMIN — CITALOPRAM HYDROBROMIDE 10 MG: 20 TABLET ORAL at 09:11

## 2021-10-12 RX ADMIN — FINASTERIDE 5 MG: 5 TABLET, FILM COATED ORAL at 09:08

## 2021-10-12 RX ADMIN — CLOPIDOGREL BISULFATE 75 MG: 75 TABLET ORAL at 09:09

## 2021-10-12 RX ADMIN — DOCUSATE SODIUM 50MG AND SENNOSIDES 8.6MG 2 TABLET: 8.6; 5 TABLET, FILM COATED ORAL at 09:08

## 2021-10-12 RX ADMIN — SODIUM CHLORIDE, PRESERVATIVE FREE 10 ML: 5 INJECTION INTRAVENOUS at 09:11

## 2021-10-12 RX ADMIN — METOPROLOL SUCCINATE 25 MG: 25 TABLET, EXTENDED RELEASE ORAL at 09:10

## 2021-10-12 NOTE — DISCHARGE SUMMARY
[facility]         HOSPITALIST  DISCHARGE SUMMARY    Patient Name: Victor Manuel Mendoza  : 1950  MRN: [id number]    Date of Admission: 10/10/2021  Date of Discharge: 10/12/2021  Primary Care Physician: Ciro Bland MD    Reason for admission:  · Chest pain    Final diagnosis:  · Non-ST segment elevation myocardial infarction status post PCI to distal LAD and PL branch of RCA 10/11/2021 with Xience Yana BURKE   · Coronary artery disease  · Permanent atrial fibrillation previously on anticoagulation with Coumadin has been transitioned to Xarelto  · Supratherapeutic INR on admission 3.85  · Status post recent pacemaker placement for tachybradycardia syndrome  · Hypertension  · Hyperlipidemia  · BPH      Consults     Date and Time Order Name Status Description    10/11/2021 12:37 AM Inpatient Cardiology Consult Completed     10/10/2021 10:10 PM Cardiology (on-call MD unless specified) Completed     10/10/2021  9:49 PM Hospitalist (on-call MD unless specified) Completed           Active and Resolved Hospital Problems:  Active Hospital Problems    Diagnosis POA   • **NSTEMI (non-ST elevated myocardial infarction) (HCC) [I21.4] Yes   • Supratherapeutic INR [R79.1] Yes   • S/P coronary artery stent placement [Z95.5] Not Applicable   • Chest pain [R07.9] Yes   • Benign prostatic hyperplasia without lower urinary tract symptoms [N40.0] Yes   • Dyslipidemia [E78.5] Yes   • Long term (current) use of anticoagulants [Z79.01] [Z79.01] Not Applicable   • Paroxysmal atrial fibrillation (HCC) [I48.0] Yes   • Essential hypertension [I10] Yes      Resolved Hospital Problems   No resolved problems to display.       Hospital Course     Hospital Course:  Victor Manuel Mendoza is a 71 y.o. male with history of hypertension, A. fib on anticoagulation with Coumadin, history of tachybradycardia syndrome with recent pacemaker implantation, BPH, hyperlipidemia, and obstructive sleep apnea presented with chest pain.  X-ray no  acute issue.  Nonspecific T wave changes on EKG. Troponin elevated.    Admission requested cardiology consulted.  2D echocardiogram obtained.  Cardiology saw the patient recommended left heart catheterization patient sequently underwent successful PCI without complications patient will be started on dual antiplatelet therapy with aspirin 81 mg daily and Plavix 75 mg daily after 2 weeks aspirin will be discontinued patient will continue Plavix for at least a year.  Cardiology also discussed chronic anticoagulation options for his A. fib patient wants to discontinue Coumadin and start NOAC cardiology has recommended Xarelto 20 mg daily samples to be provided.  Today on discharge no apparent plications from PCI he is doing well he is without chest pains or shortness of air without palpitations hemodynamically stable will be discharged with orders to follow-up with his PCP follow-up with cardiology Dr. Tolbert follow-up with his normal cardiologist in [place] patient return to emergency department for worsening symptoms.      DISCHARGE Follow Up Recommendations for labs and diagnostics: PCP 1 week cardiology as directed      Day of Discharge     Vital Signs:  Temp:  [97.2 °F (36.2 °C)-98.7 °F (37.1 °C)] 98.6 °F (37 °C)  Heart Rate:  [60-73] 73  Resp:  [16-19] 19  BP: ()/(50-89) 119/72  Flow (L/min):  [2] 2  Physical Exam:                 Constitutional:  male, up in chair, alert and conversant, NAD              Eyes: Pupils equal, sclerae anicteric, no conjunctival injection              HENT: NCAT, mucous membranes moist              Neck: Supple, no thyromegaly, trachea midline              Respiratory: Clear to auscultation bilaterally, nonlabored respirations               Cardiovascular: RRR, , trace pedal edema              Gastrointestinal: Positive bowel sounds, soft, nontender, nondistended              Musculoskeletal: No gross deformities, no clubbing or cyanosis to extremities               Neurologic: Oriented x 3, Cranial Nerves grossly intact without focal deficit, speech clear              Skin: Warm and dry, no rashes        Discharge Details          Discharge Medications      New Medications      Instructions Start Date   aspirin 81 MG EC tablet   81 mg, Oral, Daily   Start Date: October 13, 2021     clopidogrel 75 MG tablet  Commonly known as: PLAVIX   75 mg, Oral, Daily   Start Date: October 13, 2021     rivaroxaban 20 MG tablet  Commonly known as: Xarelto   20 mg, Oral, Daily         Changes to Medications      Instructions Start Date   irbesartan 300 MG tablet  Commonly known as: AVAPRO  What changed: additional instructions   300 mg, Oral, Daily, Hold til seen by pcp monitor bp at home         Continue These Medications      Instructions Start Date   citalopram 10 MG tablet  Commonly known as: CeleXA   1 tablet, Oral, Daily      finasteride 5 MG tablet  Commonly known as: PROSCAR   TAKE 1 TABLET BY MOUTH EVERY DAY      Flax Seed Oil 1300 MG capsule   Oral      levothyroxine 25 MCG tablet  Commonly known as: SYNTHROID, LEVOTHROID   TAKE 1.5 TABLETS BY MOUTH EVERY DAY      metoprolol succinate XL 25 MG 24 hr tablet  Commonly known as: TOPROL-XL   25 mg, Oral, Daily      multivitamin tablet tablet  Commonly known as: THERAGRAN   Oral      pravastatin 40 MG tablet  Commonly known as: PRAVACHOL   40 mg, Oral, Daily      vitamin B-12 1000 MCG tablet  Commonly known as: CYANOCOBALAMIN   1,000 mcg, Oral, Daily         Stop These Medications    atorvastatin 40 MG tablet  Commonly known as: LIPITOR     warfarin 5 MG tablet  Commonly known as: COUMADIN            No Known Allergies    Discharge Disposition: Home self-care      Diet:  Hospital:  Diet Order   Procedures   • Diet Regular; Cardiac       Discharge Activity: Advance slowly over the next several days      CODE STATUS:  Code Status and Medical Interventions:   Ordered at: 10/10/21 7835     Code Status:    CPR     Medical Interventions (Level  of Support Prior to Arrest):    Full         Future Appointments   Date Time Provider Department Center   12/3/2021  4:15 PM Ciro Bland MD [facility]   2022 10:30 AM Danielle Scott MD [facility]   10/6/2022 10:15 AM Clarence Ramírez MD [facility]           Pertinent  and/or Most Recent Results     PROCEDURES:     CARDIAC CATHETERIZATION PROCEDURE REPORT     Patient: Victor Manuel Mendoza  : 1950  MRN: [id number]  Procedure Date: 10/11/21     Referring Physician:   Marquise Carbajal MD     Interventional Cardiologist:   Brock Tolbert MD     Indication:  1. NSTEMI, NELA risk score = 4  2. History of moderate nonobstructive coronary artery disease [facility]     Clinical Presentation:  Intermittent substernal chest pain over the past two days     Procedure performed:  1. Left Heart Catheterization  2. Selective Coronary Angiography  3. Left Ventriculography  4. Successful PCI to the distal LAD using Xience Yana 2.25/18 and 2.25/12 mm drug-eluting stents  5. Successful PCI to the posterolateral branch of the RCA using a Xience Yana 2.75/15 mm drug-eluting stent  6. Successful PTCA to the ostium of the PDA branch of the RCA using a Trek 2.5/12 mm balloon      Access Site(s):  1. Right radial artery      Findings:  1. Coronary Artery Anatomy:  Dominance: right  Left Main: calcified with 10-20% stenosis distally; 4.5 mm diameter vessel that bifurcates distally into the LAD and circumflex, no dampening or ventricularization of the pressure waveform upon engagement of the vessel  Left Anterior Descending: moderate calcification observed under fluoroscopy throughout the proximal-mid LAD, 20% stenosis in the proximal LAD with a focal 50% stenosis at the bifurcation of the principal diagonal branch, distal LAD contains diffuse disease with up to 80-90% stenosis; 70% ostial lesion in the principal diagonal branch  Left Circumflex Artery: 20% stenosis in proximal  circumflex, gives rise to several OM branches distally, all of which appeared free of significant disease  Right Coronary Artery: tortuous vessel with diffuse disease in the mid RCA with up to 50% stenosis, mild luminal irregularities in the distal RCA, 90% stenosis in the ostium of the PL branch which involves the PDA bifurcation     2. Hemodynamics:  Left Ventricle: 126/9, LVEDP = 18 mmHg; no gradient upon pullback across the aortic valve  Aorta: 139/82 mmHg (opening pressure), 138/66 (closing pressure)     3. Left Ventriculogram:  Ejection Fraction: 60%  Wall Motion: focal area of hypokinesis in the distal inferior wall  Mitral Regurgitation: 1+     4. Percutaneous Intervention:  Location: distal LAD  Treatment: BURKE placement  Pre-stenosis: 80-90%  Post-stenosis: 0%  Lesion Type: B2  NELA Flow Pre: 3  NELA Flow Post: 3  Bifurcation: no  Severe Calcium: no  Dissection: no     Location: PL branch of RCA  Treatment: BURKE placement  Pre-stenosis: 90%  Post-stenosis: 0%  Lesion Type: B2  NELA Flow Pre: 3  NELA Flow Post: 3  Bifurcation: yes  Severe Calcium: no  Dissection: no     Conclusions:  1. Successful PCI to the distal LAD using Xience Yana 2.25/18 and 2.25/12 mm drug-eluting stents.  Preprocedure diffuse disease with up to 80-90% stenosis, post-procedure 0% residual stenosis.  NELA-3 flow was present pre-and post-procedure.  Type B2 lesion.  No evidence of complications.  2. Successful PCI to the ostial segment of the posterolateral branch of the RCA using a Xience Yana 2.75/15 mm drug-eluting stent.  Pre-procedure 90% stenosis, post-procedure 0% residual stenosis.  Type B2 lesion.  No evidence of complications.  3. Successful PTCA to the ostium of the PDA branch of the RCA using a Trek 2.5/12 mm balloon.  Pre-procedure 70% stenosis, post-procedure 30-40% residual stenosis.  Type B2 lesion.  No evidence of complications.  4. Residual moderate nonobstructive disease in the ostium of the diagonal branch and in  the mid RCA.  5. Normal left ventricular systolic function with an estimated ejection fraction of 60%.  A focal area of hypokinesis was observed in the distal inferior wall, but otherwise normal left ventricular wall motion throughout.  6. Borderline elevated LVEDP.     Recommendations:   1. Continue routine post-interventional care and monitoring, including post-procedure IV fluid hydration for prophylaxis scans contrast-induced nephropathy.  2. Mr. Mendoza will require dual antiplatelet therapy with aspirin 81 mg daily and clopidogrel 75 mg daily for two weeks.  After that time, I would recommend discontinuing aspirin and continuing antiplatelet monotherapy with clopidogrel for 1 year (due to the patient's concomitant chronic anticoagulation with warfarin and the excessive bleeding risks associated with long-term triple therapy).  3. Continue optimal medical therapy for coronary artery disease, including high-intensity statin therapy with an LDL goal <70.  Will also continue his chronic dose of Toprol-XL.    4. Will arrange outpatient cardiac rehab.  Secondary prevention instructions were reviewed in detail with Mr. Mendoza and his wife.       LAB RESULTS:      Lab 10/12/21  0502 10/11/21  1351 10/11/21  0533 10/10/21  1828   WBC 8.79  --  4.85 6.82   HEMOGLOBIN 11.9*  --  11.8* 12.5*   HEMATOCRIT 35.9*  --  34.8* 37.7   PLATELETS 205  --  193 215   NEUTROS ABS  --   --   --  3.91   IMMATURE GRANS (ABS)  --   --   --  0.01   LYMPHS ABS  --   --   --  2.06   MONOS ABS  --   --   --  0.71   EOS ABS  --   --   --  0.10   MCV 98.4*  --  98.3* 99.5*   PROTIME 13.2* 20.0* 30.9* 38.1*         Lab 10/12/21  0502 10/11/21  0533 10/10/21  1828   SODIUM 136 138 139   POTASSIUM 3.9 3.9 3.7   CHLORIDE 103 106 105   CO2 24.9 22.9 22.3   ANION GAP 8.1 9.1 11.7   BUN 16 16 14   CREATININE 1.13 1.04 1.09   GLUCOSE 93 91 97   CALCIUM 8.3* 8.2* 8.6   MAGNESIUM  --  1.9 1.9   HEMOGLOBIN A1C  --  5.00  --          Lab 10/10/21  1828   TOTAL  PROTEIN 6.5   ALBUMIN 4.30   GLOBULIN 2.2   ALT (SGPT) 26   AST (SGOT) 28   BILIRUBIN 0.6   ALK PHOS 38*   LIPASE 25         Lab 10/12/21  0502 10/11/21  1351 10/11/21  0533 10/11/21  0211 10/10/21  1828   PROBNP  --   --   --   --  3,329.0*   TROPONIN T  --   --  0.123* 0.115*  --    PROTIME 13.2* 20.0* 30.9*  --  38.1*   INR 1.24* 1.95* 3.05*  --  3.80*         Lab 10/11/21  0533   CHOLESTEROL 128   LDL CHOL 70   HDL CHOL 38*   TRIGLYCERIDES 105             Brief Urine Lab Results     None        Microbiology Results (last 10 days)     ** No results found for the last 240 hours. **          XR Chest 1 View    Result Date: 9/20/2021  Impression: Pacemaker placement. No pneumothorax.  This report was finalized on 9/20/2021 2:59 PM by Dr. Panchito Galvan M.D.                Results for orders placed during the hospital encounter of 08/30/21    Adult Transthoracic Echo Complete W/ Cont if Necessary Per Protocol    Interpretation Summary  · Calculated left ventricular EF = 59.6% Estimated left ventricular EF was in agreement with the calculated left ventricular EF. Left ventricular systolic function is normal.  · The right ventricular cavity is borderline dilated.  · Mildly reduced right ventricular systolic function noted.  · Left ventricular diastolic function was indeterminate.  · Left atrial volume is severely increased.  · The right atrial cavity is moderate to severely dilated.  · There is mild, bileaflet mitral valve thickening present.  · Mild to moderate mitral valve regurgitation is present.  · Mild tricuspid valve regurgitation is present.  · Estimated right ventricular systolic pressure from tricuspid regurgitation is mildly elevated (35-45 mmHg).  · Mild dilation of the aortic root is present.      Labs Pending at Discharge:        Time spent on Discharge including face to face service: Greater than 35 minutes    Electronically signed by EVELYN Boss, 10/12/21, 1:34 PM EDT.    Attending Note:  I  have seen and examined the patient on the day of discharge and agree with above per Mr. Terry PA-C.  Pleasant 71-year-old male presented with chest pain, diagnosed with NSTEMI, intervention per cardiology.  Tolerated well.  Discharging home today, cardiology recommends aspirin and Plavix for now, then will discontinue aspirin and continue Plavix only.  For anticoagulation switching from warfarin to Xarelto.  Patient to followup with primary care provider and cardiologist.  Electronically signed by Jorge Samuels MD, 10/12/21, 9:32 PM EDT.

## 2021-10-12 NOTE — PROGRESS NOTES
[facility]     Progress Note    Patient Name: Victor Manuel Mendoza  : 1950  MRN: [id number]  Primary Care Physician:  Ciro Bland MD  Date of admission: 10/10/2021    Subjective   Subjective     HPI:  Mr. Mendoza is resting comfortably and does not report any recurrent chest pain/pressure or any dyspnea, palpitations, nausea, or other symptoms. His telemetry monitor shows atrial fibrillation with intermittent ventricular pacing and a heart rate in the 60s-70s overnight.  His labs remain stable this morning and he does not report any bleeding or swelling at his right radial cath access site.    Review of Systems  Negative except as per HPI    Objective   Objective     Vitals:   Temp:  [97.2 °F (36.2 °C)-98.7 °F (37.1 °C)] 98.6 °F (37 °C)  Heart Rate:  [60-73] 73  Resp:  [16-19] 19  BP: ()/(50-89) 119/72  Flow (L/min):  [2] 2    Physical Exam  General: alert and oriented, no distress  Neck: supple, no JVD, no bruit, no masses  Chest: clear to auscultation bilaterally, no rales or wheezing, no tachypnea, normal effort  CV: irregularly irregular rhythm, normal rate, S1 and S2 present, no S3 or S4 gallop, no murmur  Abdomen: soft, non-tender, non-distended, + bowel sounds  Extremities: no cyanosis or edema, 2+ radial pulses bilaterally, very small ecchymosis present at right wrist but no hematoma or swelling  Neuro: normal speech, no focal deficits     Current Medications:   •  acetaminophen  •  acetaminophen  •  aluminum-magnesium hydroxide-simethicone  •  aspirin  •  senna-docusate sodium **AND** polyethylene glycol **AND** bisacodyl **AND** bisacodyl  •  citalopram  •  clopidogrel  •  finasteride  •  levothyroxine  •  melatonin  •  metoprolol succinate XL  •  Morphine **AND** naloxone  •  nitroglycerin  •  ondansetron  •  ondansetron **OR** ondansetron  •  pravastatin  •  sodium chloride  •  sodium chloride  •  sodium chloride     Result Review    Result Review:  I have personally reviewed the results  from the time of this admission to 10/12/2021 12:43 EDT and agree with these findings:  [x]  Laboratory  []  Microbiology  []  Radiology  [x]  EKG/Telemetry   [x]  Cardiology/Vascular   []  Pathology  []  Old records  []  Other:  Most notable findings include: Cr = 1.13, K = 3.9, Hgb = 11.9, platelets = 205, INR = 1.24      Assessment/Plan   Assessment / Plan     Brief Patient Summary:  Victor Manuel Mendoza is a 71 y.o. male with:  -NSTEMI, s/p PCI to distal LAD and PL branch of RCA yesterday with Xience Yana BURKE  -Coronary artery disease   -Permanent atrial fibrillation, on chronic anticoagulation with warfarin  -Supratherapeutic INR of 3.85 on admission  -s/p recent PPM placement for tachy-staci syndrome  -Essential hypertension  -Hyperlipidemia  -BPH    Active Hospital Problems:  Active Hospital Problems    Diagnosis    • **NSTEMI (non-ST elevated myocardial infarction) (HCC)    • Supratherapeutic INR    • S/P coronary artery stent placement    • Chest pain    • Benign prostatic hyperplasia without lower urinary tract symptoms    • Dyslipidemia    • Long term (current) use of anticoagulants [Z79.01]    • Paroxysmal atrial fibrillation (HCC)    • Essential hypertension        Plan:   -No evidence of PCI complications and he is doing well. No objection to discharge home this morning from a cardiovascular perspective.    -Recommend DAPT with aspirin 81 mg daily and clopidogrel 75 mg daily for two weeks, then would discontinue aspirin and continue antiplatelet monotherapy with clopidogrel for a minimum of 1 year (due to the patient's concomitant chronic anticoagulation and the high risk for serious bleeding events with long-term triple therapy)  -He wishes to stop warfarin and switch to a NOAC.  He will stop by our office this afternoon to  some Xarelto 20 mg daily samples.    -Continue high-intensity statin with a LDL goal <70  -Continue Toprol XL at chronic dose  -Will arrange outpatient cardiac rehab  -He  follows with Dr. Danielle Scott at [facility], but I will plan to see him once for interventional/PCI follow up in 4-6 weeks in our office   -PPM shows normal device function      DVT prophylaxis:  Medical and mechanical DVT prophylaxis orders are present.    CODE STATUS:    Code Status: CPR  Medical Interventions (Level of Support Prior to Arrest): Full      Electronically signed by Brock Tolbert MD, 10/12/21, 12:43 PM EDT.

## 2021-10-12 NOTE — OUTREACH NOTE
Prep Survey      Responses   [facility] facility patient discharged from? [facility]   Is LACE score < 7 ? Yes   Emergency Room discharge w/ pulse ox? No   Eligibility TCM   [facility]   Date of Admission 10/10/21   Date of Discharge 10/12/21   Discharge Disposition Home or Self Care   Discharge diagnosis NSTEMI, heart cath & stent, A-fib    Does the patient have one of the following disease processes/diagnoses(primary or secondary)? Acute MI (STEMI,NSTEMI)   Does the patient have Home health ordered? No   Is there a DME ordered? No   Prep survey completed? Yes          Bernadine Butt RN

## 2021-10-12 NOTE — PLAN OF CARE
Goal Outcome Evaluation:      Pt Dcd home with family     Education done    IV removed    belongings with pt     Pt ambulated to exit with wife

## 2021-10-13 ENCOUNTER — TRANSITIONAL CARE MANAGEMENT TELEPHONE ENCOUNTER (OUTPATIENT)
Dept: CALL CENTER | Facility: HOSPITAL | Age: 71
End: 2021-10-13

## 2021-10-13 ENCOUNTER — TELEPHONE (OUTPATIENT)
Dept: CARDIOLOGY | Facility: CLINIC | Age: 71
End: 2021-10-13

## 2021-10-13 NOTE — OUTREACH NOTE
"Call Center TCM Note      Responses   [facility] patient discharged from? [facility]   Does the patient have one of the following disease processes/diagnoses(primary or secondary)? Acute MI (STEMI,NSTEMI)   TCM attempt successful? Yes   Call start time 1132   Call end time 1150   Discharge diagnosis NSTEMI, heart cath & stent, A-fib    Medication alerts for this patient ASA-PLAVIX-XARELTO   Meds reviewed with patient/caregiver? Yes   Is the patient having any side effects they believe may be caused by any medication additions or changes? No   Does the patient have all prescriptions related to this admission filled (includes statins,anticoagulants,HTN meds,anti-arrhythmia meds) Yes   Is the patient taking all medications as directed (includes completed medication regime)? Yes   Comments regarding appointments See AVS   Does the patient have a primary care provider?  Yes   Does the patient have an appointment with their PCP,cardiologist,or clinic within 7 days of discharge? Yes   Comments regarding PCP Notified patient that office will be notified and to expect a return call for scheduling a visit: no available appointments on  that meet TCM guidelines that can be made per this RN.  Patient may decide to defer TCM f/u and opt to keep his regular appt   Has the patient kept scheduled appointments due by today? N/A   Has home health visited the patient within 72 hours of discharge? N/A   Psychosocial issues? No   Did the patient receive a copy of their discharge instructions? Yes   Nursing interventions Reviewed instructions with patient   What is the patient's perception of their health status since discharge? Same  [Reports he has placed a call to cardiology to report \"chest tightness.\"  Rates #2-3 on rating scale, #8 when he was admitted.  He is very concerned.  He reports some SOA & feels as if \"wants to breath deep.\"  He has no cough, denies palpitations.]   Nursing interventions Nurse provided patient " education   Is the patient/caregiver able to teach back signs and symptoms of when to call for help immediately: Sudden chest discomfort,  Sudden discomfort in arms, back, neck or jaw,  Shortness of breath at any time,  Sudden sweating or clammy skin,  Nausea or vomiting,  Dizziness or lightheadedness,  Irregular or rapid heart rate   Nursing interventions Nurse provided patient education,  Advised patient to call provider   Is the patient/caregiver able to teach back ways to prevent a second heart attack: Take medications,  Follow up with MD   Is the patient/caregiver able to teach back the hierarchy of who to call/visit for symptoms/problems? PCP, Specialist, Home health nurse, Urgent Care, ED, 911 Yes  [Return to ED precautions reviewed]   Additional teach back comments Denies other cardiac related s/s--no dizziness,no pain radiation.  Denies any recent overexertion.  /80, pulse 79. He understands to return to ED if worsens and agrees to do so but is awaiting a call from cardiology--will route concerns.  Patient right radial cath site w/ no s/s infection--no numbness/tingling noted.   TCM call completed? Yes          Haritha Melgoza, MEL    10/13/2021, 11:50 EDT

## 2021-10-13 NOTE — OUTREACH NOTE
Call Center TCM Note      Responses   [facility] patient discharged from? [facility]   Does the patient have one of the following disease processes/diagnoses(primary or secondary)? Acute MI (STEMI,NSTEMI)   TCM attempt successful? No  [verbal release on file for wife]   Unsuccessful attempts Attempt 1  [Spoke with wife who reports pt is sleeping--rescheduled call]          Haritha Melgoza RN    10/13/2021, 08:20 EDT

## 2021-10-13 NOTE — TELEPHONE ENCOUNTER
He should go to the ED for further evaluation if he has chest pain lasting >20-30 minutes.  However, his final angiographic results from his PCI looked great and I suspect there are no acute cardiac issues.  He should continue taking his cardiac medications as directed and follow-up with his cardiologist as previously arranged.

## 2021-10-13 NOTE — TELEPHONE ENCOUNTER
Received VM from patient.    Returned call. Patient stated that he is having some chest pain and SOB that is nonexertional. Confirmed taking Plavix and ASA. Advised it is not uncommon to have some chest pain and SOB post PCI but that I would discuss with Dr. Tolbert and call back with his recommendations.

## 2021-10-14 NOTE — TELEPHONE ENCOUNTER
CPA PT    Needs new standing order for INR sent to [facility] fax; [phone]  
Done.   
Home

## 2021-10-15 LAB
QT INTERVAL: 457 MS
QT INTERVAL: 543 MS

## 2021-10-15 NOTE — PROGRESS NOTES
I spoke with Jada our Nurse Navigator and Dr Tolbert doesn't do TCM appointments. Mr elmore has a follow up on 11/9

## 2021-10-15 NOTE — TELEPHONE ENCOUNTER
Patient aware of Dr. Tolbert's recommendations. Stated that the pain has subsided. Would like to switch to Dr. Tolbert from Dr. Pina. Hosp f/u appt made.

## 2021-10-19 ENCOUNTER — OFFICE VISIT (OUTPATIENT)
Dept: INTERNAL MEDICINE | Facility: CLINIC | Age: 71
End: 2021-10-19

## 2021-10-19 VITALS
SYSTOLIC BLOOD PRESSURE: 126 MMHG | HEART RATE: 70 BPM | HEIGHT: 72 IN | DIASTOLIC BLOOD PRESSURE: 80 MMHG | OXYGEN SATURATION: 97 % | WEIGHT: 177 LBS | BODY MASS INDEX: 23.98 KG/M2 | TEMPERATURE: 96.7 F

## 2021-10-19 DIAGNOSIS — I48.19 OTHER PERSISTENT ATRIAL FIBRILLATION (HCC): ICD-10-CM

## 2021-10-19 DIAGNOSIS — Z23 ENCOUNTER FOR IMMUNIZATION: ICD-10-CM

## 2021-10-19 DIAGNOSIS — I21.4 NSTEMI (NON-ST ELEVATED MYOCARDIAL INFARCTION) (HCC): ICD-10-CM

## 2021-10-19 DIAGNOSIS — I49.5 TACHY-BRADY SYNDROME (HCC): Primary | ICD-10-CM

## 2021-10-19 DIAGNOSIS — Z95.5 S/P CORONARY ARTERY STENT PLACEMENT: ICD-10-CM

## 2021-10-19 DIAGNOSIS — Z95.0 PACEMAKER: ICD-10-CM

## 2021-10-19 DIAGNOSIS — G47.33 OSA ON CPAP: ICD-10-CM

## 2021-10-19 DIAGNOSIS — E03.9 HYPOTHYROIDISM, UNSPECIFIED TYPE: ICD-10-CM

## 2021-10-19 DIAGNOSIS — Z99.89 OSA ON CPAP: ICD-10-CM

## 2021-10-19 DIAGNOSIS — I10 ESSENTIAL HYPERTENSION: ICD-10-CM

## 2021-10-19 DIAGNOSIS — Z79.01 CHRONIC ANTICOAGULATION: ICD-10-CM

## 2021-10-19 DIAGNOSIS — N40.0 BENIGN PROSTATIC HYPERPLASIA WITHOUT LOWER URINARY TRACT SYMPTOMS: ICD-10-CM

## 2021-10-19 DIAGNOSIS — I20.1 ANGINA PECTORIS WITH DOCUMENTED SPASM (HCC): ICD-10-CM

## 2021-10-19 DIAGNOSIS — E78.5 HYPERLIPIDEMIA, UNSPECIFIED HYPERLIPIDEMIA TYPE: ICD-10-CM

## 2021-10-19 LAB
ALBUMIN SERPL-MCNC: 4.9 G/DL (ref 3.5–5.2)
ALBUMIN/GLOB SERPL: 2.2 G/DL
ALP SERPL-CCNC: 39 U/L (ref 39–117)
ALT SERPL W P-5'-P-CCNC: 18 U/L (ref 1–41)
ANION GAP SERPL CALCULATED.3IONS-SCNC: 12.4 MMOL/L (ref 5–15)
AST SERPL-CCNC: 29 U/L (ref 1–40)
BILIRUB SERPL-MCNC: 0.7 MG/DL (ref 0–1.2)
BUN SERPL-MCNC: 15 MG/DL (ref 8–23)
BUN/CREAT SERPL: 13.9 (ref 7–25)
CALCIUM SPEC-SCNC: 9.3 MG/DL (ref 8.6–10.5)
CHLORIDE SERPL-SCNC: 104 MMOL/L (ref 98–107)
CO2 SERPL-SCNC: 24.6 MMOL/L (ref 22–29)
CREAT SERPL-MCNC: 1.08 MG/DL (ref 0.76–1.27)
GFR SERPL CREATININE-BSD FRML MDRD: 67 ML/MIN/1.73
GLOBULIN UR ELPH-MCNC: 2.2 GM/DL
GLUCOSE SERPL-MCNC: 84 MG/DL (ref 65–99)
POTASSIUM SERPL-SCNC: 4.5 MMOL/L (ref 3.5–5.2)
PROT SERPL-MCNC: 7.1 G/DL (ref 6–8.5)
SODIUM SERPL-SCNC: 141 MMOL/L (ref 136–145)

## 2021-10-19 PROCEDURE — 99496 TRANSJ CARE MGMT HIGH F2F 7D: CPT | Performed by: STUDENT IN AN ORGANIZED HEALTH CARE EDUCATION/TRAINING PROGRAM

## 2021-10-19 PROCEDURE — 80053 COMPREHEN METABOLIC PANEL: CPT | Performed by: STUDENT IN AN ORGANIZED HEALTH CARE EDUCATION/TRAINING PROGRAM

## 2021-10-19 PROCEDURE — 36415 COLL VENOUS BLD VENIPUNCTURE: CPT | Performed by: STUDENT IN AN ORGANIZED HEALTH CARE EDUCATION/TRAINING PROGRAM

## 2021-10-19 PROCEDURE — 90686 IIV4 VACC NO PRSV 0.5 ML IM: CPT | Performed by: STUDENT IN AN ORGANIZED HEALTH CARE EDUCATION/TRAINING PROGRAM

## 2021-10-19 PROCEDURE — G0008 ADMIN INFLUENZA VIRUS VAC: HCPCS | Performed by: STUDENT IN AN ORGANIZED HEALTH CARE EDUCATION/TRAINING PROGRAM

## 2021-10-19 PROCEDURE — 1111F DSCHRG MED/CURRENT MED MERGE: CPT | Performed by: STUDENT IN AN ORGANIZED HEALTH CARE EDUCATION/TRAINING PROGRAM

## 2021-10-19 NOTE — PROGRESS NOTES
Transitional Care Follow Up Visit  Subjective     Victor Manuel Mendoza is a 71 y.o. male who presents for a transitional care management visit.    Within 48 business hours after discharge our office contacted him via telephone to coordinate his care and needs.      I reviewed and discussed the details of that call along with the discharge summary, hospital problems, inpatient lab results, inpatient diagnostic studies, and consultation reports with Victor Manuel.     Current outpatient and discharge medications have been reconciled for the patient.  Reviewed by: Ciro Bland MD      Date of TCM Phone Call 10/12/2021   [facility]   Date of Admission 10/10/2021   Date of Discharge 10/12/2021   Discharge Disposition Home or Self Care     Risk for Readmission (LACE) Score: 3 (10/12/2021  6:00 AM)      History of Present Illness   Course During Hospital Stay:      Admitted 10/10/2021 to 10/12/2021 with chest pain.  Found to have NSTEMI.  Now s/p PCI to distal LAD and PL banch of RCA with BURKE (10/11/2021).  Discharged on 2 weeks of ASA/Plavix.  Transitioned from warfarin to xarelto (on anticoagulation for A fib)..  Plan is to continue plavix x 1 year.  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  Of irbesartan pending evaluation today.    Also of note, having issues tolerating cpap since discharge (only could wear it 1.8 hrs last night).    Seen by ortho (Dr. Butt in [place]) since discharge for right shoulder pain.  Received steroid injection yesterday.     The following portions of the patient's history were reviewed and updated as appropriate: allergies, current medications, past family history, past medical history, past social history, past surgical history and problem list.        Review of Systems    Objective   Physical Exam  Constitutional:       General: He is not in acute distress.     Appearance: Normal appearance. He is normal weight.   HENT:      Head: Normocephalic and  atraumatic.   Eyes:      Conjunctiva/sclera: Conjunctivae normal.   Cardiovascular:      Rate and Rhythm: Normal rate and regular rhythm.      Pulses: Normal pulses.      Heart sounds: No murmur heard.       Comments: Palpable subcutaneous device left upper chest near clavicle  Pulmonary:      Effort: Pulmonary effort is normal.      Breath sounds: Normal breath sounds. No wheezing.   Abdominal:      General: Abdomen is flat.      Palpations: Abdomen is soft.   Musculoskeletal:      Right lower leg: No edema.      Left lower leg: No edema.   Skin:     General: Skin is warm and dry.   Neurological:      General: No focal deficit present.      Mental Status: He is alert. Mental status is at baseline.   Psychiatric:         Mood and Affect: Mood normal.         Behavior: Behavior normal.         Thought Content: Thought content normal.         Judgment: Judgment normal.         Assessment/Plan   Diagnoses and all orders for this visit:    1. Tachy-staci syndrome (HCC) (Primary)    2. Other persistent atrial fibrillation (HCC)    3. Angina pectoris with documented spasm (HCC)    4. NSTEMI (non-ST elevated myocardial infarction) (HCC)  -     Comprehensive Metabolic Panel    5. S/P coronary artery stent placement    6. Essential hypertension  -     Comprehensive Metabolic Panel    7. Hyperlipidemia, unspecified hyperlipidemia type    8. Benign prostatic hyperplasia without lower urinary tract symptoms    9. Pacemaker    10. Chronic anticoagulation    11. Hypothyroidism, unspecified type    12. HODAN on CPAP    13. Encounter for immunization    Other orders  -     FluLaval/Fluarix/Fluzone >6 Months (0862-0029)      HTN:  -will check labs, and if renal and electrolytes appropriate will re-start BP med       Immunization:  -Discussed risks/benefits to vaccination, reviewed components of the vaccine, discussed VIS, discussed informed consent, informed consent obtained. Patient/Parent was allowed to accept or refuse vaccine.  Questions answered to satisfactory state of patient/parent. We reviewed typical age appropriate and seasonally appropriate vaccinations. Reviewed immunization history and updated state vaccination form as needed. Patient/Parent was counseled on the above vaccines.

## 2021-10-19 NOTE — PROGRESS NOTES
"Chief Complaint  Follow-up (stents and pacemaker placed)    Subjective     {Problem List  Visit Diagnosis   Encounters  Notes  Medications  Labs  Result Review Imaging  Media :23}     Victor Manuel Mendoza presents to [facility]  History of Present Illness    Objective   Vital Signs:   /97 (BP Location: Left arm, Patient Position: Sitting, Cuff Size: Adult)   Pulse 70   Temp 96.7 °F (35.9 °C) (Temporal)   Ht 182.9 cm (72\")   Wt 80.3 kg (177 lb)   SpO2 97%   BMI 24.01 kg/m²     Physical Exam   Result Review :{Labs  Result Review  Imaging  Med Tab  Media  Procedures :23}   {The following data was reviewed by (Optional):59600}  {Ambulatory Labs (Optional):27592}  {Data reviewed (Optional):25092:::1}          Assessment and Plan {CC Problem List  Visit Diagnosis   ROS  Review (Popup)  Health Maintenance  Quality  BestPractice  Medications  SmartSets  SnapShot Encounters  Media :23}   There are no diagnoses linked to this encounter.  {Time Spent (Optional):77965}  Follow Up {Instructions Charge Capture  Follow-up Communications :23}  No follow-ups on file.  Patient was given instructions and counseling regarding his condition or for health maintenance advice. Please see specific information pulled into the AVS if appropriate.       "

## 2021-10-19 NOTE — PATIENT INSTRUCTIONS
Influenza (Flu) Vaccine (Inactivated or Recombinant): What You Need to Know  1. Why get vaccinated?  Influenza vaccine can prevent influenza (flu).  Flu is a contagious disease that spreads around the [place] every year, usually between October and May. Anyone can get the flu, but it is more dangerous for some people. Infants and young children, people 65 years of age and older, pregnant women, and people with certain health conditions or a weakened immune system are at greatest risk of flu complications.  Pneumonia, bronchitis, sinus infections and ear infections are examples of flu-related complications. If you have a medical condition, such as heart disease, cancer or diabetes, flu can make it worse.  Flu can cause fever and chills, sore throat, muscle aches, fatigue, cough, headache, and runny or stuffy nose. Some people may have vomiting and diarrhea, though this is more common in children than adults.  Each year thousands of people in the [place] die from flu, and many more are hospitalized. Flu vaccine prevents millions of illnesses and flu-related visits to the doctor each year.  2. Influenza vaccine  CDC recommends everyone 6 months of age and older get vaccinated every flu season. Children 6 months through 8 years of age may need 2 doses during a single flu season. Everyone else needs only 1 dose each flu season.  It takes about 2 weeks for protection to develop after vaccination.  There are many flu viruses, and they are always changing. Each year a new flu vaccine is made to protect against three or four viruses that are likely to cause disease in the upcoming flu season. Even when the vaccine doesn't exactly match these viruses, it may still provide some protection.  Influenza vaccine does not cause flu.  Influenza vaccine may be given at the same time as other vaccines.  3. Talk with your health care provider  Tell your vaccine provider if the person getting the vaccine:  · Has had an  allergic reaction after a previous dose of influenza vaccine, or has any severe, life-threatening allergies.  · Has ever had Guillain-Barré Syndrome (also called GBS).  In some cases, your health care provider may decide to postpone influenza vaccination to a future visit.  People with minor illnesses, such as a cold, may be vaccinated. People who are moderately or severely ill should usually wait until they recover before getting influenza vaccine.  Your health care provider can give you more information.  4. Risks of a vaccine reaction  · Soreness, redness, and swelling where shot is given, fever, muscle aches, and headache can happen after influenza vaccine.  · There may be a very small increased risk of Guillain-Barré Syndrome (GBS) after inactivated influenza vaccine (the flu shot).  Young children who get the flu shot along with pneumococcal vaccine (PCV13), and/or DTaP vaccine at the same time might be slightly more likely to have a seizure caused by fever. Tell your health care provider if a child who is getting flu vaccine has ever had a seizure.  People sometimes faint after medical procedures, including vaccination. Tell your provider if you feel dizzy or have vision changes or ringing in the ears.  As with any medicine, there is a very remote chance of a vaccine causing a severe allergic reaction, other serious injury, or death.  5. What if there is a serious problem?  An allergic reaction could occur after the vaccinated person leaves the clinic. If you see signs of a severe allergic reaction (hives, swelling of the face and throat, difficulty breathing, a fast heartbeat, dizziness, or weakness), call 9-1-1 and get the person to the nearest hospital.  For other signs that concern you, call your health care provider.  Adverse reactions should be reported to the Vaccine Adverse Event Reporting System (VAERS). Your health care provider will usually file this report, or you can do it yourself. Visit the  VAERS website at www.vaers.hhs.gov or call 1-779.888.1831. VAERS is only for reporting reactions, and VAERS staff do not give medical advice.  6. The National Vaccine Injury Compensation Program  The National Vaccine Injury Compensation Program (VICP) is a federal program that was created to compensate people who may have been injured by certain vaccines. Visit the VICP website at www.[facility].gov/vaccinecompensation or call 1-135.571.6289 to learn about the program and about filing a claim. There is a time limit to file a claim for compensation.  7. How can I learn more?  · Ask your healthcare provider.  · Call your local or state health department.  · Contact the Centers for Disease Control and Prevention (CDC):  ? Call 1-513.955.2635 (0-211-JPX-INFO) or  ? Visit CDC's www.cdc.gov/flu  Vaccine Information Statement (Interim) Inactivated Influenza Vaccine (8/15/2019)  This information is not intended to replace advice given to you by your health care provider. Make sure you discuss any questions you have with your health care provider.  Document Revised: 12/09/2020 Document Reviewed: 12/09/2020  [employer] Patient Education © 2021 [employer]    Cooking With Less Salt  Cooking with less salt is one way to reduce the amount of sodium you get from food. Sodium is one of the elements that make up salt. It is found naturally in foods and is also added to certain foods. Depending on your condition and overall health, your health care provider or dietitian may recommend that you reduce your sodium intake. Most people should have less than 2,300 milligrams (mg) of sodium each day. If you have high blood pressure (hypertension), you may need to limit your sodium to 1,500 mg each day. Follow the tips below to help reduce your sodium intake.  What are tips for eating less sodium?  Reading food labels       · Check the food label before buying or using packaged ingredients. Always check the label for the serving size and sodium  content.  · Look for products with no more than 140 mg of sodium in one serving.  · Check the % Daily Value column to see what percent of the daily recommended amount of sodium is provided in one serving of the product. Foods with 5% or less in this column are considered low in sodium. Foods with 20% or higher are considered high in sodium.  · Do not choose foods with salt as one of the first three ingredients on the ingredients list. If salt is one of the first three ingredients, it usually means the item is high in sodium.     Shopping  1. Buy sodium-free or low-sodium products. Look for the following words on food labels:  ? Low-sodium.  ? Sodium-free.  ? Reduced-sodium.  ? No salt added.  ? Unsalted.  2. Always check the sodium content even if foods are labeled as low-sodium or no salt added.  3. Buy fresh foods.  Cooking  · Use herbs, seasonings without salt, and spices as substitutes for salt.  · Use sodium-free baking soda when baking.  · [place], braise, or roast foods to add flavor with less salt.  · Avoid adding salt to pasta, rice, or hot cereals.  · Drain and rinse canned vegetables, beans, and meat before use.  · Avoid adding salt when cooking sweets and desserts.  · Cook with low-sodium ingredients.  What foods are high in sodium?  Vegetables  Regular canned vegetables (not low-sodium or reduced-sodium). Sauerkraut, pickled vegetables, and relishes. Olives. French fries. Onion rings. Regular canned tomato sauce and paste. Regular tomato and vegetable juice. Frozen vegetables in sauces.  Grains  Instant hot cereals. Bread stuffing, pancake, and biscuit mixes. Croutons. Seasoned rice or pasta mixes. Noodle soup cups. Boxed or frozen macaroni and cheese. Regular salted crackers. Self-rising flour. Rolls. Bagels. Flour tortillas and wraps.  Meats and other proteins  Meat or fish that is salted, canned, smoked, cured, spiced, or pickled. This includes [place], ham, sausages, hot dogs, corned beef, chipped beef,  "meat loaves, salt pork, jerky, pickled herring, anchovies, regular canned tuna, and sardines. Salted nuts.  Dairy  Processed cheese and cheese spreads. Cheese curds. Blue cheese. Feta cheese. String cheese. Regular cottage cheese. Buttermilk. Canned milk.  The items listed above may not be a complete list of foods high in sodium. Actual amounts of sodium may be different depending on processing. Contact a dietitian for more information.  What foods are low in sodium?  Fruits  Fresh, frozen, or canned fruit with no sauce added. Fruit juice.  Vegetables  Fresh or frozen vegetables with no sauce added. \"No salt added\" canned vegetables. \"No salt added\" tomato sauce and paste. Low-sodium or reduced-sodium tomato and vegetable juice.  Grains  Noodles, pasta, quinoa, rice. Shredded or puffed wheat or puffed rice. Regular or quick oats (not instant). Low-sodium crackers. Low-sodium bread. Whole-grain bread and whole-grain pasta. Unsalted popcorn.  Meats and other proteins  Fresh or frozen whole meats, poultry (not injected with sodium), and fish with no sauce added. Unsalted nuts. Dried peas, beans, and lentils without added salt. Unsalted canned beans. Eggs. Unsalted nut butters. Low-sodium canned tuna or chicken.  Dairy  Milk. Soy milk. Yogurt. Low-sodium cheeses, such as Swiss, [place], mozzarella, and ricotta. Sherbet or ice cream (keep to ½ cup per serving). Cream cheese.  Fats and oils  Unsalted butter or margarine.  Other foods  Homemade pudding. Sodium-free baking soda and baking powder. Herbs and spices. Low-sodium seasoning mixes.  Beverages  Coffee and tea. Carbonated beverages.  The items listed above may not be a complete list of foods low in sodium. Actual amounts of sodium may be different depending on processing. Contact a dietitian for more information.  What are some salt alternatives when cooking?  The following are herbs, seasonings, and spices that can be used instead of salt to flavor your food. " Herbs should be fresh or dried. Do not choose packaged mixes. Next to the name of the herb, spice, or seasoning are some examples of foods you can pair it with.  Herbs  · Bay leaves - Soups, meat and vegetable dishes, and spaghetti sauce.  · Basil - Italian dishes, soups, pasta, and fish dishes.  · Cilantro - Meat, poultry, and vegetable dishes.  · Chili powder - Marinades and Mexican dishes.  · Chives - Salad dressings and potato dishes.  · Cumin - Mexican dishes, couscous, and meat dishes.  · Dill - Fish dishes, sauces, and salads.  · Fennel - Meat and vegetable dishes, breads, and cookies.  · Garlic (do not use garlic salt) - Italian dishes, meat dishes, salad dressings, and sauces.  · Marjoram - Soups, potato dishes, and meat dishes.  · Oregano - Pizza and spaghetti sauce.  · Parsley - Salads, soups, pasta, and meat dishes.  · Lydia - Italian dishes, salad dressings, soups, and red meats.  · Saffron - Fish dishes, pasta, and some poultry dishes.  · Lalit - Stuffings and sauces.  · Tarragon - Fish and poultry dishes.  · Thyme - Stuffing, meat, and fish dishes.  Seasonings  · Lemon juice - Fish dishes, poultry dishes, vegetables, and salads.  · Vinegar - Salad dressings, vegetables, and fish dishes.  Spices  · Cinnamon - Sweet dishes, such as cakes, cookies, and puddings.  · Cloves - Gingerbread, puddings, and marinades for meats.  · Cadena - Vegetable dishes, fish and poultry dishes, and stir-hayden dishes.  · [place] - Vegetable dishes, fish dishes, and stir-hayden dishes.  · Nutmeg - Pasta, vegetables, poultry, fish dishes, and custard.  Summary  · Cooking with less salt is one way to reduce the amount of sodium that you get from food.  · Buy sodium-free or low-sodium products.  · Check the food label before using or buying packaged ingredients.  · Use herbs, seasonings without salt, and spices as substitutes for salt in foods.  This information is not intended to replace advice given to you by your health care  "provider. Make sure you discuss any questions you have with your health care provider.  Document Revised: 12/09/2020 Document Reviewed: 12/09/2020  [employer] Patient Education © 2021 [employer]      https://www.nhlbi.nih.gov/files/docs/public/heart/dash_brief.pdf\">   DASH Eating Plan  DASH stands for Dietary Approaches to Stop Hypertension. The DASH eating plan is a healthy eating plan that has been shown to:  · Reduce high blood pressure (hypertension).  · Reduce your risk for type 2 diabetes, heart disease, and stroke.  · Help with weight loss.  What are tips for following this plan?  Reading food labels  · Check food labels for the amount of salt (sodium) per serving. Choose foods with less than 5 percent of the Daily Value of sodium. Generally, foods with less than 300 milligrams (mg) of sodium per serving fit into this eating plan.  · To find whole grains, look for the word \"whole\" as the first word in the ingredient list.  Shopping  · Buy products labeled as \"low-sodium\" or \"no salt added.\"  · Buy fresh foods. Avoid canned foods and pre-made or frozen meals.  Cooking  · Avoid adding salt when cooking. Use salt-free seasonings or herbs instead of table salt or sea salt. Check with your health care provider or pharmacist before using salt substitutes.  · Do not hayden foods. Cook foods using healthy methods such as baking, boiling, grilling, roasting, and broiling instead.  · Cook with heart-healthy oils, such as olive, canola, avocado, soybean, or sunflower oil.  Meal planning       1. Eat a balanced diet that includes:  ? 4 or more servings of fruits and 4 or more servings of vegetables each day. Try to fill one-half of your plate with fruits and vegetables.  ? 6-8 servings of whole grains each day.  ? Less than 6 oz (170 g) of lean meat, poultry, or fish each day. A 3-oz (85-g) serving of meat is about the same size as a deck of cards. One egg equals 1 oz (28 g).  ? 2-3 servings of low-fat dairy each day. One " serving is 1 cup (237 mL).  ? 1 serving of nuts, seeds, or beans 5 times each week.  ? 2-3 servings of heart-healthy fats. Healthy fats called omega-3 fatty acids are found in foods such as walnuts, flaxseeds, fortified milks, and eggs. These fats are also found in cold-water fish, such as sardines, salmon, and mackerel.  2. Limit how much you eat of:  ? Canned or prepackaged foods.  ? Food that is high in trans fat, such as some fried foods.  ? Food that is high in saturated fat, such as fatty meat.  ? Desserts and other sweets, sugary drinks, and other foods with added sugar.  ? Full-fat dairy products.  3. Do not salt foods before eating.  4. Do not eat more than 4 egg yolks a week.  5. Try to eat at least 2 vegetarian meals a week.  6. Eat more home-cooked food and less restaurant, buffet, and fast food.     Lifestyle  1. When eating at a restaurant, ask that your food be prepared with less salt or no salt, if possible.  2. If you drink alcohol:  1. Limit how much you use to:  § 0-1 drink a day for women who are not pregnant.  § 0-2 drinks a day for men.  2. Be aware of how much alcohol is in your drink. In the [place] one drink equals one 12 oz bottle of beer (355 mL), one 5 oz glass of wine (148 mL), or one 1½ oz glass of hard liquor (44 mL).  General information  · Avoid eating more than 2,300 mg of salt a day. If you have hypertension, you may need to reduce your sodium intake to 1,500 mg a day.  · Work with your health care provider to maintain a healthy body weight or to lose weight. Ask what an ideal weight is for you.  · Get at least 30 minutes of exercise that causes your heart to beat faster (aerobic exercise) most days of the week. Activities may include walking, swimming, or biking.  · Work with your health care provider or dietitian to adjust your eating plan to your individual calorie needs.  What foods should I eat?  Fruits  All fresh, dried, or frozen fruit. Canned fruit in natural juice (without  added sugar).  Vegetables  Fresh or frozen vegetables (raw, steamed, roasted, or grilled). Low-sodium or reduced-sodium tomato and vegetable juice. Low-sodium or reduced-sodium tomato sauce and tomato paste. Low-sodium or reduced-sodium canned vegetables.  Grains  Whole-grain or whole-wheat bread. Whole-grain or whole-wheat pasta. Brown rice. Oatmeal. Quinoa. Bulgur. Whole-grain and low-sodium cereals. Bernie bread. Low-fat, low-sodium crackers. Whole-wheat flour tortillas.  Meats and other proteins  Skinless chicken or turkey. Ground chicken or turkey. Pork with fat trimmed off. Fish and seafood. Egg whites. Dried beans, peas, or lentils. Unsalted nuts, nut butters, and seeds. Unsalted canned beans. Lean cuts of beef with fat trimmed off. Low-sodium, lean precooked or cured meat, such as sausages or meat loaves.  Dairy  Low-fat (1%) or fat-free (skim) milk. Reduced-fat, low-fat, or fat-free cheeses. Nonfat, low-sodium ricotta or cottage cheese. Low-fat or nonfat yogurt. Low-fat, low-sodium cheese.  Fats and oils  Soft margarine without trans fats. Vegetable oil. Reduced-fat, low-fat, or light mayonnaise and salad dressings (reduced-sodium). Canola, safflower, olive, avocado, soybean, and sunflower oils. Avocado.  Seasonings and condiments  Herbs. Spices. Seasoning mixes without salt.  Other foods  Unsalted popcorn and pretzels. Fat-free sweets.  The items listed above may not be a complete list of foods and beverages you can eat. Contact a dietitian for more information.  What foods should I avoid?  Fruits  Canned fruit in a light or heavy syrup. Fried fruit. Fruit in cream or butter sauce.  Vegetables  Creamed or fried vegetables. Vegetables in a cheese sauce. Regular canned vegetables (not low-sodium or reduced-sodium). Regular canned tomato sauce and paste (not low-sodium or reduced-sodium). Regular tomato and vegetable juice (not low-sodium or reduced-sodium). Pickles. Olives.  Grains  Baked goods made with fat,  such as croissants, muffins, or some breads. Dry pasta or rice meal packs.  Meats and other proteins  Fatty cuts of meat. Ribs. Fried meat. [place]. Bologna, salami, and other precooked or cured meats, such as sausages or meat loaves. Fat from the back of a pig (fatback). Bratwurst. Salted nuts and seeds. Canned beans with added salt. Canned or smoked fish. Whole eggs or egg yolks. Chicken or turkey with skin.  Dairy  Whole or 2% milk, cream, and half-and-half. Whole or full-fat cream cheese. Whole-fat or sweetened yogurt. Full-fat cheese. Nondairy creamers. Whipped toppings. Processed cheese and cheese spreads.  Fats and oils  Butter. Stick margarine. Lard. Shortening. Ghee. [place] fat. Tropical oils, such as coconut, palm kernel, or palm oil.  Seasonings and condiments  Onion salt, garlic salt, seasoned salt, table salt, and sea salt. Worcestershire sauce. Tartar sauce. Barbecue sauce. Teriyaki sauce. Soy sauce, including reduced-sodium. Steak sauce. Canned and packaged gravies. Fish sauce. Oyster sauce. Cocktail sauce. Store-bought horseradish. Ketchup. Mustard. Meat flavorings and tenderizers. Bouillon cubes. Hot sauces. Pre-made or packaged marinades. Pre-made or packaged taco seasonings. Relishes. Regular salad dressings.  Other foods  Salted popcorn and pretzels.  The items listed above may not be a complete list of foods and beverages you should avoid. Contact a dietitian for more information.  Where to find more information  · National Heart, Lung, and Blood [place]: www.nhlbi.nih.gov  · American Heart Association: www.heart.org  · Academy of Nutrition and Dietetics: www.eatright.org  · National Kidney Foundation: www.kidney.org  Summary  · The DASH eating plan is a healthy eating plan that has been shown to reduce high blood pressure (hypertension). It may also reduce your risk for type 2 diabetes, heart disease, and stroke.  · When on the DASH eating plan, aim to eat more fresh fruits and vegetables, whole  grains, lean proteins, low-fat dairy, and heart-healthy fats.  · With the DASH eating plan, you should limit salt (sodium) intake to 2,300 mg a day. If you have hypertension, you may need to reduce your sodium intake to 1,500 mg a day.  · Work with your health care provider or dietitian to adjust your eating plan to your individual calorie needs.  This information is not intended to replace advice given to you by your health care provider. Make sure you discuss any questions you have with your health care provider.  Document Revised: 11/20/2020 Document Reviewed: 11/20/2020  [employer] Patient Education © 2021 [employer].       Heart-Healthy Eating Plan  Heart-healthy meal planning includes:  · Eating less unhealthy fats.  · Eating more healthy fats.  · Making other changes in your diet.  Talk with your doctor or a diet specialist (dietitian) to create an eating plan that is right for you.  What is my plan?  Your doctor may recommend an eating plan that includes:  · Total fat: ______% or less of total calories a day.  · Saturated fat: ______% or less of total calories a day.  · Cholesterol: less than _________mg a day.  What are tips for following this plan?  Cooking  Avoid frying your food. Try to bake, boil, grill, or broil it instead. You can also reduce fat by:  · Removing the skin from poultry.  · Removing all visible fats from meats.  · Steaming vegetables in water or broth.  Meal planning       1. At meals, divide your plate into four equal parts:  ? Fill one-half of your plate with vegetables and green salads.  ? Fill one-fourth of your plate with whole grains.  ? Fill one-fourth of your plate with lean protein foods.  2. Eat 4-5 servings of vegetables per day. A serving of vegetables is:  ? 1 cup of raw or cooked vegetables.  ? 2 cups of raw leafy greens.  3. Eat 4-5 servings of fruit per day. A serving of fruit is:  ? 1 medium whole fruit.  ? ¼ cup of dried fruit.  ? ½ cup of fresh, frozen, or canned  fruit.  ? ½ cup of 100% fruit juice.  4. Eat more foods that have soluble fiber. These are apples, broccoli, carrots, beans, peas, and barley. Try to get 20-30 g of fiber per day.  5. Eat 4-5 servings of nuts, legumes, and seeds per week:  ? 1 serving of dried beans or legumes equals ½ cup after being cooked.  ? 1 serving of nuts is ¼ cup.  ? 1 serving of seeds equals 1 tablespoon.     General information  · Eat more home-cooked food. Eat less restaurant, buffet, and fast food.  · Limit or avoid alcohol.  · Limit foods that are high in starch and sugar.  · Avoid fried foods.  · Lose weight if you are overweight.  · Keep track of how much salt (sodium) you eat. This is important if you have high blood pressure. Ask your doctor to tell you more about this.  · Try to add vegetarian meals each week.  Fats  1. Choose healthy fats. These include olive oil and canola oil, flaxseeds, walnuts, almonds, and seeds.  2. Eat more omega-3 fats. These include salmon, mackerel, sardines, tuna, flaxseed oil, and ground flaxseeds. Try to eat fish at least 2 times each week.  3. Check food labels. Avoid foods with trans fats or high amounts of saturated fat.  4. Limit saturated fats.  ? These are often found in animal products, such as meats, butter, and cream.  ? These are also found in plant foods, such as palm oil, palm kernel oil, and coconut oil.  5. Avoid foods with partially hydrogenated oils in them. These have trans fats. Examples are stick margarine, some tub margarines, cookies, crackers, and other baked goods.  What foods can I eat?  Fruits  All fresh, canned (in natural juice), or frozen fruits.  Vegetables  Fresh or frozen vegetables (raw, steamed, roasted, or grilled). Green salads.  Grains  Most grains. Choose whole wheat and whole grains most of the time. Rice and pasta, including brown rice and pastas made with whole wheat.  Meats and other proteins  Lean, well-trimmed beef, veal, pork, and lamb. Chicken and turkey  without skin. All fish and shellfish. Wild duck, rabbit, pheasant, and venison. Egg whites or low-cholesterol egg substitutes. Dried beans, peas, lentils, and tofu. Seeds and most nuts.  Dairy  Low-fat or nonfat cheeses, including ricotta and mozzarella. Skim or 1% milk that is liquid, powdered, or evaporated. Buttermilk that is made with low-fat milk. Nonfat or low-fat yogurt.  Fats and oils  Non-hydrogenated (trans-free) margarines. Vegetable oils, including soybean, sesame, sunflower, olive, peanut, safflower, corn, canola, and cottonseed. Salad dressings or mayonnaise made with a vegetable oil.  Beverages  Mineral water. Coffee and tea. Diet carbonated beverages.  Sweets and desserts  Sherbet, gelatin, and fruit ice. Small amounts of dark chocolate.  Limit all sweets and desserts.  Seasonings and condiments  All seasonings and condiments.  The items listed above may not be a complete list of foods and drinks you can eat. Contact a dietitian for more options.  What foods should I avoid?  Fruits  Canned fruit in heavy syrup. Fruit in cream or butter sauce. Fried fruit. Limit coconut.  Vegetables  Vegetables cooked in cheese, cream, or butter sauce. Fried vegetables.  Grains  Breads that are made with saturated or trans fats, oils, or whole milk. Croissants. Sweet rolls. Donuts. High-fat crackers, such as cheese crackers.  Meats and other proteins  Fatty meats, such as hot dogs, ribs, sausage, fitzgerald, rib-eye roast or steak. High-fat deli meats, such as salami and bologna. Caviar. Domestic duck and goose. Organ meats, such as liver.  Dairy  Cream, sour cream, cream cheese, and creamed cottage cheese. Whole-milk cheeses. Whole or 2% milk that is liquid, evaporated, or condensed. Whole buttermilk. Cream sauce or high-fat cheese sauce. Yogurt that is made from whole milk.  Fats and oils  Meat fat, or shortening. Cocoa butter, hydrogenated oils, palm oil, coconut oil, palm kernel oil. Solid fats and shortenings,  including fitzgerald fat, salt pork, lard, and butter. Nondairy cream substitutes. Salad dressings with cheese or sour cream.  Beverages  Regular sodas and juice drinks with added sugar.  Sweets and desserts  Frosting. Pudding. Cookies. Cakes. Pies. Milk chocolate or white chocolate. Buttered syrups. Full-fat ice cream or ice cream drinks.  The items listed above may not be a complete list of foods and drinks to avoid. Contact a dietitian for more information.  Summary  · Heart-healthy meal planning includes eating less unhealthy fats, eating more healthy fats, and making other changes in your diet.  · Eat a balanced diet. This includes fruits and vegetables, low-fat or nonfat dairy, lean protein, nuts and legumes, whole grains, and heart-healthy oils and fats.  This information is not intended to replace advice given to you by your health care provider. Make sure you discuss any questions you have with your health care provider.  Document Revised: 02/21/2019 Document Reviewed: 01/25/2019  [employer] Patient Education © 2021 [employer]       Mediterranean Diet  A Mediterranean diet refers to food and lifestyle choices that are based on the traditions of countries located on the [place]. This way of eating has been shown to help prevent certain conditions and improve outcomes for people who have chronic diseases, like kidney disease and heart disease.  What are tips for following this plan?  Lifestyle  1. Cook and eat meals together with your family, when possible.  2. Drink enough fluid to keep your urine clear or pale yellow.  3. Be physically active every day. This includes:  ? Aerobic exercise like running or swimming.  ? Leisure activities like gardening, walking, or housework.  4. Get 7-8 hours of sleep each night.  5. If recommended by your health care provider, drink red wine in moderation. This means 1 glass a day for nonpregnant women and 2 glasses a day for men. A glass of wine equals 5 oz (150  mL).  Reading food labels       · Check the serving size of packaged foods. For foods such as rice and pasta, the serving size refers to the amount of cooked product, not dry.  · Check the total fat in packaged foods. Avoid foods that have saturated fat or trans fats.  · Check the ingredients list for added sugars, such as corn syrup.     Shopping  1. At the grocery store, buy most of your food from the areas near the walls of the store. This includes:  ? Fresh fruits and vegetables (produce).  ? Grains, beans, nuts, and seeds. Some of these may be available in unpackaged forms or large amounts (in bulk).  ? Fresh seafood.  ? Poultry and eggs.  ? Low-fat dairy products.  2. Buy whole ingredients instead of prepackaged foods.  3. Buy fresh fruits and vegetables in-season from local [employer] markets.  4. Buy frozen fruits and vegetables in resealable bags.  5. If you do not have access to quality fresh seafood, buy precooked frozen shrimp or canned fish, such as tuna, salmon, or sardines.  6. Buy small amounts of raw or cooked vegetables, salads, or olives from the deli or salad bar at your store.  7. Stock your pantry so you always have certain foods on hand, such as olive oil, canned tuna, canned tomatoes, rice, pasta, and beans.  Cooking  · Cook foods with extra-virgin olive oil instead of using butter or other vegetable oils.  · Have meat as a side dish, and have vegetables or grains as your main dish. This means having meat in small portions or adding small amounts of meat to foods like pasta or stew.  · Use beans or vegetables instead of meat in common dishes like chili or lasagna.  · [place] with different cooking methods. Try roasting or broiling vegetables instead of steaming or sautéeing them.  · Add frozen vegetables to soups, stews, pasta, or rice.  · Add nuts or seeds for added healthy fat at each meal. You can add these to yogurt, salads, or vegetable dishes.  · Marinate fish or vegetables using olive  oil, lemon juice, garlic, and fresh herbs.  Meal planning       1. Plan to eat 1 vegetarian meal one day each week. Try to work up to 2 vegetarian meals, if possible.  2. Eat seafood 2 or more times a week.  3. Have healthy snacks readily available, such as:  ? Vegetable sticks with hummus.  ? Greek yogurt.  ? Fruit and nut trail mix.  4. Eat balanced meals throughout the week. This includes:  ? Fruit: 2-3 servings a day  ? Vegetables: 4-5 servings a day  ? Low-fat dairy: 2 servings a day  ? Fish, poultry, or lean meat: 1 serving a day  ? Beans and legumes: 2 or more servings a week  ? Nuts and seeds: 1-2 servings a day  ? Whole grains: 6-8 servings a day  ? Extra-virgin olive oil: 3-4 servings a day  5. Limit red meat and sweets to only a few servings a month     What are my food choices?  1. Mediterranean diet  1. Recommended  § Grains: Whole-grain pasta. Brown rice. Bulgar wheat. Polenta. Couscous. Whole-wheat bread. Oatmeal. Quinoa.  § Vegetables: Artichokes. Beets. Broccoli. Cabbage. Carrots. Eggplant. Green beans. Chard. Kale. Spinach. Onions. Leeks. Peas. Squash. Tomatoes. Peppers. Radishes.  § Fruits: Apples. Apricots. Avocado. Berries. Bananas. Cherries. Dates. Figs. Grapes. [place]. Melon. Oranges. Peaches. Plums. Pomegranate.  § Meats and other protein foods: Beans. Almonds. Sunflower seeds. Pine nuts. Peanuts. Cod. [place]. Scallops. Shrimp. Tuna. Tilapia. Clams. Oysters. Eggs.  § Dairy: Low-fat milk. Cheese. Greek yogurt.  § Beverages: Water. Red wine. Herbal tea.  § Fats and oils: Extra virgin olive oil. Avocado oil. Grape seed oil.  § Sweets and desserts: Greek yogurt with honey. Baked apples. Poached pears. Trail mix.  § Seasoning and other foods: Basil. Cilantro. Coriander. Cumin. Mint. Parsley. Lalit. Rosemary. Tarragon. Garlic. Oregano. Thyme. Pepper. Balsalmic vinegar. Tahini. Hummus. Tomato sauce. Olives. Mushrooms.  2. Limit these  § Grains: Prepackaged pasta or rice dishes. Prepackaged cereal  with added sugar.  § Vegetables: Deep fried potatoes (french fries).  § Fruits: Fruit canned in syrup.  § Meats and other protein foods: Beef. Pork. Lamb. Poultry with skin. Hot dogs. [place]ott.  § Dairy: Ice cream. Sour cream. Whole milk.  § Beverages: Juice. Sugar-sweetened soft drinks. Beer. Liquor and spirits.  § Fats and oils: Butter. Canola oil. Vegetable oil. Beef fat (tallow). Lard.  § Sweets and desserts: Cookies. Cakes. Pies. Candy.  § Seasoning and other foods: Mayonnaise. Premade sauces and marinades.  The items listed may not be a complete list. Talk with your dietitian about what dietary choices are right for you.  Summary  · The Mediterranean diet includes both food and lifestyle choices.  · Eat a variety of fresh fruits and vegetables, beans, nuts, seeds, and whole grains.  · Limit the amount of red meat and sweets that you eat.  · Talk with your health care provider about whether it is safe for you to drink red wine in moderation. This means 1 glass a day for nonpregnant women and 2 glasses a day for men. A glass of wine equals 5 oz (150 mL).  This information is not intended to replace advice given to you by your health care provider. Make sure you discuss any questions you have with your health care provider.  Document Revised: 08/17/2017 Document Reviewed: 08/10/2017  [employer] Patient Education © 2020 [employer]         Exercising to Stay Healthy  To become healthy and stay healthy, it is recommended that you do moderate-intensity and vigorous-intensity exercise. You can tell that you are exercising at a moderate intensity if your heart starts beating faster and you start breathing faster but can still hold a conversation. You can tell that you are exercising at a vigorous intensity if you are breathing much harder and faster and cannot hold a conversation while exercising.  Exercising regularly is important. It has many health benefits, such as:  · Improving overall fitness, flexibility, and  endurance.  · Increasing bone density.  · Helping with weight control.  · Decreasing body fat.  · Increasing muscle strength.  · Reducing stress and tension.  · Improving overall health.  How often should I exercise?  Choose an activity that you enjoy, and set realistic goals. Your health care provider can help you make an activity plan that works for you.  Exercise regularly as told by your health care provider. This may include:  1. Doing strength training two times a week, such as:  ? Lifting weights.  ? Using resistance bands.  ? Push-ups.  ? Sit-ups.  ? Yoga.  2. Doing a certain intensity of exercise for a given amount of time. Choose from these options:  ? A total of 150 minutes of moderate-intensity exercise every week.  ? A total of 75 minutes of vigorous-intensity exercise every week.  ? A mix of moderate-intensity and vigorous-intensity exercise every week.  Children, pregnant women, people who have not exercised regularly, people who are overweight, and older adults may need to talk with a health care provider about what activities are safe to do. If you have a medical condition, be sure to talk with your health care provider before you start a new exercise program.  What are some exercise ideas?    Moderate-intensity exercise ideas include:  · Walking 1 mile (1.6 km) in about 15 minutes.  · Biking.  · Hiking.  · Golfing.  · Dancing.  · Water aerobics.  Vigorous-intensity exercise ideas include:  · Walking 4.5 miles (7.2 km) or more in about 1 hour.  · Jogging or running 5 miles (8 km) in about 1 hour.  · Biking 10 miles (16.1 km) or more in about 1 hour.  · Lap swimming.  · Roller-skating or in-line skating.  · Cross-country skiing.  · Vigorous competitive sports, such as football, basketball, and soccer.  · Jumping rope.  · Aerobic dancing.  What are some everyday activities that can help me to get exercise?  1. Yard work, such as:  ? Pushing a .  ? Raking and bagging leaves.  2. Washing your  car.  3. Pushing a stroller.  4. Shoveling snow.  5. Gardening.  6. Washing windows or floors.  How can I be more active in my day-to-day activities?  · Use stairs instead of an elevator.  · Take a walk during your lunch break.  · If you drive, park your car farther away from your work or school.  · If you take public transportation, get off one stop early and walk the rest of the way.  · Stand up or walk around during all of your indoor phone calls.  · Get up, stretch, and walk around every 30 minutes throughout the day.  · Enjoy exercise with a friend. Support to continue exercising will help you keep a regular routine of activity.  What guidelines can I follow while exercising?  · Before you start a new exercise program, talk with your health care provider.  · Do not exercise so much that you hurt yourself, feel dizzy, or get very short of breath.  · Wear comfortable clothes and wear shoes with good support.  · Drink plenty of water while you exercise to prevent dehydration or heat stroke.  · Work out until your breathing and your heartbeat get faster.  Where to find more information  · [place] Department of Health and Human Services: www.hhs.gov  · Centers for Disease Control and Prevention (CDC): www.cdc.gov  Summary  · Exercising regularly is important. It will improve your overall fitness, flexibility, and endurance.  · Regular exercise also will improve your overall health. It can help you control your weight, reduce stress, and improve your bone density.  · Do not exercise so much that you hurt yourself, feel dizzy, or get very short of breath.  · Before you start a new exercise program, talk with your health care provider.  This information is not intended to replace advice given to you by your health care provider. Make sure you discuss any questions you have with your health care provider.  Document Revised: 11/30/2018 Document Reviewed: 11/08/2018  [employer] Patient Education © 2021 [employer]            Mindfulness-Based Stress Reduction  Mindfulness-based stress reduction (MBSR) is a program that helps people learn to practice mindfulness. Mindfulness is the practice of intentionally paying attention to the present moment. It can be learned and practiced through techniques such as education, breathing exercises, meditation, and yoga. MBSR includes several mindfulness techniques in one program.  MBSR works best when you understand the treatment, are willing to try new things, and can commit to spending time practicing what you learn. MBSR training may include learning about:  · How your emotions, thoughts, and reactions affect your body.  · New ways to respond to things that cause negative thoughts to start (triggers).  · How to notice your thoughts and let go of them.  · Practicing awareness of everyday things that you normally do without thinking.  · The techniques and goals of different types of meditation.  What are the benefits of MBSR?  MBSR can have many benefits, which include helping you to:  · Develop self-awareness. This refers to knowing and understanding yourself.  · Learn skills and attitudes that help you to participate in your own health care.  · Learn new ways to care for yourself.  · Be more accepting about how things are, and let things go.  · Be less judgmental and approach things with an open mind.  · Be patient with yourself and trust yourself more.  MBSR has also been shown to:  · Reduce negative emotions, such as depression and anxiety.  · Improve memory and focus.  · Change how you sense and approach pain.  · Boost your body's ability to fight infections.  · Help you connect better with other people.  · Improve your sense of well-being.  Follow these instructions at home:       1. Find a local in-person or online MBSR program.  2. Set aside some time regularly for mindfulness practice.  3. Find a mindfulness practice that works best for you. This may include one or more of the  following:  ? Meditation. Meditation involves focusing your mind on a certain thought or activity.  ? Breathing awareness exercises. These help you to stay present by focusing on your breath.  ? Body scan. For this practice, you lie down and pay attention to each part of your body from head to toe. You can identify tension and soreness and intentionally relax parts of your body.  ? Yoga. Yoga involves stretching and breathing, and it can improve your ability to move and be flexible. It can also provide an experience of testing your body's limits, which can help you release stress.  ? Mindful eating. This way of eating involves focusing on the taste, texture, color, and smell of each bite of food. Because this slows down eating and helps you feel full sooner, it can be an important part of a weight-loss plan.  4. Find a podcast or recording that provides guidance for breathing awareness, body scan, or meditation exercises. You can listen to these any time when you have a free moment to rest without distractions.  5. Follow your treatment plan as told by your health care provider. This may include taking regular medicines and making changes to your diet or lifestyle as recommended.  How to practice mindfulness  To do a basic awareness exercise:  · Find a comfortable place to sit.  · Pay attention to the present moment. Observe your thoughts, feelings, and surroundings just as they are.  · Avoid placing judgment on yourself, your feelings, or your surroundings. Make note of any judgment that comes up, and let it go.  · Your mind may wander, and that is okay. Make note of when your thoughts drift, and return your attention to the present moment.  To do basic mindfulness meditation:  1. Find a comfortable place to sit. This may include a stable chair or a firm floor cushion.  ? Sit upright with your back straight. Let your arms fall next to your side with your hands resting on your legs.  ? If sitting in a chair, rest  your feet flat on the floor.  ? If sitting on a cushion, cross your legs in front of you.  2. Keep your head in a neutral position with your chin dropped slightly. Relax your jaw and rest the tip of your tongue on the roof of your mouth. Drop your gaze to the floor. You can close your eyes if you like.  3. Breathe normally and pay attention to your breath. Feel the air moving in and out of your nose. Feel your belly expanding and relaxing with each breath.  4. Your mind may wander, and that is okay. Make note of when your thoughts drift, and return your attention to your breath.  5. Avoid placing judgment on yourself, your feelings, or your surroundings. Make note of any judgment or feelings that come up, let them go, and bring your attention back to your breath.  6. When you are ready, lift your gaze or open your eyes. Pay attention to how your body feels after the meditation.  Where to find more information  You can find more information about MBSR from:  · Your health care provider.  · Community-based meditation centers or programs.  · Programs offered near you.  Summary  · Mindfulness-based stress reduction (MBSR) is a program that teaches you how to intentionally pay attention to the present moment. It is used with other treatments to help you cope better with daily stress, emotions, and pain.  · MBSR focuses on developing self-awareness, which allows you to respond to life stress without judgment or negative emotions.  · MBSR programs may involve learning different mindfulness practices, such as breathing exercises, meditation, yoga, body scan, or mindful eating. Find a mindfulness practice that works best for you, and set aside time for it on a regular basis.  This information is not intended to replace advice given to you by your health care provider. Make sure you discuss any questions you have with your health care provider.  Document Revised: 02/22/2021 Document Reviewed: 04/26/2018  Elsevier Patient  Education © 2021 [employer]

## 2021-10-20 ENCOUNTER — READMISSION MANAGEMENT (OUTPATIENT)
Dept: CALL CENTER | Facility: HOSPITAL | Age: 71
End: 2021-10-20

## 2021-10-20 ENCOUNTER — TELEPHONE (OUTPATIENT)
Dept: CARDIOLOGY | Facility: CLINIC | Age: 71
End: 2021-10-20

## 2021-10-20 NOTE — TELEPHONE ENCOUNTER
Received VM from patient.     Returned call. Patient stated that he would like to be enrolled in cardiac rehab. Advised patient that cardiac rehab is currently closed due to COVID but that once reopened we will get patient set up.

## 2021-10-20 NOTE — OUTREACH NOTE
[facility] Week 2 Survey      Responses   [facility] patient discharged from? [facility]   Does the patient have one of the following disease processes/diagnoses(primary or secondary)? Acute MI (STEMI,NSTEMI)   Week 2 attempt successful? Yes   Call start time [id number]   Rescheduled Revoked  [Does not feel calls are necessary. Would like to participate in cardiac rehab]   Revoke Decline to participate   Discharge diagnosis NSTEMI, heart cath & stent, A-fib           Gayatri Martin RN

## 2021-10-22 ENCOUNTER — APPOINTMENT (OUTPATIENT)
Dept: SLEEP MEDICINE | Facility: HOSPITAL | Age: 71
End: 2021-10-22

## 2021-10-29 ENCOUNTER — TELEPHONE (OUTPATIENT)
Dept: CARDIOLOGY | Facility: CLINIC | Age: 71
End: 2021-10-29

## 2021-10-29 NOTE — TELEPHONE ENCOUNTER
Spoke to the patient & let him know that the monitor only transmits remotely q91 days, this should be automatically set up with the company. They are reviewed by [facility], if there is an issue, they contact Cari Hilton & then we would contact him. We do not call if the transmission was normal. Patient states understanding.

## 2021-10-29 NOTE — TELEPHONE ENCOUNTER
Desiree SANDOVAL    PT had pacemaker placed 6 weeks ago and has a bedside monitor and is asking if we are getting the transmission and if we can call him back to give him the information.

## 2021-10-31 LAB
QT INTERVAL: 390 MS
QT INTERVAL: 390 MS
QT INTERVAL: 425 MS
QT INTERVAL: 435 MS
QT INTERVAL: 441 MS

## 2021-12-02 ENCOUNTER — LAB (OUTPATIENT)
Dept: LAB | Facility: HOSPITAL | Age: 71
End: 2021-12-02

## 2021-12-02 ENCOUNTER — ANTICOAGULATION VISIT (OUTPATIENT)
Dept: CARDIOLOGY | Facility: CLINIC | Age: 71
End: 2021-12-02

## 2021-12-02 DIAGNOSIS — I48.0 PAROXYSMAL ATRIAL FIBRILLATION (HCC): Primary | ICD-10-CM

## 2021-12-02 DIAGNOSIS — I48.91 ATRIAL FIBRILLATION, UNSPECIFIED TYPE (HCC): Primary | ICD-10-CM

## 2021-12-02 DIAGNOSIS — Z79.01 LONG TERM (CURRENT) USE OF ANTICOAGULANTS: ICD-10-CM

## 2021-12-02 LAB
INR PPP: 1.03 (ref 2–3)
PROTHROMBIN TIME: 10.8 SECONDS (ref 9.4–12)

## 2021-12-02 PROCEDURE — 36415 COLL VENOUS BLD VENIPUNCTURE: CPT

## 2021-12-02 PROCEDURE — 85610 PROTHROMBIN TIME: CPT

## 2021-12-03 ENCOUNTER — OFFICE VISIT (OUTPATIENT)
Dept: CARDIOLOGY | Facility: CLINIC | Age: 71
End: 2021-12-03

## 2021-12-03 VITALS
DIASTOLIC BLOOD PRESSURE: 90 MMHG | BODY MASS INDEX: 25.48 KG/M2 | WEIGHT: 182 LBS | HEART RATE: 70 BPM | HEIGHT: 71 IN | SYSTOLIC BLOOD PRESSURE: 132 MMHG

## 2021-12-03 DIAGNOSIS — I10 ESSENTIAL HYPERTENSION: Chronic | ICD-10-CM

## 2021-12-03 DIAGNOSIS — Z95.0 PRESENCE OF CARDIAC PACEMAKER: ICD-10-CM

## 2021-12-03 DIAGNOSIS — I48.19 PERSISTENT ATRIAL FIBRILLATION (HCC): ICD-10-CM

## 2021-12-03 DIAGNOSIS — E78.5 HYPERLIPIDEMIA LDL GOAL <70: ICD-10-CM

## 2021-12-03 DIAGNOSIS — Z95.5 S/P CORONARY ARTERY STENT PLACEMENT: Primary | ICD-10-CM

## 2021-12-03 PROBLEM — R00.1 BRADYCARDIA, SINUS: Status: RESOLVED | Noted: 2019-05-22 | Resolved: 2021-12-03

## 2021-12-03 PROBLEM — M41.20 IDIOPATHIC SCOLIOSIS AND KYPHOSCOLIOSIS: Status: ACTIVE | Noted: 2018-01-22

## 2021-12-03 PROBLEM — I45.9 HEART BLOCK: Status: RESOLVED | Noted: 2021-09-07 | Resolved: 2021-12-03

## 2021-12-03 PROBLEM — Z79.01 LONG TERM (CURRENT) USE OF ANTICOAGULANTS: Status: RESOLVED | Noted: 2019-05-24 | Resolved: 2021-12-03

## 2021-12-03 PROBLEM — I21.4 NSTEMI (NON-ST ELEVATED MYOCARDIAL INFARCTION) (HCC): Status: RESOLVED | Noted: 2021-10-11 | Resolved: 2021-12-03

## 2021-12-03 PROBLEM — R07.9 CHEST PAIN: Status: RESOLVED | Noted: 2021-10-10 | Resolved: 2021-12-03

## 2021-12-03 PROBLEM — R79.1 SUPRATHERAPEUTIC INR: Status: RESOLVED | Noted: 2021-10-11 | Resolved: 2021-12-03

## 2021-12-03 PROBLEM — Z79.899 LONG-TERM USE OF HIGH-RISK MEDICATION: Chronic | Status: RESOLVED | Noted: 2021-07-27 | Resolved: 2021-12-03

## 2021-12-03 PROCEDURE — 99214 OFFICE O/P EST MOD 30 MIN: CPT | Performed by: NURSE PRACTITIONER

## 2021-12-03 RX ORDER — BRIMONIDINE TARTRATE/TIMOLOL 0.2%-0.5%
DROPS OPHTHALMIC (EYE)
COMMUNITY
Start: 2021-09-16

## 2021-12-03 RX ORDER — SULFASALAZINE 500 MG/1
500 TABLET ORAL 2 TIMES DAILY
COMMUNITY
End: 2022-01-12 | Stop reason: SDUPTHER

## 2021-12-03 RX ORDER — WARFARIN SODIUM 5 MG/1
5 TABLET ORAL TAKE AS DIRECTED
COMMUNITY
End: 2021-12-03 | Stop reason: ALTCHOICE

## 2021-12-03 NOTE — PATIENT INSTRUCTIONS
"Low-Sodium Eating Plan  Sodium, which is an element that makes up salt, helps you maintain a healthy balance of fluids in your body. Too much sodium can increase your blood pressure and cause fluid and waste to be held in your body.  Your health care provider or dietitian may recommend following this plan if you have high blood pressure (hypertension), kidney disease, liver disease, or heart failure. Eating less sodium can help lower your blood pressure, reduce swelling, and protect your heart, liver, and kidneys.  What are tips for following this plan?  Reading food labels  · The Nutrition Facts label lists the amount of sodium in one serving of the food. If you eat more than one serving, you must multiply the listed amount of sodium by the number of servings.  · Choose foods with less than 140 mg of sodium per serving.  · Avoid foods with 300 mg of sodium or more per serving.  Shopping    · Look for lower-sodium products, often labeled as \"low-sodium\" or \"no salt added.\"  · Always check the sodium content, even if foods are labeled as \"unsalted\" or \"no salt added.\"  · Buy fresh foods.  ? Avoid canned foods and pre-made or frozen meals.  ? Avoid canned, cured, or processed meats.  · Buy breads that have less than 80 mg of sodium per slice.    Cooking    · Eat more home-cooked food and less restaurant, buffet, and fast food.  · Avoid adding salt when cooking. Use salt-free seasonings or herbs instead of table salt or sea salt. Check with your health care provider or pharmacist before using salt substitutes.  · Cook with plant-based oils, such as canola, sunflower, or olive oil.    Meal planning  · When eating at a restaurant, ask that your food be prepared with less salt or no salt, if possible. Avoid dishes labeled as brined, pickled, cured, smoked, or made with soy sauce, miso, or teriyaki sauce.  · Avoid foods that contain MSG (monosodium glutamate). MSG is sometimes added to Chinese food, bouillon, and some " "canned foods.  · Make meals that can be grilled, baked, poached, roasted, or steamed. These are generally made with less sodium.  General information  Most people on this plan should limit their sodium intake to 1,500-2,000 mg (milligrams) of sodium each day.  What foods should I eat?  Fruits  Fresh, frozen, or canned fruit. Fruit juice.  Vegetables  Fresh or frozen vegetables. \"No salt added\" canned vegetables. \"No salt added\" tomato sauce and paste. Low-sodium or reduced-sodium tomato and vegetable juice.  Grains  Low-sodium cereals, including oats, puffed wheat and rice, and shredded wheat. Low-sodium crackers. Unsalted rice. Unsalted pasta. Low-sodium bread. Whole-grain breads and whole-grain pasta.  Meats and other proteins  Fresh or frozen (no salt added) meat, poultry, seafood, and fish. Low-sodium canned tuna and salmon. Unsalted nuts. Dried peas, beans, and lentils without added salt. Unsalted canned beans. Eggs. Unsalted nut butters.  Dairy  Milk. Soy milk. Cheese that is naturally low in sodium, such as ricotta cheese, fresh mozzarella, or Swiss cheese. Low-sodium or reduced-sodium cheese. Cream cheese. Yogurt.  Seasonings and condiments  Fresh and dried herbs and spices. Salt-free seasonings. Low-sodium mustard and ketchup. Sodium-free salad dressing. Sodium-free light mayonnaise. Fresh or refrigerated horseradish. Lemon juice. Vinegar.  Other foods  Homemade, reduced-sodium, or low-sodium soups. Unsalted popcorn and pretzels. Low-salt or salt-free chips.  The items listed above may not be a complete list of foods and beverages you can eat. Contact a dietitian for more information.  What foods should I avoid?  Vegetables  Sauerkraut, pickled vegetables, and relishes. Olives. French fries. Onion rings. Regular canned vegetables (not low-sodium or reduced-sodium). Regular canned tomato sauce and paste (not low-sodium or reduced-sodium). Regular tomato and vegetable juice (not low-sodium or reduced-sodium). " Frozen vegetables in sauces.  Grains  Instant hot cereals. Bread stuffing, pancake, and biscuit mixes. Croutons. Seasoned rice or pasta mixes. Noodle soup cups. Boxed or frozen macaroni and cheese. Regular salted crackers. Self-rising flour.  Meats and other proteins  Meat or fish that is salted, canned, smoked, spiced, or pickled. Precooked or cured meat, such as sausages or meat loaves. Mcdermott. Ham. Pepperoni. Hot dogs. Corned beef. Chipped beef. Salt pork. Jerky. Pickled herring. Anchovies and sardines. Regular canned tuna. Salted nuts.  Dairy  Processed cheese and cheese spreads. Hard cheeses. Cheese curds. Blue cheese. Feta cheese. String cheese. Regular cottage cheese. Buttermilk. Canned milk.  Fats and oils  Salted butter. Regular margarine. Ghee. Mcdermott fat.  Seasonings and condiments  Onion salt, garlic salt, seasoned salt, table salt, and sea salt. Canned and packaged gravies. Worcestershire sauce. Tartar sauce. Barbecue sauce. Teriyaki sauce. Soy sauce, including reduced-sodium. Steak sauce. Fish sauce. Oyster sauce. Cocktail sauce. Horseradish that you find on the shelf. Regular ketchup and mustard. Meat flavorings and tenderizers. Bouillon cubes. Hot sauce. Pre-made or packaged marinades. Pre-made or packaged taco seasonings. Relishes. Regular salad dressings. Salsa.  Other foods  Salted popcorn and pretzels. Corn chips and puffs. Potato and tortilla chips. Canned or dried soups. Pizza. Frozen entrees and pot pies.  The items listed above may not be a complete list of foods and beverages you should avoid. Contact a dietitian for more information.  Summary  · Eating less sodium can help lower your blood pressure, reduce swelling, and protect your heart, liver, and kidneys.  · Most people on this plan should limit their sodium intake to 1,500-2,000 mg (milligrams) of sodium each day.  · Canned, boxed, and frozen foods are high in sodium. Restaurant foods, fast foods, and pizza are also very high in sodium.  You also get sodium by adding salt to food.  · Try to cook at home, eat more fresh fruits and vegetables, and eat less fast food and canned, processed, or prepared foods.  This information is not intended to replace advice given to you by your health care provider. Make sure you discuss any questions you have with your health care provider.  Document Revised: 01/22/2021 Document Reviewed: 11/18/2020  [employer] Patient Education © 2021 [employer]      Cholesterol Content in Foods  Cholesterol is a waxy, fat-like substance that helps to carry fat in the blood. The body needs cholesterol in small amounts, but too much cholesterol can cause damage to the arteries and heart.  Most people should eat less than 200 milligrams (mg) of cholesterol a day.  Foods with cholesterol    Cholesterol is found in animal-based foods, such as meat, seafood, and dairy. Generally, low-fat dairy and lean meats have less cholesterol than full-fat dairy and fatty meats. The milligrams of cholesterol per serving (mg per serving) of common cholesterol-containing foods are listed below.  Meat and other proteins  · Egg -- one large whole egg has 186 mg.  · Veal shank -- 4 oz has 141 mg.  · Lean ground turkey (93% lean) -- 4 oz has 118 mg.  · Fat-trimmed lamb loin -- 4 oz has 106 mg.  · Lean ground beef (90% lean) -- 4 oz has 100 mg.  · Lobster -- 3.5 oz has 90 mg.  · Pork loin chops -- 4 oz has 86 mg.  · Canned salmon -- 3.5 oz has 83 mg.  · Fat-trimmed beef top loin -- 4 oz has 78 mg.  · Frankfurter -- 1 jovani (3.5 oz) has 77 mg.  · Crab -- 3.5 oz has 71 mg.  · Roasted chicken without skin, white meat -- 4 oz has 66 mg.  · Light bologna -- 2 oz has 45 mg.  · Deli-cut turkey -- 2 oz has 31 mg.  · Canned tuna -- 3.5 oz has 31 mg.  · Mcdermott -- 1 oz has 29 mg.  · Oysters and mussels (raw) -- 3.5 oz has 25 mg.  · Mackerel -- 1 oz has 22 mg.  · Trout -- 1 oz has 20 mg.  · Pork sausage -- 1 link (1 oz) has 17 mg.  · [place] -- 1 oz has 16  mg.  · Tilapia -- 1 oz has 14 mg.  Dairy  · Soft-serve ice cream -- ½ cup (4 oz) has 103 mg.  · Whole-milk yogurt -- 1 cup (8 oz) has 29 mg.  · Cheddar cheese -- 1 oz has 28 mg.  · American cheese -- 1 oz has 28 mg.  · Whole milk -- 1 cup (8 oz) has 23 mg.  · 2% milk -- 1 cup (8 oz) has 18 mg.  · Cream cheese -- 1 tablespoon (Tbsp) has 15 mg.  · Cottage cheese -- ½ cup (4 oz) has 14 mg.  · Low-fat (1%) milk -- 1 cup (8 oz) has 10 mg.  · Sour cream -- 1 Tbsp has 8.5 mg.  · Low-fat yogurt -- 1 cup (8 oz) has 8 mg.  · Nonfat Greek yogurt -- 1 cup (8 oz) has 7 mg.  · Half-and-half cream -- 1 Tbsp has 5 mg.  Fats and oils  · Cod liver oil -- 1 tablespoon (Tbsp) has 82 mg.  · Butter -- 1 Tbsp has 15 mg.  · Lard -- 1 Tbsp has 14 mg.  · Mcdermott grease -- 1 Tbsp has 14 mg.  · Mayonnaise -- 1 Tbsp has 5-10 mg.  · Margarine -- 1 Tbsp has 3-10 mg.  Exact amounts of cholesterol in these foods may vary depending on specific ingredients and brands.  Foods without cholesterol  Most plant-based foods do not have cholesterol unless you combine them with a food that has cholesterol. Foods without cholesterol include:  · Grains and cereals.  · Vegetables.  · Fruits.  · Vegetable oils, such as olive, canola, and sunflower oil.  · Legumes, such as peas, beans, and lentils.  · Nuts and seeds.  · Egg whites.  Summary  · The body needs cholesterol in small amounts, but too much cholesterol can cause damage to the arteries and heart.  · Most people should eat less than 200 milligrams (mg) of cholesterol a day.  This information is not intended to replace advice given to you by your health care provider. Make sure you discuss any questions you have with your health care provider.  Document Revised: 05/10/2021 Document Reviewed: 05/10/2021  [employer] Patient Education © 2021 [employer]

## 2021-12-03 NOTE — PROGRESS NOTES
Chief Complaint  Hospital Follow Up Visit, Atrial Fibrillation, and Chest Pain    Subjective            History of Present Illness  Victor Manuel Mendoza is a 71-year-old white/ male patient who presents to the office today for follow-up.  He is status post coronary artery stent placement.  He has persistent atrial fibrillation, hypertension, hyperlipidemia, and presence of pacemaker.  He underwent cardiac catheterization on 10/11/2021 by Dr. Tolbert with successful stent placement.  He was discharged home with new prescription for aspirin, Plavix, and Xarelto.  He reports that the Xarelto is not cost effective for him and he may have to switch back to the warfarin.  He denies any chest pain, shortness of breath, lightheadedness/dizziness, palpitations, or edema.    PMH  Past Medical History:   Diagnosis Date   • Allergic    • Anxiety    • Arrhythmia    • Arthritis    • Atrial fibrillation (HCC)    • Bladder disorder    • Cataract    • Coronary artery disease    • HBP (high blood pressure)    • Heart disease    • High cholesterol    • HL (hearing loss)    • Hyperlipidemia    • Hyperlipidemia LDL goal <70 11/13/2019   • Hypertension    • Hypothyroidism    • Low back pain    • Paroxysmal atrial fibrillation 5/22/2019   • Peptic ulceration    • Rectal bleeding    • Sleep apnea     USES CPAP   • Ulcer (traumatic) of oral mucosa    • Visual impairment          ALLERGY  No Known Allergies       SURGICALHX  Past Surgical History:   Procedure Laterality Date   • CARDIAC CATHETERIZATION N/A 10/11/2021    Procedure: LEFT HEART CATH;  Surgeon: Brock Tolbert MD;  Location: [facility] INVASIVE LOC[facility];  Service: Cardiovascular;  Laterality: N/A;   • CARDIAC ELECTROPHYSIOLOGY PROCEDURE N/A 9/20/2021    Procedure: Pacemaker [facility] new/[facility];  Surgeon: Clarence Ramírez MD;  Location: [facility] INVASIVE LOCATION;  Service: Cardiology;  Laterality: N/A;   • CATARACT EXTRACTION WITH INTRAOCULAR LENS IMPLANT Bilateral    •  COLONOSCOPY     • CORONARY STENT PLACEMENT     • ENDOSCOPY     • EYE SURGERY  11/1/2020   • PACEMAKER IMPLANTATION     • SHOULDER SURGERY Right     REPLACEMENT   • SHOULDER SURGERY Left           SOC  Social History     Socioeconomic History   • Marital status:    Tobacco Use   • Smoking status: Never Smoker   • Smokeless tobacco: Never Used   Vaping Use   • Vaping Use: Never used   Substance and Sexual Activity   • Alcohol use: Yes     Alcohol/week: 0.0 standard drinks     Comment: 5-6 beers a week, couple shots of bourbon a week   • Drug use: No   • Sexual activity: Not Currently     Partners: Female         FAMHX  Family History   Problem Relation Age of Onset   • Breast cancer Maternal Grandmother 50   • Colon cancer Maternal Grandfather 70   • Alcohol abuse Maternal Grandfather    • Cancer Maternal Grandfather    • Heart disease Father    • Hypertension Father           MEDSIGONLY  Current Outpatient Medications on File Prior to Visit   Medication Sig   • Combigan 0.2-0.5 % ophthalmic solution    • finasteride (PROSCAR) 5 MG tablet TAKE 1 TABLET BY MOUTH EVERY DAY (Patient taking differently: Take 5 mg by mouth Daily.)   • Flaxseed, Linseed, (FLAX SEED OIL) 1300 MG capsule Take  by mouth.   • irbesartan (AVAPRO) 300 MG tablet Take 1 tablet by mouth Daily. Hold til seen by pcp monitor bp at home   • levothyroxine (SYNTHROID, LEVOTHROID) 25 MCG tablet TAKE 1.5 TABLETS BY MOUTH EVERY DAY (Patient taking differently: Take 37.5 mcg by mouth Daily.)   • metoprolol succinate XL (TOPROL-XL) 25 MG 24 hr tablet Take 1 tablet by mouth Daily.   • Multiple Vitamin (MULTI-VITAMIN DAILY PO) Take  by mouth.   • pravastatin (PRAVACHOL) 40 MG tablet Take 1 tablet by mouth Daily.   • rivaroxaban (Xarelto) 20 MG tablet Take 1 tablet by mouth Daily for 30 days.   • sulfaSALAzine (AZULFIDINE) 500 MG tablet Take 500 mg by mouth 2 (Two) Times a Day.   • vitamin B-12 (CYANOCOBALAMIN) 1000 MCG tablet Take 1,000 mcg by mouth  "Daily.   • [DISCONTINUED] citalopram (CeleXA) 10 MG tablet Take 1 tablet by mouth Daily.   • [DISCONTINUED] warfarin (COUMADIN) 5 MG tablet Take 5 mg by mouth Take As Directed.     No current facility-administered medications on file prior to visit.         Objective   /90   Pulse 70   Ht 180.3 cm (71\")   Wt 82.6 kg (182 lb)   BMI 25.38 kg/m²       Physical Exam  HENT:      Head: Normocephalic.   Neck:      Vascular: No carotid bruit.   Cardiovascular:      Rate and Rhythm: Normal rate and regular rhythm.      Pulses: Normal pulses.      Heart sounds: Normal heart sounds. No murmur heard.      Pulmonary:      Effort: Pulmonary effort is normal.      Breath sounds: Normal breath sounds.   Musculoskeletal:      Cervical back: Neck supple.      Right lower leg: No edema.      Left lower leg: No edema.   Skin:     General: Skin is dry.      Capillary Refill: Capillary refill takes less than 2 seconds.   Neurological:      Mental Status: He is alert and oriented to person, place, and time.   Psychiatric:         Behavior: Behavior normal.       Result Review :   The following data was reviewed by: AMANDA Gutiérrez on 12/03/2021:  proBNP   Date Value Ref Range Status   10/10/2021 3,329.0 (H) 0.0 - 900.0 pg/mL Final     CMP    CMP 10/19/21   Glucose 84   BUN 15   Creatinine 1.08   eGFR Non African Am 67   Sodium 141   Potassium 4.5   Chloride 104   Calcium 9.3   Albumin 4.90   Total Bilirubin 0.7   Alkaline Phosphatase 39   AST (SGOT) 29   ALT (SGPT) 18   (A) Abnormal value            CBC w/diff    CBC w/Diff 10/12/21   WBC 8.79   RBC 3.65 (A)   Hemoglobin 11.9 (A)   Hematocrit 35.9 (A)   MCV 98.4 (A)   MCH 32.6   MCHC 33.1   RDW 12.8   Platelets 205   Neutrophil Rel %    Immature Granulocyte Rel %    Lymphocyte Rel %    Monocyte Rel %    Eosinophil Rel %    Basophil Rel %    (A) Abnormal value             Lab Results   Component Value Date    TSH 2.530 04/08/2021      Lab Results   Component Value Date    " FREET4 1.3 04/08/2021      No results found for: DDIMERQUANT  Magnesium   Date Value Ref Range Status   10/11/2021 1.9 1.6 - 2.4 mg/dL Final      No results found for: DIGOXIN   Lab Results   Component Value Date    TROPONINT 0.123 (C) 10/11/2021           Lipid Panel    Lipid Panel 10/11/21   Total Cholesterol 128   Triglycerides 105   HDL Cholesterol 38 (A)   VLDL Cholesterol 20   LDL Cholesterol  70   LDL/HDL Ratio 1.82   (A) Abnormal value       Comments are available for some flowsheets but are not being displayed.             Results for orders placed during the hospital encounter of 08/30/21    Adult Transthoracic Echo Complete W/ Cont if Necessary Per Protocol    Interpretation Summary  · Calculated left ventricular EF = 59.6% Estimated left ventricular EF was in agreement with the calculated left ventricular EF. Left ventricular systolic function is normal.  · The right ventricular cavity is borderline dilated.  · Mildly reduced right ventricular systolic function noted.  · Left ventricular diastolic function was indeterminate.  · Left atrial volume is severely increased.  · The right atrial cavity is moderate to severely dilated.  · There is mild, bileaflet mitral valve thickening present.  · Mild to moderate mitral valve regurgitation is present.  · Mild tricuspid valve regurgitation is present.  · Estimated right ventricular systolic pressure from tricuspid regurgitation is mildly elevated (35-45 mmHg).  · Mild dilation of the aortic root is present.    10/11/21 Cardiac Cath:  Conclusions:  1. Successful PCI to the distal LAD using Xience Yana 2.25/18 and 2.25/12 mm drug-eluting stents.  Preprocedure diffuse disease with up to 80-90% stenosis, post-procedure 0% residual stenosis.  NELA-3 flow was present pre-and post-procedure.  Type B2 lesion.  No evidence of complications.  2. Successful PCI to the ostial segment of the posterolateral branch of the RCA using a Xience Yana 2.75/15 mm drug-eluting  stent.  Pre-procedure 90% stenosis, post-procedure 0% residual stenosis.  Type B2 lesion.  No evidence of complications.  3. Successful PTCA to the ostium of the PDA branch of the RCA using a Trek 2.5/12 mm balloon.  Pre-procedure 70% stenosis, post-procedure 30-40% residual stenosis.  Type B2 lesion.  No evidence of complications.  4. Residual moderate nonobstructive disease in the ostium of the diagonal branch and in the mid RCA.  5. Normal left ventricular systolic function with an estimated ejection fraction of 60%.  A focal area of hypokinesis was observed in the distal inferior wall, but otherwise normal left ventricular wall motion throughout.  6. Borderline elevated LVEDP.     Recommendations:   1. Continue routine post-interventional care and monitoring, including post-procedure IV fluid hydration for prophylaxis scans contrast-induced nephropathy.  2. Mr. Mendoza will require dual antiplatelet therapy with aspirin 81 mg daily and clopidogrel 75 mg daily for two weeks.  After that time, I would recommend discontinuing aspirin and continuing antiplatelet monotherapy with clopidogrel for 1 year (due to the patient's concomitant chronic anticoagulation with warfarin and the excessive bleeding risks associated with long-term triple therapy).  3. Continue optimal medical therapy for coronary artery disease, including high-intensity statin therapy with an LDL goal <70.  Will also continue his chronic dose of Toprol-XL.    4. Will arrange outpatient cardiac rehab.  Secondary prevention instructions were reviewed in detail with Mr. Mendoza and his wife.           Assessment and Plan    Diagnoses and all orders for this visit:    1. S/P coronary artery stent placement (Primary)  He currently denies any anginal symptoms, continue Plavix 75 mg daily.    2. Persistent atrial fibrillation   Symptomatically stable at this time, continue metoprolol 25 mg daily.  Continue Xarelto and if he cannot afford this we will have to  switch him back to warfarin, he is going to discuss this with his pharmacy and insurance company and get back with us.    3. Essential hypertension  He reports normal at home, continue irbesartan 300 mg daily.  Advised to check blood pressure at home 2-3 times a week and to notify office of any out of range.  He will do so for the next couple weeks he states.    4. Hyperlipidemia LDL goal <70  Last lipid panel was 10/11/2021 with LDL of 70 which is within his goal range, continue pravastatin 40 mg daily.    5. Presence of cardiac pacemaker  His pacemaker is checked by Dr. Ramírez, will continue to monitor.          Follow Up   Return in about 6 months (around 6/3/2022) for Follow up with Dr Tolbert.    Patient was given instructions and counseling regarding his condition or for health maintenance advice. Please see specific information pulled into the AVS if appropriate.     Victor Manuel DIANA Mendoza  reports that he has never smoked. He has never used smokeless tobacco.         Angely Diamond, APRN  12/03/21  11:41 EST    Dictated Utilizing Dragon Dictation

## 2021-12-08 ENCOUNTER — TELEPHONE (OUTPATIENT)
Dept: CARDIOLOGY | Facility: CLINIC | Age: 71
End: 2021-12-08

## 2021-12-17 NOTE — TELEPHONE ENCOUNTER
Patient want a RX for Eliquis, Xarelto, and Pradaxa to be sent to his pharmacy to check cost. LVM explaining that he will need to call his insurance to check the price of the medication.

## 2021-12-20 PROCEDURE — 93296 REM INTERROG EVL PM/IDS: CPT | Performed by: INTERNAL MEDICINE

## 2021-12-20 PROCEDURE — 93294 REM INTERROG EVL PM/LDLS PM: CPT | Performed by: INTERNAL MEDICINE

## 2021-12-20 NOTE — TELEPHONE ENCOUNTER
Patient last OV  06.03.22      Medication was documented at that visit.       Next OV 12.03.21     Spoke with the patient and his wife regarding the cost of this medication. Information was given for the Extra Help program thorough the [facility] and an application for patient assistance.

## 2022-01-12 ENCOUNTER — OFFICE VISIT (OUTPATIENT)
Dept: INTERNAL MEDICINE | Facility: CLINIC | Age: 72
End: 2022-01-12

## 2022-01-12 VITALS
TEMPERATURE: 98 F | SYSTOLIC BLOOD PRESSURE: 123 MMHG | HEIGHT: 71 IN | OXYGEN SATURATION: 98 % | BODY MASS INDEX: 25.06 KG/M2 | DIASTOLIC BLOOD PRESSURE: 78 MMHG | WEIGHT: 179 LBS | HEART RATE: 59 BPM

## 2022-01-12 DIAGNOSIS — J01.00 ACUTE NON-RECURRENT MAXILLARY SINUSITIS: Primary | ICD-10-CM

## 2022-01-12 DIAGNOSIS — J06.9 UPPER RESPIRATORY TRACT INFECTION, UNSPECIFIED TYPE: ICD-10-CM

## 2022-01-12 PROBLEM — H04.123 DRY EYES: Status: ACTIVE | Noted: 2021-10-21

## 2022-01-12 PROCEDURE — 99213 OFFICE O/P EST LOW 20 MIN: CPT | Performed by: NURSE PRACTITIONER

## 2022-01-12 RX ORDER — WARFARIN SODIUM 5 MG/1
1 TABLET ORAL DAILY
COMMUNITY
Start: 2021-10-21 | End: 2022-02-16 | Stop reason: SDUPTHER

## 2022-01-12 RX ORDER — METHYLPREDNISOLONE 4 MG/1
TABLET ORAL
Qty: 21 EACH | Refills: 0 | Status: SHIPPED | OUTPATIENT
Start: 2022-01-12 | End: 2022-05-18

## 2022-01-12 RX ORDER — BROMPHENIRAMINE MALEATE, PSEUDOEPHEDRINE HYDROCHLORIDE, AND DEXTROMETHORPHAN HYDROBROMIDE 2; 30; 10 MG/5ML; MG/5ML; MG/5ML
10 SYRUP ORAL 4 TIMES DAILY PRN
Qty: 400 ML | Refills: 0 | Status: SHIPPED | OUTPATIENT
Start: 2022-01-12 | End: 2022-01-22

## 2022-01-12 RX ORDER — SULFASALAZINE 500 MG/1
500 TABLET ORAL 2 TIMES DAILY
Qty: 180 TABLET | Refills: 0 | Status: SHIPPED | OUTPATIENT
Start: 2022-01-12 | End: 2022-02-17

## 2022-01-12 RX ORDER — AMOXICILLIN AND CLAVULANATE POTASSIUM 875; 125 MG/1; MG/1
1 TABLET, FILM COATED ORAL 2 TIMES DAILY
Qty: 20 TABLET | Refills: 0 | Status: SHIPPED | OUTPATIENT
Start: 2022-01-12 | End: 2022-01-22

## 2022-01-12 NOTE — PROGRESS NOTES
Chief Complaint  Cough (Going on for 2 weeks) and Nasal Congestion (Going on for 2 weeks)    Subjective          Victor Manuel Mendoza presents to [facility]  Patient has received 2 doses of COVID vaccine as well as booster.     Cough  This is a new problem. The problem has been unchanged. The cough is non-productive. Associated symptoms include nasal congestion, postnasal drip and rhinorrhea. Pertinent negatives include no chest pain, chills, ear congestion, ear pain, fever, headaches, heartburn, hemoptysis, myalgias, rash, sore throat, shortness of breath, sweats, weight loss or wheezing. Nothing aggravates the symptoms.   URI   This is a new problem. Episode onset: 2 weeks. There has been no fever. Associated symptoms include congestion, coughing and rhinorrhea. Pertinent negatives include no chest pain, ear pain, headaches, rash, sore throat or wheezing.         Current Outpatient Medications   Medication Instructions   • amoxicillin-clavulanate (AUGMENTIN) 875-125 MG per tablet 1 tablet, Oral, 2 Times Daily   • brompheniramine-pseudoephedrine-DM (Bromfed DM) 30-2-10 MG/5ML syrup 10 mL, Oral, 4 Times Daily PRN   • Combigan 0.2-0.5 % ophthalmic solution No dose, route, or frequency recorded.   • finasteride (PROSCAR) 5 MG tablet TAKE 1 TABLET BY MOUTH EVERY DAY   • Flaxseed, Linseed, (FLAX SEED OIL) 1300 MG capsule Oral   • irbesartan (AVAPRO) 300 mg, Oral, Daily, Hold til seen by pcp monitor bp at home   • levothyroxine (SYNTHROID, LEVOTHROID) 25 MCG tablet TAKE 1.5 TABLETS BY MOUTH EVERY DAY   • methylPREDNISolone (Medrol) 4 MG dose pack Take as directed on package instructions.   • metoprolol succinate XL (TOPROL-XL) 25 mg, Oral, Daily   • Multiple Vitamin (MULTI-VITAMIN DAILY PO) Oral   • pravastatin (PRAVACHOL) 40 mg, Oral, Daily   • sulfaSALAzine (AZULFIDINE) 500 mg, Oral, 2 Times Daily   • vitamin B-12 (CYANOCOBALAMIN) 1,000 mcg, Oral, Daily   • warfarin (COUMADIN) 5  "MG tablet 1 tablet, Oral, Daily       The following portions of the patient's history were reviewed and updated as appropriate: allergies, current medications, past family history, past medical history, past social history, past surgical history, and problem list.    Objective   Vital Signs:   /78 (BP Location: Right arm, Patient Position: Sitting, Cuff Size: Adult)   Pulse 59   Temp 98 °F (36.7 °C) (Temporal)   Ht 180.3 cm (71\")   Wt 81.2 kg (179 lb)   SpO2 98%   BMI 24.97 kg/m²     Wt Readings from Last 3 Encounters:   01/12/22 81.2 kg (179 lb)   12/03/21 82.6 kg (182 lb)   10/19/21 80.3 kg (177 lb)     BP Readings from Last 3 Encounters:   01/12/22 123/78   12/03/21 132/90   10/19/21 126/80     Physical Exam  Vitals and nursing note reviewed.   Constitutional:       General: He is not in acute distress.     Appearance: Normal appearance. He is not ill-appearing.   HENT:      Right Ear: Tympanic membrane, ear canal and external ear normal.      Left Ear: Tympanic membrane, ear canal and external ear normal.      Nose: Congestion and rhinorrhea present.      Right Sinus: Maxillary sinus tenderness present.      Left Sinus: Maxillary sinus tenderness present.      Mouth/Throat:      Mouth: Mucous membranes are moist.   Eyes:      Extraocular Movements: Extraocular movements intact.      Conjunctiva/sclera: Conjunctivae normal.   Cardiovascular:      Rate and Rhythm: Normal rate.   Pulmonary:      Effort: Pulmonary effort is normal.      Breath sounds: Normal breath sounds.   Skin:     General: Skin is warm and dry.      Capillary Refill: Capillary refill takes less than 2 seconds.   Neurological:      General: No focal deficit present.      Mental Status: He is alert and oriented to person, place, and time. Mental status is at baseline.   Psychiatric:         Mood and Affect: Mood normal.         Behavior: Behavior normal.         Thought Content: Thought content normal.         Judgment: Judgment " normal.            Result Review :   The following data was reviewed by: AMANDA Bazan on 01/12/2022:  Common labs    Common Labsle 10/11/21 10/11/21 10/11/21 10/11/21 10/12/21 10/12/21 10/19/21    0533 0533 0533 0533 0502 0502    Glucose   91   93 84   BUN   16   16 15   Creatinine   1.04   1.13 1.08   eGFR Non  Am   70   64 67   Sodium   138   136 141   Potassium   3.9   3.9 4.5   Chloride   106   103 104   Calcium   8.2 (A)   8.3 (A) 9.3   Albumin       4.90   Total Bilirubin       0.7   Alkaline Phosphatase       39   AST (SGOT)       29   ALT (SGPT)       18   WBC 4.85    8.79     Hemoglobin 11.8 (A)    11.9 (A)     Hematocrit 34.8 (A)    35.9 (A)     Platelets 193    205     Total Cholesterol  128        Triglycerides  105        HDL Cholesterol  38 (A)        LDL Cholesterol   70        Hemoglobin A1C    5.00      (A) Abnormal value                 Lab Results   Component Value Date    COVID19 Not Detected 09/17/2021    INR 1.03 (L) 12/02/2021     Declined COVID testing.   Patient is already out of 10 day quarantine window.   Procedures        Assessment and Plan    Diagnoses and all orders for this visit:    1. Acute non-recurrent maxillary sinusitis (Primary)  -     amoxicillin-clavulanate (AUGMENTIN) 875-125 MG per tablet; Take 1 tablet by mouth 2 (Two) Times a Day for 10 days.  Dispense: 20 tablet; Refill: 0    2. Upper respiratory tract infection, unspecified type  -     methylPREDNISolone (Medrol) 4 MG dose pack; Take as directed on package instructions.  Dispense: 21 each; Refill: 0  -     brompheniramine-pseudoephedrine-DM (Bromfed DM) 30-2-10 MG/5ML syrup; Take 10 mL by mouth 4 (Four) Times a Day As Needed for Congestion, Cough or Allergies for up to 10 days.  Dispense: 400 mL; Refill: 0        Medications Discontinued During This Encounter   Medication Reason   • rivaroxaban (Xarelto) 20 MG tablet    • rivaroxaban (Xarelto) 20 MG tablet       Take medications as prescribed.    Increase fluid intake.   Rest.   Tylenol/Motrin as needed for fever/pain control.       Follow Up   Return if symptoms worsen or fail to improve.  Patient was given instructions and counseling regarding his condition or for health maintenance advice. Please see specific information pulled into the AVS if appropriate.

## 2022-01-19 ENCOUNTER — TELEPHONE (OUTPATIENT)
Dept: CARDIOLOGY | Facility: CLINIC | Age: 72
End: 2022-01-19

## 2022-01-19 DIAGNOSIS — I48.0 PAROXYSMAL ATRIAL FIBRILLATION: Primary | ICD-10-CM

## 2022-01-19 NOTE — TELEPHONE ENCOUNTER
Patient stated that it is too much trouble to change from Warfarin to eliquis or Xarelto, because these medications are too expensive.     He does not want to sign up for any of the patient assistance programs.     He is currently taking 5 mg of Warfarin QD. Please advise.

## 2022-01-20 NOTE — TELEPHONE ENCOUNTER
Ok, that's fine is he wishes to remain on warfarin instead of a NOAC.  My understanding from the patient when he was in the hospital was that he planned to continue to follow up with his usual cardiologist (Dr. Danielle Scott at [facility]).  If so, they can manage his anticoagulation and all further questions/concerns need to be directed to their office.  If he wishes to follow up with me instead, we can start managing his warfarin in our Anticoagulation Clinic.  Thanks.

## 2022-01-21 NOTE — TELEPHONE ENCOUNTER
Patient stated that since he is here in [facility] he will continue to see you. He has not had his INR checked in the last six weeks.   He was instructed to have that done on Monday so that we can get the results.

## 2022-01-24 ENCOUNTER — LAB (OUTPATIENT)
Dept: LAB | Facility: HOSPITAL | Age: 72
End: 2022-01-24

## 2022-01-24 ENCOUNTER — ANTICOAGULATION VISIT (OUTPATIENT)
Dept: CARDIOLOGY | Facility: CLINIC | Age: 72
End: 2022-01-24

## 2022-01-24 DIAGNOSIS — I48.0 PAROXYSMAL ATRIAL FIBRILLATION: Primary | ICD-10-CM

## 2022-01-24 DIAGNOSIS — I48.0 PAROXYSMAL ATRIAL FIBRILLATION: ICD-10-CM

## 2022-01-24 LAB
INR PPP: 2.2 (ref 2–3)
PROTHROMBIN TIME: 21.4 SECONDS (ref 9.4–12)

## 2022-01-24 PROCEDURE — 36415 COLL VENOUS BLD VENIPUNCTURE: CPT

## 2022-01-24 PROCEDURE — 85610 PROTHROMBIN TIME: CPT

## 2022-02-08 ENCOUNTER — TELEPHONE (OUTPATIENT)
Dept: CARDIOLOGY | Facility: CLINIC | Age: 72
End: 2022-02-08

## 2022-02-08 NOTE — TELEPHONE ENCOUNTER
Pt called the office and said he wants you to monitor his PT and INR.   He is currently taking 5 mg qd and he will get his INR check sometime this week.

## 2022-02-16 ENCOUNTER — LAB (OUTPATIENT)
Dept: LAB | Facility: HOSPITAL | Age: 72
End: 2022-02-16

## 2022-02-16 ENCOUNTER — ANTICOAGULATION VISIT (OUTPATIENT)
Dept: CARDIOLOGY | Facility: CLINIC | Age: 72
End: 2022-02-16

## 2022-02-16 DIAGNOSIS — I48.0 PAROXYSMAL ATRIAL FIBRILLATION: ICD-10-CM

## 2022-02-16 DIAGNOSIS — I48.0 PAROXYSMAL ATRIAL FIBRILLATION: Primary | ICD-10-CM

## 2022-02-16 LAB
INR PPP: 2.38 (ref 2–3)
PROTHROMBIN TIME: 23 SECONDS (ref 9.4–12)

## 2022-02-16 PROCEDURE — 85610 PROTHROMBIN TIME: CPT

## 2022-02-16 PROCEDURE — 36415 COLL VENOUS BLD VENIPUNCTURE: CPT

## 2022-02-16 RX ORDER — WARFARIN SODIUM 5 MG/1
5 TABLET ORAL DAILY
Qty: 90 TABLET | Refills: 2 | Status: SHIPPED | OUTPATIENT
Start: 2022-02-16 | End: 2022-06-17 | Stop reason: ALTCHOICE

## 2022-02-16 NOTE — TELEPHONE ENCOUNTER
Ok, looks like his INR values have been in range and he can follow up in Anticoagulation Clinic as scheduled.

## 2022-02-17 DIAGNOSIS — E78.5 DYSLIPIDEMIA: Chronic | ICD-10-CM

## 2022-02-17 RX ORDER — SULFASALAZINE 500 MG/1
TABLET ORAL
Qty: 180 TABLET | Refills: 0 | Status: SHIPPED | OUTPATIENT
Start: 2022-02-17 | End: 2022-05-18 | Stop reason: SDUPTHER

## 2022-02-17 RX ORDER — PRAVASTATIN SODIUM 40 MG
40 TABLET ORAL DAILY
Qty: 90 TABLET | Refills: 1 | OUTPATIENT
Start: 2022-02-17

## 2022-02-18 ENCOUNTER — OFFICE VISIT (OUTPATIENT)
Dept: INTERNAL MEDICINE | Facility: CLINIC | Age: 72
End: 2022-02-18

## 2022-02-18 VITALS
WEIGHT: 178.6 LBS | TEMPERATURE: 97.7 F | SYSTOLIC BLOOD PRESSURE: 145 MMHG | HEIGHT: 71 IN | BODY MASS INDEX: 25 KG/M2 | OXYGEN SATURATION: 98 % | DIASTOLIC BLOOD PRESSURE: 92 MMHG | HEART RATE: 72 BPM

## 2022-02-18 DIAGNOSIS — I49.5 TACHY-BRADY SYNDROME: ICD-10-CM

## 2022-02-18 DIAGNOSIS — I10 ESSENTIAL HYPERTENSION: ICD-10-CM

## 2022-02-18 DIAGNOSIS — Z79.01 CHRONIC ANTICOAGULATION: ICD-10-CM

## 2022-02-18 DIAGNOSIS — Z99.89 OSA ON CPAP: ICD-10-CM

## 2022-02-18 DIAGNOSIS — Z95.0 PACEMAKER: ICD-10-CM

## 2022-02-18 DIAGNOSIS — Z12.5 ENCOUNTER FOR SCREENING FOR MALIGNANT NEOPLASM OF PROSTATE: ICD-10-CM

## 2022-02-18 DIAGNOSIS — G31.84 MILD COGNITIVE IMPAIRMENT: ICD-10-CM

## 2022-02-18 DIAGNOSIS — E03.8 OTHER SPECIFIED HYPOTHYROIDISM: ICD-10-CM

## 2022-02-18 DIAGNOSIS — Z00.00 MEDICARE ANNUAL WELLNESS VISIT, SUBSEQUENT: Primary | ICD-10-CM

## 2022-02-18 DIAGNOSIS — G47.33 OSA ON CPAP: ICD-10-CM

## 2022-02-18 DIAGNOSIS — I48.19 OTHER PERSISTENT ATRIAL FIBRILLATION: ICD-10-CM

## 2022-02-18 DIAGNOSIS — Z95.5 S/P CORONARY ARTERY STENT PLACEMENT: ICD-10-CM

## 2022-02-18 DIAGNOSIS — E78.5 HYPERLIPIDEMIA LDL GOAL <70: ICD-10-CM

## 2022-02-18 LAB
ALBUMIN SERPL-MCNC: 4.2 G/DL (ref 3.5–5.2)
ALBUMIN/GLOB SERPL: 1.8 G/DL
ALP SERPL-CCNC: 45 U/L (ref 39–117)
ALT SERPL W P-5'-P-CCNC: 23 U/L (ref 1–41)
ANION GAP SERPL CALCULATED.3IONS-SCNC: 8 MMOL/L (ref 5–15)
AST SERPL-CCNC: 33 U/L (ref 1–40)
BASOPHILS # BLD AUTO: 0.03 10*3/MM3 (ref 0–0.2)
BASOPHILS NFR BLD AUTO: 0.6 % (ref 0–1.5)
BILIRUB SERPL-MCNC: 0.5 MG/DL (ref 0–1.2)
BUN SERPL-MCNC: 18 MG/DL (ref 8–23)
BUN/CREAT SERPL: 16.7 (ref 7–25)
CALCIUM SPEC-SCNC: 8.7 MG/DL (ref 8.6–10.5)
CHLORIDE SERPL-SCNC: 105 MMOL/L (ref 98–107)
CHOLEST SERPL-MCNC: 170 MG/DL (ref 0–200)
CO2 SERPL-SCNC: 26 MMOL/L (ref 22–29)
CREAT SERPL-MCNC: 1.08 MG/DL (ref 0.76–1.27)
DEPRECATED RDW RBC AUTO: 42 FL (ref 37–54)
EOSINOPHIL # BLD AUTO: 0.07 10*3/MM3 (ref 0–0.4)
EOSINOPHIL NFR BLD AUTO: 1.4 % (ref 0.3–6.2)
ERYTHROCYTE [DISTWIDTH] IN BLOOD BY AUTOMATED COUNT: 12.1 % (ref 12.3–15.4)
GFR SERPL CREATININE-BSD FRML MDRD: 67 ML/MIN/1.73
GLOBULIN UR ELPH-MCNC: 2.4 GM/DL
GLUCOSE SERPL-MCNC: 74 MG/DL (ref 65–99)
HCT VFR BLD AUTO: 37.7 % (ref 37.5–51)
HDLC SERPL-MCNC: 56 MG/DL (ref 40–60)
HGB BLD-MCNC: 12.6 G/DL (ref 13–17.7)
IMM GRANULOCYTES # BLD AUTO: 0.01 10*3/MM3 (ref 0–0.05)
IMM GRANULOCYTES NFR BLD AUTO: 0.2 % (ref 0–0.5)
LDLC SERPL CALC-MCNC: 99 MG/DL (ref 0–100)
LDLC/HDLC SERPL: 1.76 {RATIO}
LYMPHOCYTES # BLD AUTO: 1.48 10*3/MM3 (ref 0.7–3.1)
LYMPHOCYTES NFR BLD AUTO: 30 % (ref 19.6–45.3)
MCH RBC QN AUTO: 31.9 PG (ref 26.6–33)
MCHC RBC AUTO-ENTMCNC: 33.4 G/DL (ref 31.5–35.7)
MCV RBC AUTO: 95.4 FL (ref 79–97)
MONOCYTES # BLD AUTO: 0.56 10*3/MM3 (ref 0.1–0.9)
MONOCYTES NFR BLD AUTO: 11.4 % (ref 5–12)
NEUTROPHILS NFR BLD AUTO: 2.78 10*3/MM3 (ref 1.7–7)
NEUTROPHILS NFR BLD AUTO: 56.4 % (ref 42.7–76)
NRBC BLD AUTO-RTO: 0 /100 WBC (ref 0–0.2)
PLATELET # BLD AUTO: 245 10*3/MM3 (ref 140–450)
PMV BLD AUTO: 9.5 FL (ref 6–12)
POTASSIUM SERPL-SCNC: 4.8 MMOL/L (ref 3.5–5.2)
PROT SERPL-MCNC: 6.6 G/DL (ref 6–8.5)
PSA SERPL-MCNC: 2.07 NG/ML (ref 0–4)
RBC # BLD AUTO: 3.95 10*6/MM3 (ref 4.14–5.8)
SODIUM SERPL-SCNC: 139 MMOL/L (ref 136–145)
TRIGL SERPL-MCNC: 78 MG/DL (ref 0–150)
TSH SERPL DL<=0.05 MIU/L-ACNC: 2.49 UIU/ML (ref 0.27–4.2)
VLDLC SERPL-MCNC: 15 MG/DL (ref 5–40)
WBC NRBC COR # BLD: 4.93 10*3/MM3 (ref 3.4–10.8)

## 2022-02-18 PROCEDURE — G0439 PPPS, SUBSEQ VISIT: HCPCS | Performed by: STUDENT IN AN ORGANIZED HEALTH CARE EDUCATION/TRAINING PROGRAM

## 2022-02-18 PROCEDURE — 1170F FXNL STATUS ASSESSED: CPT | Performed by: STUDENT IN AN ORGANIZED HEALTH CARE EDUCATION/TRAINING PROGRAM

## 2022-02-18 PROCEDURE — 85025 COMPLETE CBC W/AUTO DIFF WBC: CPT | Performed by: STUDENT IN AN ORGANIZED HEALTH CARE EDUCATION/TRAINING PROGRAM

## 2022-02-18 PROCEDURE — 84443 ASSAY THYROID STIM HORMONE: CPT | Performed by: STUDENT IN AN ORGANIZED HEALTH CARE EDUCATION/TRAINING PROGRAM

## 2022-02-18 PROCEDURE — G0103 PSA SCREENING: HCPCS | Performed by: STUDENT IN AN ORGANIZED HEALTH CARE EDUCATION/TRAINING PROGRAM

## 2022-02-18 PROCEDURE — 80061 LIPID PANEL: CPT | Performed by: STUDENT IN AN ORGANIZED HEALTH CARE EDUCATION/TRAINING PROGRAM

## 2022-02-18 PROCEDURE — 1159F MED LIST DOCD IN RCRD: CPT | Performed by: STUDENT IN AN ORGANIZED HEALTH CARE EDUCATION/TRAINING PROGRAM

## 2022-02-18 PROCEDURE — 80053 COMPREHEN METABOLIC PANEL: CPT | Performed by: STUDENT IN AN ORGANIZED HEALTH CARE EDUCATION/TRAINING PROGRAM

## 2022-02-18 RX ORDER — CLOPIDOGREL BISULFATE 75 MG/1
75 TABLET ORAL DAILY
COMMUNITY
End: 2022-05-18

## 2022-02-18 NOTE — PATIENT INSTRUCTIONS
Cooking With Less Salt  Cooking with less salt is one way to reduce the amount of sodium you get from food. Sodium is one of the elements that make up salt. It is found naturally in foods and is also added to certain foods. Depending on your condition and overall health, your health care provider or dietitian may recommend that you reduce your sodium intake. Most people should have less than 2,300 milligrams (mg) of sodium each day. If you have high blood pressure (hypertension), you may need to limit your sodium to 1,500 mg each day. Follow the tips below to help reduce your sodium intake.  What are tips for eating less sodium?  Reading food labels       · Check the food label before buying or using packaged ingredients. Always check the label for the serving size and sodium content.  · Look for products with no more than 140 mg of sodium in one serving.  · Check the % Daily Value column to see what percent of the daily recommended amount of sodium is provided in one serving of the product. Foods with 5% or less in this column are considered low in sodium. Foods with 20% or higher are considered high in sodium.  · Do not choose foods with salt as one of the first three ingredients on the ingredients list. If salt is one of the first three ingredients, it usually means the item is high in sodium.     Shopping  1. Buy sodium-free or low-sodium products. Look for the following words on food labels:  ? Low-sodium.  ? Sodium-free.  ? Reduced-sodium.  ? No salt added.  ? Unsalted.  2. Always check the sodium content even if foods are labeled as low-sodium or no salt added.  3. Buy fresh foods.  Cooking  · Use herbs, seasonings without salt, and spices as substitutes for salt.  · Use sodium-free baking soda when baking.  · [place], braise, or roast foods to add flavor with less salt.  · Avoid adding salt to pasta, rice, or hot cereals.  · Drain and rinse canned vegetables, beans, and meat before use.  · Avoid adding salt  "when cooking sweets and desserts.  · Cook with low-sodium ingredients.  What foods are high in sodium?  Vegetables  Regular canned vegetables (not low-sodium or reduced-sodium). Sauerkraut, pickled vegetables, and relishes. Olives. French fries. Onion rings. Regular canned tomato sauce and paste. Regular tomato and vegetable juice. Frozen vegetables in sauces.  Grains  Instant hot cereals. Bread stuffing, pancake, and biscuit mixes. Croutons. Seasoned rice or pasta mixes. Noodle soup cups. Boxed or frozen macaroni and cheese. Regular salted crackers. Self-rising flour. Rolls. Bagels. Flour tortillas and wraps.  Meats and other proteins  Meat or fish that is salted, canned, smoked, cured, spiced, or pickled. This includes fitzgerald, ham, sausages, hot dogs, corned beef, chipped beef, meat loaves, salt pork, jerky, pickled herring, anchovies, regular canned tuna, and sardines. Salted nuts.  Dairy  Processed cheese and cheese spreads. Cheese curds. Blue cheese. Feta cheese. String cheese. Regular cottage cheese. Buttermilk. Canned milk.  The items listed above may not be a complete list of foods high in sodium. Actual amounts of sodium may be different depending on processing. Contact a dietitian for more information.  What foods are low in sodium?  Fruits  Fresh, frozen, or canned fruit with no sauce added. Fruit juice.  Vegetables  Fresh or frozen vegetables with no sauce added. \"No salt added\" canned vegetables. \"No salt added\" tomato sauce and paste. Low-sodium or reduced-sodium tomato and vegetable juice.  Grains  Noodles, pasta, quinoa, rice. Shredded or puffed wheat or puffed rice. Regular or quick oats (not instant). Low-sodium crackers. Low-sodium bread. Whole-grain bread and whole-grain pasta. Unsalted popcorn.  Meats and other proteins  Fresh or frozen whole meats, poultry (not injected with sodium), and fish with no sauce added. Unsalted nuts. Dried peas, beans, and lentils without added salt. Unsalted canned " beans. Eggs. Unsalted nut butters. Low-sodium canned tuna or chicken.  Dairy  Milk. Soy milk. Yogurt. Low-sodium cheeses, such as Swiss, Rolette Eusebio, mozzarella, and ricotta. Sherbet or ice cream (keep to ½ cup per serving). Cream cheese.  Fats and oils  Unsalted butter or margarine.  Other foods  Homemade pudding. Sodium-free baking soda and baking powder. Herbs and spices. Low-sodium seasoning mixes.  Beverages  Coffee and tea. Carbonated beverages.  The items listed above may not be a complete list of foods low in sodium. Actual amounts of sodium may be different depending on processing. Contact a dietitian for more information.  What are some salt alternatives when cooking?  The following are herbs, seasonings, and spices that can be used instead of salt to flavor your food. Herbs should be fresh or dried. Do not choose packaged mixes. Next to the name of the herb, spice, or seasoning are some examples of foods you can pair it with.  Herbs  · Bay leaves - Soups, meat and vegetable dishes, and spaghetti sauce.  · Basil - Italian dishes, soups, pasta, and fish dishes.  · Cilantro - Meat, poultry, and vegetable dishes.  · Chili powder - Marinades and Mexican dishes.  · Chives - Salad dressings and potato dishes.  · Cumin - Mexican dishes, couscous, and meat dishes.  · Dill - Fish dishes, sauces, and salads.  · Fennel - Meat and vegetable dishes, breads, and cookies.  · Garlic (do not use garlic salt) - Italian dishes, meat dishes, salad dressings, and sauces.  · Marjoram - Soups, potato dishes, and meat dishes.  · Oregano - Pizza and spaghetti sauce.  · Parsley - Salads, soups, pasta, and meat dishes.  · Lydia - Italian dishes, salad dressings, soups, and red meats.  · Saffron - Fish dishes, pasta, and some poultry dishes.  · Lalit - Stuffings and sauces.  · Tarragon - Fish and poultry dishes.  · Thyme - Stuffing, meat, and fish dishes.  Seasonings  · Lemon juice - Fish dishes, poultry dishes, vegetables, and  "salads.  · Vinegar - Salad dressings, vegetables, and fish dishes.  Spices  · Cinnamon - Sweet dishes, such as cakes, cookies, and puddings.  · Cloves - Gingerbread, puddings, and marinades for meats.  · Cadena - Vegetable dishes, fish and poultry dishes, and stir-hayden dishes.  · Rosa Isela - Vegetable dishes, fish dishes, and stir-hayden dishes.  · Nutmeg - Pasta, vegetables, poultry, fish dishes, and custard.  Summary  · Cooking with less salt is one way to reduce the amount of sodium that you get from food.  · Buy sodium-free or low-sodium products.  · Check the food label before using or buying packaged ingredients.  · Use herbs, seasonings without salt, and spices as substitutes for salt in foods.  This information is not intended to replace advice given to you by your health care provider. Make sure you discuss any questions you have with your health care provider.  Document Revised: 12/09/2020 Document Reviewed: 12/09/2020  [employer] Patient Education © 2021 [employer]      https://www.nhlbi.nih.gov/files/docs/public/heart/dash_brief.pdf\">   DASH Eating Plan  DASH stands for Dietary Approaches to Stop Hypertension. The DASH eating plan is a healthy eating plan that has been shown to:  · Reduce high blood pressure (hypertension).  · Reduce your risk for type 2 diabetes, heart disease, and stroke.  · Help with weight loss.  What are tips for following this plan?  Reading food labels  · Check food labels for the amount of salt (sodium) per serving. Choose foods with less than 5 percent of the Daily Value of sodium. Generally, foods with less than 300 milligrams (mg) of sodium per serving fit into this eating plan.  · To find whole grains, look for the word \"whole\" as the first word in the ingredient list.  Shopping  · Buy products labeled as \"low-sodium\" or \"no salt added.\"  · Buy fresh foods. Avoid canned foods and pre-made or frozen meals.  Cooking  · Avoid adding salt when cooking. Use salt-free seasonings or herbs " instead of table salt or sea salt. Check with your health care provider or pharmacist before using salt substitutes.  · Do not hayden foods. Cook foods using healthy methods such as baking, boiling, grilling, roasting, and broiling instead.  · Cook with heart-healthy oils, such as olive, canola, avocado, soybean, or sunflower oil.  Meal planning       1. Eat a balanced diet that includes:  ? 4 or more servings of fruits and 4 or more servings of vegetables each day. Try to fill one-half of your plate with fruits and vegetables.  ? 6-8 servings of whole grains each day.  ? Less than 6 oz (170 g) of lean meat, poultry, or fish each day. A 3-oz (85-g) serving of meat is about the same size as a deck of cards. One egg equals 1 oz (28 g).  ? 2-3 servings of low-fat dairy each day. One serving is 1 cup (237 mL).  ? 1 serving of nuts, seeds, or beans 5 times each week.  ? 2-3 servings of heart-healthy fats. Healthy fats called omega-3 fatty acids are found in foods such as walnuts, flaxseeds, fortified milks, and eggs. These fats are also found in cold-water fish, such as sardines, salmon, and mackerel.  2. Limit how much you eat of:  ? Canned or prepackaged foods.  ? Food that is high in trans fat, such as some fried foods.  ? Food that is high in saturated fat, such as fatty meat.  ? Desserts and other sweets, sugary drinks, and other foods with added sugar.  ? Full-fat dairy products.  3. Do not salt foods before eating.  4. Do not eat more than 4 egg yolks a week.  5. Try to eat at least 2 vegetarian meals a week.  6. Eat more home-cooked food and less restaurant, buffet, and fast food.     Lifestyle  1. When eating at a restaurant, ask that your food be prepared with less salt or no salt, if possible.  2. If you drink alcohol:  1. Limit how much you use to:  § 0-1 drink a day for women who are not pregnant.  § 0-2 drinks a day for men.  2. Be aware of how much alcohol is in your drink. In the [place] one drink equals one  12 oz bottle of beer (355 mL), one 5 oz glass of wine (148 mL), or one 1½ oz glass of hard liquor (44 mL).  General information  · Avoid eating more than 2,300 mg of salt a day. If you have hypertension, you may need to reduce your sodium intake to 1,500 mg a day.  · Work with your health care provider to maintain a healthy body weight or to lose weight. Ask what an ideal weight is for you.  · Get at least 30 minutes of exercise that causes your heart to beat faster (aerobic exercise) most days of the week. Activities may include walking, swimming, or biking.  · Work with your health care provider or dietitian to adjust your eating plan to your individual calorie needs.  What foods should I eat?  Fruits  All fresh, dried, or frozen fruit. Canned fruit in natural juice (without added sugar).  Vegetables  Fresh or frozen vegetables (raw, steamed, roasted, or grilled). Low-sodium or reduced-sodium tomato and vegetable juice. Low-sodium or reduced-sodium tomato sauce and tomato paste. Low-sodium or reduced-sodium canned vegetables.  Grains  Whole-grain or whole-wheat bread. Whole-grain or whole-wheat pasta. Brown rice. Oatmeal. Quinoa. Bulgur. Whole-grain and low-sodium cereals. Bernie bread. Low-fat, low-sodium crackers. Whole-wheat flour tortillas.  Meats and other proteins  Skinless chicken or turkey. Ground chicken or turkey. Pork with fat trimmed off. Fish and seafood. Egg whites. Dried beans, peas, or lentils. Unsalted nuts, nut butters, and seeds. Unsalted canned beans. Lean cuts of beef with fat trimmed off. Low-sodium, lean precooked or cured meat, such as sausages or meat loaves.  Dairy  Low-fat (1%) or fat-free (skim) milk. Reduced-fat, low-fat, or fat-free cheeses. Nonfat, low-sodium ricotta or cottage cheese. Low-fat or nonfat yogurt. Low-fat, low-sodium cheese.  Fats and oils  Soft margarine without trans fats. Vegetable oil. Reduced-fat, low-fat, or light mayonnaise and salad dressings (reduced-sodium).  Canola, safflower, olive, avocado, soybean, and sunflower oils. Avocado.  Seasonings and condiments  Herbs. Spices. Seasoning mixes without salt.  Other foods  Unsalted popcorn and pretzels. Fat-free sweets.  The items listed above may not be a complete list of foods and beverages you can eat. Contact a dietitian for more information.  What foods should I avoid?  Fruits  Canned fruit in a light or heavy syrup. Fried fruit. Fruit in cream or butter sauce.  Vegetables  Creamed or fried vegetables. Vegetables in a cheese sauce. Regular canned vegetables (not low-sodium or reduced-sodium). Regular canned tomato sauce and paste (not low-sodium or reduced-sodium). Regular tomato and vegetable juice (not low-sodium or reduced-sodium). Pickles. Olives.  Grains  Baked goods made with fat, such as croissants, muffins, or some breads. Dry pasta or rice meal packs.  Meats and other proteins  Fatty cuts of meat. Ribs. Fried meat. [place]. Bologna, salami, and other precooked or cured meats, such as sausages or meat loaves. Fat from the back of a pig (fatback). Bratwurst. Salted nuts and seeds. Canned beans with added salt. Canned or smoked fish. Whole eggs or egg yolks. Chicken or turkey with skin.  Dairy  Whole or 2% milk, cream, and half-and-half. Whole or full-fat cream cheese. Whole-fat or sweetened yogurt. Full-fat cheese. Nondairy creamers. Whipped toppings. Processed cheese and cheese spreads.  Fats and oils  Butter. Stick margarine. Lard. Shortening. Ghee. [place]dermott fat. Tropical oils, such as coconut, palm kernel, or palm oil.  Seasonings and condiments  Onion salt, garlic salt, seasoned salt, table salt, and sea salt. Worcestershire sauce. Tartar sauce. Barbecue sauce. Teriyaki sauce. Soy sauce, including reduced-sodium. Steak sauce. Canned and packaged gravies. Fish sauce. Oyster sauce. Cocktail sauce. Store-bought horseradish. Ketchup. Mustard. Meat flavorings and tenderizers. Bouillon cubes. Hot sauces. Pre-made or  packaged marinades. Pre-made or packaged taco seasonings. Relishes. Regular salad dressings.  Other foods  Salted popcorn and pretzels.  The items listed above may not be a complete list of foods and beverages you should avoid. Contact a dietitian for more information.  Where to find more information  · National Heart, Lung, and Blood [place]: www.nhlbi.nih.gov  · American Heart Association: www.heart.org  · Academy of Nutrition and Dietetics: www.eatright.org  · National Kidney Foundation: www.kidney.org  Summary  · The DASH eating plan is a healthy eating plan that has been shown to reduce high blood pressure (hypertension). It may also reduce your risk for type 2 diabetes, heart disease, and stroke.  · When on the DASH eating plan, aim to eat more fresh fruits and vegetables, whole grains, lean proteins, low-fat dairy, and heart-healthy fats.  · With the DASH eating plan, you should limit salt (sodium) intake to 2,300 mg a day. If you have hypertension, you may need to reduce your sodium intake to 1,500 mg a day.  · Work with your health care provider or dietitian to adjust your eating plan to your individual calorie needs.  This information is not intended to replace advice given to you by your health care provider. Make sure you discuss any questions you have with your health care provider.  Document Revised: 11/20/2020 Document Reviewed: 11/20/2020  [employer] Patient Education © 2021 [employer]       Heart-Healthy Eating Plan  Heart-healthy meal planning includes:  · Eating less unhealthy fats.  · Eating more healthy fats.  · Making other changes in your diet.  Talk with your doctor or a diet specialist (dietitian) to create an eating plan that is right for you.  What is my plan?  Your doctor may recommend an eating plan that includes:  · Total fat: ______% or less of total calories a day.  · Saturated fat: ______% or less of total calories a day.  · Cholesterol: less than _________mg a day.  What are tips  for following this plan?  Cooking  Avoid frying your food. Try to bake, boil, grill, or broil it instead. You can also reduce fat by:  · Removing the skin from poultry.  · Removing all visible fats from meats.  · Steaming vegetables in water or broth.  Meal planning       1. At meals, divide your plate into four equal parts:  ? Fill one-half of your plate with vegetables and green salads.  ? Fill one-fourth of your plate with whole grains.  ? Fill one-fourth of your plate with lean protein foods.  2. Eat 4-5 servings of vegetables per day. A serving of vegetables is:  ? 1 cup of raw or cooked vegetables.  ? 2 cups of raw leafy greens.  3. Eat 4-5 servings of fruit per day. A serving of fruit is:  ? 1 medium whole fruit.  ? ¼ cup of dried fruit.  ? ½ cup of fresh, frozen, or canned fruit.  ? ½ cup of 100% fruit juice.  4. Eat more foods that have soluble fiber. These are apples, broccoli, carrots, beans, peas, and barley. Try to get 20-30 g of fiber per day.  5. Eat 4-5 servings of nuts, legumes, and seeds per week:  ? 1 serving of dried beans or legumes equals ½ cup after being cooked.  ? 1 serving of nuts is ¼ cup.  ? 1 serving of seeds equals 1 tablespoon.     General information  · Eat more home-cooked food. Eat less restaurant, buffet, and fast food.  · Limit or avoid alcohol.  · Limit foods that are high in starch and sugar.  · Avoid fried foods.  · Lose weight if you are overweight.  · Keep track of how much salt (sodium) you eat. This is important if you have high blood pressure. Ask your doctor to tell you more about this.  · Try to add vegetarian meals each week.  Fats  1. Choose healthy fats. These include olive oil and canola oil, flaxseeds, walnuts, almonds, and seeds.  2. Eat more omega-3 fats. These include salmon, mackerel, sardines, tuna, flaxseed oil, and ground flaxseeds. Try to eat fish at least 2 times each week.  3. Check food labels. Avoid foods with trans fats or high amounts of saturated  fat.  4. Limit saturated fats.  ? These are often found in animal products, such as meats, butter, and cream.  ? These are also found in plant foods, such as palm oil, palm kernel oil, and coconut oil.  5. Avoid foods with partially hydrogenated oils in them. These have trans fats. Examples are stick margarine, some tub margarines, cookies, crackers, and other baked goods.  What foods can I eat?  Fruits  All fresh, canned (in natural juice), or frozen fruits.  Vegetables  Fresh or frozen vegetables (raw, steamed, roasted, or grilled). Green salads.  Grains  Most grains. Choose whole wheat and whole grains most of the time. Rice and pasta, including brown rice and pastas made with whole wheat.  Meats and other proteins  Lean, well-trimmed beef, veal, pork, and lamb. Chicken and turkey without skin. All fish and shellfish. Wild duck, rabbit, pheasant, and venison. Egg whites or low-cholesterol egg substitutes. Dried beans, peas, lentils, and tofu. Seeds and most nuts.  Dairy  Low-fat or nonfat cheeses, including ricotta and mozzarella. Skim or 1% milk that is liquid, powdered, or evaporated. Buttermilk that is made with low-fat milk. Nonfat or low-fat yogurt.  Fats and oils  Non-hydrogenated (trans-free) margarines. Vegetable oils, including soybean, sesame, sunflower, olive, peanut, safflower, corn, canola, and cottonseed. Salad dressings or mayonnaise made with a vegetable oil.  Beverages  Mineral water. Coffee and tea. Diet carbonated beverages.  Sweets and desserts  Sherbet, gelatin, and fruit ice. Small amounts of dark chocolate.  Limit all sweets and desserts.  Seasonings and condiments  All seasonings and condiments.  The items listed above may not be a complete list of foods and drinks you can eat. Contact a dietitian for more options.  What foods should I avoid?  Fruits  Canned fruit in heavy syrup. Fruit in cream or butter sauce. Fried fruit. Limit coconut.  Vegetables  Vegetables cooked in cheese, cream, or  butter sauce. Fried vegetables.  Grains  Breads that are made with saturated or trans fats, oils, or whole milk. Croissants. Sweet rolls. Donuts. High-fat crackers, such as cheese crackers.  Meats and other proteins  Fatty meats, such as hot dogs, ribs, sausage, fitzgerald, rib-eye roast or steak. High-fat deli meats, such as salami and bologna. Caviar. Domestic duck and goose. Organ meats, such as liver.  Dairy  Cream, sour cream, cream cheese, and creamed cottage cheese. Whole-milk cheeses. Whole or 2% milk that is liquid, evaporated, or condensed. Whole buttermilk. Cream sauce or high-fat cheese sauce. Yogurt that is made from whole milk.  Fats and oils  Meat fat, or shortening. Cocoa butter, hydrogenated oils, palm oil, coconut oil, palm kernel oil. Solid fats and shortenings, including fitzgerald fat, salt pork, lard, and butter. Nondairy cream substitutes. Salad dressings with cheese or sour cream.  Beverages  Regular sodas and juice drinks with added sugar.  Sweets and desserts  Frosting. Pudding. Cookies. Cakes. Pies. Milk chocolate or white chocolate. Buttered syrups. Full-fat ice cream or ice cream drinks.  The items listed above may not be a complete list of foods and drinks to avoid. Contact a dietitian for more information.  Summary  · Heart-healthy meal planning includes eating less unhealthy fats, eating more healthy fats, and making other changes in your diet.  · Eat a balanced diet. This includes fruits and vegetables, low-fat or nonfat dairy, lean protein, nuts and legumes, whole grains, and heart-healthy oils and fats.  This information is not intended to replace advice given to you by your health care provider. Make sure you discuss any questions you have with your health care provider.  Document Revised: 02/21/2019 Document Reviewed: 01/25/2019  [employer] Patient Education © 2021 [employer]       Mediterranean Diet  A Mediterranean diet refers to food and lifestyle choices that are based on the  traditions of countries located on the [place]. This way of eating has been shown to help prevent certain conditions and improve outcomes for people who have chronic diseases, like kidney disease and heart disease.  What are tips for following this plan?  Lifestyle  1. Cook and eat meals together with your family, when possible.  2. Drink enough fluid to keep your urine clear or pale yellow.  3. Be physically active every day. This includes:  ? Aerobic exercise like running or swimming.  ? Leisure activities like gardening, walking, or housework.  4. Get 7-8 hours of sleep each night.  5. If recommended by your health care provider, drink red wine in moderation. This means 1 glass a day for nonpregnant women and 2 glasses a day for men. A glass of wine equals 5 oz (150 mL).  Reading food labels       · Check the serving size of packaged foods. For foods such as rice and pasta, the serving size refers to the amount of cooked product, not dry.  · Check the total fat in packaged foods. Avoid foods that have saturated fat or trans fats.  · Check the ingredients list for added sugars, such as corn syrup.     Shopping  1. At the grocery store, buy most of your food from the areas near the walls of the store. This includes:  ? Fresh fruits and vegetables (produce).  ? Grains, beans, nuts, and seeds. Some of these may be available in unpackaged forms or large amounts (in bulk).  ? Fresh seafood.  ? Poultry and eggs.  ? Low-fat dairy products.  2. Buy whole ingredients instead of prepackaged foods.  3. Buy fresh fruits and vegetables in-season from local farmers markets.  4. Buy frozen fruits and vegetables in resealable bags.  5. If you do not have access to quality fresh seafood, buy precooked frozen shrimp or canned fish, such as tuna, salmon, or sardines.  6. Buy small amounts of raw or cooked vegetables, salads, or olives from the deli or salad bar at your store.  7. Stock your pantry so you always have  certain foods on hand, such as olive oil, canned tuna, canned tomatoes, rice, pasta, and beans.  Cooking  · Cook foods with extra-virgin olive oil instead of using butter or other vegetable oils.  · Have meat as a side dish, and have vegetables or grains as your main dish. This means having meat in small portions or adding small amounts of meat to foods like pasta or stew.  · Use beans or vegetables instead of meat in common dishes like chili or lasagna.  · [place] with different cooking methods. Try roasting or broiling vegetables instead of steaming or sautéeing them.  · Add frozen vegetables to soups, stews, pasta, or rice.  · Add nuts or seeds for added healthy fat at each meal. You can add these to yogurt, salads, or vegetable dishes.  · Marinate fish or vegetables using olive oil, lemon juice, garlic, and fresh herbs.  Meal planning       1. Plan to eat 1 vegetarian meal one day each week. Try to work up to 2 vegetarian meals, if possible.  2. Eat seafood 2 or more times a week.  3. Have healthy snacks readily available, such as:  ? Vegetable sticks with hummus.  ? Greek yogurt.  ? Fruit and nut trail mix.  4. Eat balanced meals throughout the week. This includes:  ? Fruit: 2-3 servings a day  ? Vegetables: 4-5 servings a day  ? Low-fat dairy: 2 servings a day  ? Fish, poultry, or lean meat: 1 serving a day  ? Beans and legumes: 2 or more servings a week  ? Nuts and seeds: 1-2 servings a day  ? Whole grains: 6-8 servings a day  ? Extra-virgin olive oil: 3-4 servings a day  5. Limit red meat and sweets to only a few servings a month     What are my food choices?  1. Mediterranean diet  1. Recommended  § Grains: Whole-grain pasta. Brown rice. Bulgar wheat. Polenta. Couscous. Whole-wheat bread. Oatmeal. Quinoa.  § Vegetables: Artichokes. Beets. Broccoli. Cabbage. Carrots. Eggplant. Green beans. Chard. Kale. Spinach. Onions. Leeks. Peas. Squash. Tomatoes. Peppers. Radishes.  § Fruits: Apples. Apricots. Avocado.  Berries. Bananas. Cherries. Dates. Figs. Grapes. [place]. Melon. Oranges. Peaches. Plums. Pomegranate.  § Meats and other protein foods: Beans. Almonds. Sunflower seeds. Pine nuts. Peanuts. Cod. [place]. Scallops. Shrimp. Tuna. Tilapia. Clams. Oysters. Eggs.  § Dairy: Low-fat milk. Cheese. Greek yogurt.  § Beverages: Water. Red wine. Herbal tea.  § Fats and oils: Extra virgin olive oil. Avocado oil. Grape seed oil.  § Sweets and desserts: Greek yogurt with honey. Baked apples. Poached pears. Trail mix.  § Seasoning and other foods: Basil. Cilantro. Coriander. Cumin. Mint. Parsley. Lalit. Rosemary. Tarragon. Garlic. Oregano. Thyme. Pepper. Balsalmic vinegar. Tahini. Hummus. Tomato sauce. Olives. Mushrooms.  2. Limit these  § Grains: Prepackaged pasta or rice dishes. Prepackaged cereal with added sugar.  § Vegetables: Deep fried potatoes (french fries).  § Fruits: Fruit canned in syrup.  § Meats and other protein foods: Beef. Pork. Lamb. Poultry with skin. Hot dogs. Mcdermott.  § Dairy: Ice cream. Sour cream. Whole milk.  § Beverages: Juice. Sugar-sweetened soft drinks. Beer. Liquor and spirits.  § Fats and oils: Butter. Canola oil. Vegetable oil. Beef fat (tallow). Lard.  § Sweets and desserts: Cookies. Cakes. Pies. Candy.  § Seasoning and other foods: Mayonnaise. Premade sauces and marinades.  The items listed may not be a complete list. Talk with your dietitian about what dietary choices are right for you.  Summary  · The Mediterranean diet includes both food and lifestyle choices.  · Eat a variety of fresh fruits and vegetables, beans, nuts, seeds, and whole grains.  · Limit the amount of red meat and sweets that you eat.  · Talk with your health care provider about whether it is safe for you to drink red wine in moderation. This means 1 glass a day for nonpregnant women and 2 glasses a day for men. A glass of wine equals 5 oz (150 mL).  This information is not intended to replace advice given to you by your health care  provider. Make sure you discuss any questions you have with your health care provider.  Document Revised: 08/17/2017 Document Reviewed: 08/10/2017  [employer] Patient Education © 2020 [employer]         Exercising to Stay Healthy  To become healthy and stay healthy, it is recommended that you do moderate-intensity and vigorous-intensity exercise. You can tell that you are exercising at a moderate intensity if your heart starts beating faster and you start breathing faster but can still hold a conversation. You can tell that you are exercising at a vigorous intensity if you are breathing much harder and faster and cannot hold a conversation while exercising.  Exercising regularly is important. It has many health benefits, such as:  · Improving overall fitness, flexibility, and endurance.  · Increasing bone density.  · Helping with weight control.  · Decreasing body fat.  · Increasing muscle strength.  · Reducing stress and tension.  · Improving overall health.  How often should I exercise?  Choose an activity that you enjoy, and set realistic goals. Your health care provider can help you make an activity plan that works for you.  Exercise regularly as told by your health care provider. This may include:  1. Doing strength training two times a week, such as:  ? Lifting weights.  ? Using resistance bands.  ? Push-ups.  ? Sit-ups.  ? Yoga.  2. Doing a certain intensity of exercise for a given amount of time. Choose from these options:  ? A total of 150 minutes of moderate-intensity exercise every week.  ? A total of 75 minutes of vigorous-intensity exercise every week.  ? A mix of moderate-intensity and vigorous-intensity exercise every week.  Children, pregnant women, people who have not exercised regularly, people who are overweight, and older adults may need to talk with a health care provider about what activities are safe to do. If you have a medical condition, be sure to talk with your health care provider before  you start a new exercise program.  What are some exercise ideas?    Moderate-intensity exercise ideas include:  · Walking 1 mile (1.6 km) in about 15 minutes.  · Biking.  · Hiking.  · Golfing.  · Dancing.  · Water aerobics.  Vigorous-intensity exercise ideas include:  · Walking 4.5 miles (7.2 km) or more in about 1 hour.  · Jogging or running 5 miles (8 km) in about 1 hour.  · Biking 10 miles (16.1 km) or more in about 1 hour.  · Lap swimming.  · Roller-skating or in-line skating.  · Cross-country skiing.  · Vigorous competitive sports, such as football, basketball, and soccer.  · Jumping rope.  · Aerobic dancing.  What are some everyday activities that can help me to get exercise?  1. Yard work, such as:  ? Pushing a .  ? Raking and bagging leaves.  2. Washing your car.  3. Pushing a stroller.  4. Shoveling snow.  5. Gardening.  6. Washing windows or floors.  How can I be more active in my day-to-day activities?  · Use stairs instead of an elevator.  · Take a walk during your lunch break.  · If you drive, park your car farther away from your work or school.  · If you take public transportation, get off one stop early and walk the rest of the way.  · Stand up or walk around during all of your indoor phone calls.  · Get up, stretch, and walk around every 30 minutes throughout the day.  · Enjoy exercise with a friend. Support to continue exercising will help you keep a regular routine of activity.  What guidelines can I follow while exercising?  · Before you start a new exercise program, talk with your health care provider.  · Do not exercise so much that you hurt yourself, feel dizzy, or get very short of breath.  · Wear comfortable clothes and wear shoes with good support.  · Drink plenty of water while you exercise to prevent dehydration or heat stroke.  · Work out until your breathing and your heartbeat get faster.  Where to find more information  · [place] Department of Health and Human Services:  www.hhs.gov  · Centers for Disease Control and Prevention (CDC): www.cdc.gov  Summary  · Exercising regularly is important. It will improve your overall fitness, flexibility, and endurance.  · Regular exercise also will improve your overall health. It can help you control your weight, reduce stress, and improve your bone density.  · Do not exercise so much that you hurt yourself, feel dizzy, or get very short of breath.  · Before you start a new exercise program, talk with your health care provider.  This information is not intended to replace advice given to you by your health care provider. Make sure you discuss any questions you have with your health care provider.  Document Revised: 11/30/2018 Document Reviewed: 11/08/2018  [employer] Patient Education © 2021 [employer]           Mindfulness-Based Stress Reduction  Mindfulness-based stress reduction (MBSR) is a program that helps people learn to practice mindfulness. Mindfulness is the practice of intentionally paying attention to the present moment. It can be learned and practiced through techniques such as education, breathing exercises, meditation, and yoga. MBSR includes several mindfulness techniques in one program.  MBSR works best when you understand the treatment, are willing to try new things, and can commit to spending time practicing what you learn. MBSR training may include learning about:  · How your emotions, thoughts, and reactions affect your body.  · New ways to respond to things that cause negative thoughts to start (triggers).  · How to notice your thoughts and let go of them.  · Practicing awareness of everyday things that you normally do without thinking.  · The techniques and goals of different types of meditation.  What are the benefits of MBSR?  MBSR can have many benefits, which include helping you to:  · Develop self-awareness. This refers to knowing and understanding yourself.  · Learn skills and attitudes that help you to participate  in your own health care.  · Learn new ways to care for yourself.  · Be more accepting about how things are, and let things go.  · Be less judgmental and approach things with an open mind.  · Be patient with yourself and trust yourself more.  MBSR has also been shown to:  · Reduce negative emotions, such as depression and anxiety.  · Improve memory and focus.  · Change how you sense and approach pain.  · Boost your body's ability to fight infections.  · Help you connect better with other people.  · Improve your sense of well-being.  Follow these instructions at home:       1. Find a local in-person or online MBSR program.  2. Set aside some time regularly for mindfulness practice.  3. Find a mindfulness practice that works best for you. This may include one or more of the following:  ? Meditation. Meditation involves focusing your mind on a certain thought or activity.  ? Breathing awareness exercises. These help you to stay present by focusing on your breath.  ? Body scan. For this practice, you lie down and pay attention to each part of your body from head to toe. You can identify tension and soreness and intentionally relax parts of your body.  ? Yoga. Yoga involves stretching and breathing, and it can improve your ability to move and be flexible. It can also provide an experience of testing your body's limits, which can help you release stress.  ? Mindful eating. This way of eating involves focusing on the taste, texture, color, and smell of each bite of food. Because this slows down eating and helps you feel full sooner, it can be an important part of a weight-loss plan.  4. Find a podcast or recording that provides guidance for breathing awareness, body scan, or meditation exercises. You can listen to these any time when you have a free moment to rest without distractions.  5. Follow your treatment plan as told by your health care provider. This may include taking regular medicines and making changes to your  diet or lifestyle as recommended.  How to practice mindfulness  To do a basic awareness exercise:  · Find a comfortable place to sit.  · Pay attention to the present moment. Observe your thoughts, feelings, and surroundings just as they are.  · Avoid placing judgment on yourself, your feelings, or your surroundings. Make note of any judgment that comes up, and let it go.  · Your mind may wander, and that is okay. Make note of when your thoughts drift, and return your attention to the present moment.  To do basic mindfulness meditation:  1. Find a comfortable place to sit. This may include a stable chair or a firm floor cushion.  ? Sit upright with your back straight. Let your arms fall next to your side with your hands resting on your legs.  ? If sitting in a chair, rest your feet flat on the floor.  ? If sitting on a cushion, cross your legs in front of you.  2. Keep your head in a neutral position with your chin dropped slightly. Relax your jaw and rest the tip of your tongue on the roof of your mouth. Drop your gaze to the floor. You can close your eyes if you like.  3. Breathe normally and pay attention to your breath. Feel the air moving in and out of your nose. Feel your belly expanding and relaxing with each breath.  4. Your mind may wander, and that is okay. Make note of when your thoughts drift, and return your attention to your breath.  5. Avoid placing judgment on yourself, your feelings, or your surroundings. Make note of any judgment or feelings that come up, let them go, and bring your attention back to your breath.  6. When you are ready, lift your gaze or open your eyes. Pay attention to how your body feels after the meditation.  Where to find more information  You can find more information about MBSR from:  · Your health care provider.  · Community-based meditation centers or programs.  · Programs offered near you.  Summary  · Mindfulness-based stress reduction (MBSR) is a program that teaches you  how to intentionally pay attention to the present moment. It is used with other treatments to help you cope better with daily stress, emotions, and pain.  · MBSR focuses on developing self-awareness, which allows you to respond to life stress without judgment or negative emotions.  · MBSR programs may involve learning different mindfulness practices, such as breathing exercises, meditation, yoga, body scan, or mindful eating. Find a mindfulness practice that works best for you, and set aside time for it on a regular basis.  This information is not intended to replace advice given to you by your health care provider. Make sure you discuss any questions you have with your health care provider.  Document Revised: 02/22/2021 Document Reviewed: 04/26/2018  [employer] Patient Education © 2021 [employer]

## 2022-02-18 NOTE — PROGRESS NOTES
The ABCs of the Annual Wellness Visit  Subsequent Medicare Wellness Visit    Chief Complaint   Patient presents with   • Medicare Wellness-subsequent      Subjective    History of Present Illness:  Victor Manuel Mendoza is a 71 y.o. male who presents for a Subsequent Medicare Wellness Visit.    The following portions of the patient's history were reviewed and   updated as appropriate: allergies, current medications, past family history, past medical history, past social history, past surgical history and problem list.    Reports overall health is unchanged in the last year, but reports hasn't been exercising as avid    Compared to one year ago, the patient feels his physical   health is the same.    Compared to one year ago, the patient feels his mental   health is the same.    Recent Hospitalizations:  He was admitted within the past 365 days at [facility] 10/10/2021 to 10/12/2021 for NSTEMI, and is now s/p PCI go distal LAD and PL branch of RCA with BURKE.   Also had electrophysiology study 9/20/2021 for his paroxysmal atrial fib.      Current Medical Providers:  Patient Care Team:  Ciro Bland MD as PCP - General (Internal Medicine)  Danielle Scott MD as Consulting Physician (Cardiology)  Clarence Ramírez MD as Consulting Physician (Cardiac Electrophysiology)    Outpatient Medications Prior to Visit   Medication Sig Dispense Refill   • clopidogrel (PLAVIX) 75 MG tablet Take 75 mg by mouth Daily.     • finasteride (PROSCAR) 5 MG tablet TAKE 1 TABLET BY MOUTH EVERY DAY (Patient taking differently: Take 5 mg by mouth Daily.) 90 tablet 3   • Flaxseed, Linseed, (FLAX SEED OIL) 1300 MG capsule Take  by mouth.     • irbesartan (AVAPRO) 300 MG tablet Take 1 tablet by mouth Daily. Hold til seen by pcp monitor bp at home 90 tablet 3   • levothyroxine (SYNTHROID, LEVOTHROID) 25 MCG tablet TAKE 1.5 TABLETS BY MOUTH EVERY DAY (Patient taking differently: Take 37.5 mcg by mouth Daily.) 135 tablet 3   • Multiple  "Vitamin (MULTI-VITAMIN DAILY PO) Take  by mouth.     • pravastatin (PRAVACHOL) 40 MG tablet Take 1 tablet by mouth Daily. 90 tablet 1   • sulfaSALAzine (AZULFIDINE) 500 MG tablet TAKE 1 TABLET BY MOUTH TWICE DAILY 180 tablet 0   • vitamin B-12 (CYANOCOBALAMIN) 1000 MCG tablet Take 1,000 mcg by mouth Daily.     • warfarin (COUMADIN) 5 MG tablet Take 1 tablet by mouth Daily. 90 tablet 2   • Combigan 0.2-0.5 % ophthalmic solution      • methylPREDNISolone (Medrol) 4 MG dose pack Take as directed on package instructions. 21 each 0   • metoprolol succinate XL (TOPROL-XL) 25 MG 24 hr tablet Take 1 tablet by mouth Daily. 30 tablet 11     No facility-administered medications prior to visit.       No opioid medication identified on active medication list. I have reviewed chart for other potential  high risk medication/s and harmful drug interactions in the elderly.          Aspirin is not on active medication list.  Aspirin use is not indicated based on review of current medical condition/s. Risk of harm outweighs potential benefits.  .    Patient Active Problem List   Diagnosis   • Paroxysmal atrial fibrillation   • Essential hypertension   • Hypothyroidism   • Hyperlipidemia LDL goal <70   • Dizziness   • Benign prostatic hyperplasia without lower urinary tract symptoms   • Memory changes   • Tachy-staci syndrome (HCC)   • Presence of cardiac pacemaker   • S/P coronary artery stent placement   • Idiopathic scoliosis and kyphoscoliosis   • Dry eyes   • Chronic anticoagulation     Advance Care Planning  Advance Directive is not on file.  ACP discussion was held with the patient during this visit. Patient does not have an advance directive, information provided.          Objective    Vitals:    02/18/22 1051   BP: 145/92   Pulse: 72   Temp: 97.7 °F (36.5 °C)   TempSrc: Temporal   SpO2: 98%   Weight: 81 kg (178 lb 9.6 oz)   Height: 180.3 cm (71\")   PainSc:   3     BMI Readings from Last 1 Encounters:   02/18/22 24.91 kg/m² "   BMI is within normal parameters. No follow-up required.    Does the patient have evidence of cognitive impairment? Yes    Physical Exam  Constitutional:       General: He is not in acute distress.     Appearance: Normal appearance. He is normal weight. He is not ill-appearing or toxic-appearing.   HENT:      Head: Normocephalic.   Eyes:      Conjunctiva/sclera: Conjunctivae normal.   Cardiovascular:      Rate and Rhythm: Normal rate and regular rhythm.      Pulses: Normal pulses.      Heart sounds: Normal heart sounds. No murmur heard.      Pulmonary:      Effort: Pulmonary effort is normal. No respiratory distress.      Breath sounds: Normal breath sounds. No wheezing.   Abdominal:      General: Abdomen is flat.      Palpations: Abdomen is soft.      Tenderness: There is no abdominal tenderness.   Musculoskeletal:      Right lower leg: No edema.   Skin:     General: Skin is warm and dry.   Neurological:      General: No focal deficit present.      Mental Status: He is alert. Mental status is at baseline.   Psychiatric:         Mood and Affect: Mood normal.         Behavior: Behavior normal.         Thought Content: Thought content normal.         Judgment: Judgment normal.             MOCA testing today: score of 22    HEALTH RISK ASSESSMENT    Smoking Status:  Social History     Tobacco Use   Smoking Status Never Smoker   Smokeless Tobacco Never Used     Alcohol Consumption:  Social History     Substance and Sexual Activity   Alcohol Use Yes   • Alcohol/week: 0.0 standard drinks    Comment: 5-6 beers a week, couple shots of bourbon a week     Fall Risk Screen:    STEADI Fall Risk Assessment was completed, and patient is at MODERATE risk for falls. Assessment completed on:2/18/2022    Depression Screening:  PHQ-2/PHQ-9 Depression Screening 2/18/2022   Little interest or pleasure in doing things 0   Feeling down, depressed, or hopeless 0   Total Score 0       Health Habits and Functional and Cognitive  Screening:  Functional & Cognitive Status 2/18/2022   Do you have difficulty preparing food and eating? No   Do you have difficulty bathing yourself, getting dressed or grooming yourself? No   Do you have difficulty using the toilet? No   Do you have difficulty moving around from place to place? No   Do you have trouble with steps or getting out of a bed or a chair? No   Current Diet Well Balanced Diet   Dental Exam Up to date   Eye Exam Up to date   Exercise (times per week) 4 times per week   Current Exercises Include Other   Do you need help using the phone?  No   Are you deaf or do you have serious difficulty hearing?  Yes   Do you need help with transportation? No   Do you need help shopping? No   Do you need help preparing meals?  No   Do you need help with housework?  No   Do you need help with laundry? No   Do you need help taking your medications? No   Do you need help managing money? No   Do you ever drive or ride in a car without wearing a seat belt? No   Have you felt unusual stress, anger or loneliness in the last month? No   Who do you live with? Spouse   If you need help, do you have trouble finding someone available to you? No   Do you have difficulty concentrating, remembering or making decisions? No       Age-appropriate Screening Schedule:  Refer to the list below for future screening recommendations based on patient's age, sex and/or medical conditions. Orders for these recommended tests are listed in the plan section. The patient has been provided with a written plan.    Health Maintenance   Topic Date Due   • TDAP/TD VACCINES (1 - Tdap) Never done   • LIPID PANEL  10/11/2022   • INFLUENZA VACCINE  Completed   • ZOSTER VACCINE  Completed              Assessment/Plan   CMS Preventative Services Quick Reference  Risk Factors Identified During Encounter  Cardiovascular Disease  Dementia/Memory   Inactivity/Sedentary  The above risks/problems have been discussed with the patient.  Follow up  actions/plans if indicated are seen below in the Assessment/Plan Section.  Pertinent information has been shared with the patient in the After Visit Summary.    Diagnoses and all orders for this visit:    1. Medicare annual wellness visit, subsequent (Primary)  -     CBC & Differential  -     Comprehensive Metabolic Panel  -     Lipid Panel  -     TSH  -     PSA Screen    2. Other specified hypothyroidism  -     TSH    3. Hyperlipidemia LDL goal <70  -     Lipid Panel    4. S/P coronary artery stent placement    5. Essential hypertension  -     Comprehensive Metabolic Panel    6. Chronic anticoagulation    7. Pacemaker    8. Other persistent atrial fibrillation (HCC)  -     TSH    9. Tachy-staci syndrome (HCC)    10. HODAN on CPAP    11. Encounter for screening for malignant neoplasm of prostate   -     PSA Screen    12. Mild cognitive impairment      HTN:  -recommended keeping BP log x 1-2 weeks  -will follow up in 3 months, and asked to drop off Bp log in the interim  -reviewed low sodium counseling today    CAD, s/p BURKE, history of NSTEMI:  -on plavix    Paroxysmal A fib:  -on warfarin    Tachy-staci:  -s/p pacemaker    Mild Cognitive Impairment:  -MOCA today of 22    Health Maintenance:  -nutritional and exercise counseling today (nutritional counseling on the components of the Mediterranean diet)  -low sodium dietary teaching today    Follow Up:   Return in about 3 months (around 5/18/2022) for Hypertension.     An After Visit Summary and PPPS were made available to the patient.

## 2022-02-22 DIAGNOSIS — E78.5 DYSLIPIDEMIA: Chronic | ICD-10-CM

## 2022-02-22 RX ORDER — PRAVASTATIN SODIUM 40 MG
40 TABLET ORAL DAILY
Qty: 90 TABLET | Refills: 1 | OUTPATIENT
Start: 2022-02-22

## 2022-02-23 RX ORDER — PRAVASTATIN SODIUM 40 MG
40 TABLET ORAL DAILY
Qty: 90 TABLET | Refills: 2 | Status: SHIPPED | OUTPATIENT
Start: 2022-02-23

## 2022-03-21 PROCEDURE — 93296 REM INTERROG EVL PM/IDS: CPT | Performed by: INTERNAL MEDICINE

## 2022-03-21 PROCEDURE — 93294 REM INTERROG EVL PM/LDLS PM: CPT | Performed by: INTERNAL MEDICINE

## 2022-03-24 ENCOUNTER — LAB (OUTPATIENT)
Dept: LAB | Facility: HOSPITAL | Age: 72
End: 2022-03-24

## 2022-03-24 DIAGNOSIS — I48.0 PAROXYSMAL ATRIAL FIBRILLATION: ICD-10-CM

## 2022-03-24 LAB
INR PPP: 2.36 (ref 2–3)
PROTHROMBIN TIME: 22.8 SECONDS (ref 9.4–12)

## 2022-03-24 PROCEDURE — 36415 COLL VENOUS BLD VENIPUNCTURE: CPT

## 2022-03-24 PROCEDURE — 85610 PROTHROMBIN TIME: CPT

## 2022-03-31 ENCOUNTER — TELEPHONE (OUTPATIENT)
Dept: CARDIOLOGY | Facility: CLINIC | Age: 72
End: 2022-03-31

## 2022-03-31 ENCOUNTER — ANTICOAGULATION VISIT (OUTPATIENT)
Dept: CARDIOLOGY | Facility: CLINIC | Age: 72
End: 2022-03-31

## 2022-03-31 DIAGNOSIS — I48.0 PAROXYSMAL ATRIAL FIBRILLATION: Primary | ICD-10-CM

## 2022-03-31 NOTE — TELEPHONE ENCOUNTER
----- Message from AMANDA Lee sent at 3/31/2022  1:08 PM EDT -----      ----- Message -----  From: Jacoby Lux  Sent: 3/31/2022  12:41 PM EDT  To: AMANDA Lee

## 2022-05-18 ENCOUNTER — OFFICE VISIT (OUTPATIENT)
Dept: INTERNAL MEDICINE | Facility: CLINIC | Age: 72
End: 2022-05-18

## 2022-05-18 VITALS
BODY MASS INDEX: 25.81 KG/M2 | HEART RATE: 70 BPM | WEIGHT: 184.4 LBS | TEMPERATURE: 98 F | SYSTOLIC BLOOD PRESSURE: 129 MMHG | DIASTOLIC BLOOD PRESSURE: 80 MMHG | OXYGEN SATURATION: 97 % | HEIGHT: 71 IN

## 2022-05-18 DIAGNOSIS — I48.19 OTHER PERSISTENT ATRIAL FIBRILLATION: ICD-10-CM

## 2022-05-18 DIAGNOSIS — Z99.89 OSA ON CPAP: ICD-10-CM

## 2022-05-18 DIAGNOSIS — G47.33 OSA ON CPAP: ICD-10-CM

## 2022-05-18 DIAGNOSIS — Z79.01 CHRONIC ANTICOAGULATION: ICD-10-CM

## 2022-05-18 DIAGNOSIS — I10 ESSENTIAL HYPERTENSION: Primary | ICD-10-CM

## 2022-05-18 LAB
ANION GAP SERPL CALCULATED.3IONS-SCNC: 13.7 MMOL/L (ref 5–15)
BUN SERPL-MCNC: 16 MG/DL (ref 8–23)
BUN/CREAT SERPL: 15.2 (ref 7–25)
CALCIUM SPEC-SCNC: 9.1 MG/DL (ref 8.6–10.5)
CHLORIDE SERPL-SCNC: 99 MMOL/L (ref 98–107)
CO2 SERPL-SCNC: 21.3 MMOL/L (ref 22–29)
CREAT SERPL-MCNC: 1.05 MG/DL (ref 0.76–1.27)
EGFRCR SERPLBLD CKD-EPI 2021: 75 ML/MIN/1.73
GLUCOSE SERPL-MCNC: 85 MG/DL (ref 65–99)
INR PPP: 2.4 (ref 0.9–1.1)
POTASSIUM SERPL-SCNC: 4 MMOL/L (ref 3.5–5.2)
SODIUM SERPL-SCNC: 134 MMOL/L (ref 136–145)

## 2022-05-18 PROCEDURE — 80048 BASIC METABOLIC PNL TOTAL CA: CPT | Performed by: STUDENT IN AN ORGANIZED HEALTH CARE EDUCATION/TRAINING PROGRAM

## 2022-05-18 PROCEDURE — 85610 PROTHROMBIN TIME: CPT | Performed by: STUDENT IN AN ORGANIZED HEALTH CARE EDUCATION/TRAINING PROGRAM

## 2022-05-18 PROCEDURE — 99214 OFFICE O/P EST MOD 30 MIN: CPT | Performed by: STUDENT IN AN ORGANIZED HEALTH CARE EDUCATION/TRAINING PROGRAM

## 2022-05-18 PROCEDURE — 36416 COLLJ CAPILLARY BLOOD SPEC: CPT | Performed by: STUDENT IN AN ORGANIZED HEALTH CARE EDUCATION/TRAINING PROGRAM

## 2022-05-18 RX ORDER — SULFASALAZINE 500 MG/1
500 TABLET ORAL 2 TIMES DAILY
Qty: 180 TABLET | Refills: 0 | Status: SHIPPED | OUTPATIENT
Start: 2022-05-18

## 2022-05-18 NOTE — PROGRESS NOTES
"Chief Complaint  Follow-up and Hypertension    Subjective          Victor Manuel Mendoza presents to [facility]  History of Present Illness    Moved to [place] in interim, but planning on move to [place] July this year.    Hasn't been checking BP.  Adherent to low sodium diet.    Exercises regularly (calisthenics and walking).      Current Outpatient Medications   Medication Instructions   • Combigan 0.2-0.5 % ophthalmic solution No dose, route, or frequency recorded.   • finasteride (PROSCAR) 5 MG tablet TAKE 1 TABLET BY MOUTH EVERY DAY   • Flaxseed, Linseed, (FLAX SEED OIL) 1300 MG capsule Oral   • irbesartan (AVAPRO) 300 mg, Oral, Daily, Hold til seen by pcp monitor bp at home   • levothyroxine (SYNTHROID, LEVOTHROID) 25 MCG tablet TAKE 1.5 TABLETS BY MOUTH EVERY DAY   • metoprolol succinate XL (TOPROL-XL) 25 mg, Oral, Daily   • Multiple Vitamin (MULTI-VITAMIN DAILY PO) Oral   • pravastatin (PRAVACHOL) 40 mg, Oral, Daily   • rivaroxaban (XARELTO) 20 mg, Oral, Daily With Dinner   • sulfaSALAzine (AZULFIDINE) 500 mg, Oral, 2 Times Daily   • vitamin B-12 (CYANOCOBALAMIN) 1,000 mcg, Oral, Daily   • warfarin (COUMADIN) 5 mg, Oral, Daily       The following portions of the patient's history were reviewed and updated as appropriate: allergies, current medications, past family history, past medical history, past social history, past surgical history, and problem list.    Objective   Vital Signs:   /80   Pulse 70   Temp 98 °F (36.7 °C) (Temporal)   Ht 180.3 cm (71\")   Wt 83.6 kg (184 lb 6.4 oz)   SpO2 97%   BMI 25.72 kg/m²     Wt Readings from Last 3 Encounters:   05/18/22 83.6 kg (184 lb 6.4 oz)   02/18/22 81 kg (178 lb 9.6 oz)   01/12/22 81.2 kg (179 lb)     BP Readings from Last 3 Encounters:   05/18/22 129/80   02/18/22 145/92   01/12/22 123/78     Physical Exam   Appearance: No acute distress, well-nourished  Head: normocephalic, atraumatic  Eyes: no scleral " icterus, no conjunctival injection  Ears, Nose, and Throat: external ears normal, nares patent, moist mucous membranes  Cardiovascular: regular rate and rhythm. no murmurs, rubs, or gallops. Trace lower extremity edema bilaterally  Respiratory: breathing comfortably, symmetric chest rise, clear to auscultation bilaterally. No wheezes, rales, or rhonchi.  GI: soft, NTTP, no masses or HSM  Neuro: alert and oriented  Psych: normal mood and affect     Result Review :   The following data was reviewed by: Ciro Bland MD on 05/18/2022:  Common labs    Common Labsle 10/12/21 10/12/21 10/19/21 2/18/22 2/18/22 2/18/22 2/18/22    0502 0502  1208 1208 1208 1208   Glucose  93 84    74   BUN  16 15    18   Creatinine  1.13 1.08    1.08   eGFR Non  Am  64 67    67   Sodium  136 141    139   Potassium  3.9 4.5    4.8   Chloride  103 104    105   Calcium  8.3 (A) 9.3    8.7   Albumin   4.90    4.20   Total Bilirubin   0.7    0.5   Alkaline Phosphatase   39    45   AST (SGOT)   29    33   ALT (SGPT)   18    23   WBC 8.79   4.93      Hemoglobin 11.9 (A)   12.6 (A)      Hematocrit 35.9 (A)   37.7      Platelets 205   245      Total Cholesterol     170     Triglycerides     78     HDL Cholesterol     56     LDL Cholesterol      99     PSA      2.070    (A) Abnormal value       Comments are available for some flowsheets but are not being displayed.                  Lab Results   Component Value Date    COVID19 Not Detected 09/17/2021    INR 2.4 (A) 05/18/2022       Procedures        Assessment and Plan    Diagnoses and all orders for this visit:    1. Essential hypertension (Primary)  -     Basic metabolic panel    2. Chronic anticoagulation  -     POC INR    3. Other persistent atrial fibrillation (HCC)  -     POC INR    4. HODAN on CPAP    Other orders  -     sulfaSALAzine (AZULFIDINE) 500 MG tablet; Take 1 tablet by mouth 2 (Two) Times a Day.  Dispense: 180 tablet; Refill: 0      HTN:  -BP at goal  -will continue current  regimen  -will check BMP today  -low sodium dietary teaching today    Chronic Anticoagulation:  -INR at goal    Medications Discontinued During This Encounter   Medication Reason   • clopidogrel (PLAVIX) 75 MG tablet *Therapy completed   • methylPREDNISolone (Medrol) 4 MG dose pack *Therapy completed   • sulfaSALAzine (AZULFIDINE) 500 MG tablet Reorder          Follow Up   Return in about 6 months (around 11/18/2022) for HTN.  Patient was given instructions and counseling regarding his condition or for health maintenance advice. Please see specific information pulled into the AVS if appropriate.       Ciro Bland MD  05/18/22  17:43 EDT

## 2022-05-27 ENCOUNTER — TELEPHONE (OUTPATIENT)
Dept: CARDIOLOGY | Facility: CLINIC | Age: 72
End: 2022-05-27

## 2022-06-02 ENCOUNTER — TELEPHONE (OUTPATIENT)
Dept: CARDIOLOGY | Facility: CLINIC | Age: 72
End: 2022-06-02

## 2022-06-02 ENCOUNTER — ANTICOAGULATION VISIT (OUTPATIENT)
Dept: CARDIOLOGY | Facility: CLINIC | Age: 72
End: 2022-06-02

## 2022-06-02 ENCOUNTER — LAB (OUTPATIENT)
Dept: LAB | Facility: HOSPITAL | Age: 72
End: 2022-06-02

## 2022-06-02 DIAGNOSIS — I48.0 PAROXYSMAL ATRIAL FIBRILLATION: ICD-10-CM

## 2022-06-02 DIAGNOSIS — I48.0 PAROXYSMAL ATRIAL FIBRILLATION: Primary | ICD-10-CM

## 2022-06-02 LAB
INR PPP: 2.15 (ref 0.86–1.15)
PROTHROMBIN TIME: 24.4 SECONDS (ref 11.8–14.9)

## 2022-06-02 PROCEDURE — 36415 COLL VENOUS BLD VENIPUNCTURE: CPT

## 2022-06-02 PROCEDURE — 85610 PROTHROMBIN TIME: CPT

## 2022-06-02 NOTE — PROGRESS NOTES
Lab Results   Component Value Date    INR 2.15 (H) 06/02/2022    INR 2.4 (A) 05/18/2022    INR 2.36 03/24/2022    PROTIME 24.4 (H) 06/02/2022    PROTIME 22.8 (H) 03/24/2022    PROTIME 23.0 (H) 02/16/2022

## 2022-06-02 NOTE — TELEPHONE ENCOUNTER
L/m to c/b to ask him what dose of warfarin he's currently taking so we can address his INR correctly.

## 2022-06-03 ENCOUNTER — OFFICE VISIT (OUTPATIENT)
Dept: CARDIOLOGY | Facility: CLINIC | Age: 72
End: 2022-06-03

## 2022-06-03 VITALS
SYSTOLIC BLOOD PRESSURE: 143 MMHG | HEIGHT: 71 IN | HEART RATE: 70 BPM | DIASTOLIC BLOOD PRESSURE: 73 MMHG | BODY MASS INDEX: 25.62 KG/M2 | WEIGHT: 183 LBS

## 2022-06-03 DIAGNOSIS — I48.21 PERMANENT ATRIAL FIBRILLATION: ICD-10-CM

## 2022-06-03 DIAGNOSIS — I25.10 CORONARY ARTERY DISEASE INVOLVING NATIVE CORONARY ARTERY OF NATIVE HEART WITHOUT ANGINA PECTORIS: Primary | ICD-10-CM

## 2022-06-03 DIAGNOSIS — Z95.5 S/P CORONARY ARTERY STENT PLACEMENT: ICD-10-CM

## 2022-06-03 DIAGNOSIS — Z79.01 WARFARIN ANTICOAGULATION: ICD-10-CM

## 2022-06-03 DIAGNOSIS — Z95.0 PRESENCE OF CARDIAC PACEMAKER: ICD-10-CM

## 2022-06-03 PROCEDURE — 99214 OFFICE O/P EST MOD 30 MIN: CPT | Performed by: INTERNAL MEDICINE

## 2022-06-03 RX ORDER — CLOPIDOGREL BISULFATE 75 MG/1
75 TABLET ORAL DAILY
Qty: 90 TABLET | Refills: 1 | Status: SHIPPED | OUTPATIENT
Start: 2022-06-03

## 2022-06-03 NOTE — PROGRESS NOTES
Left pt a detailed message.  Informed pt to call the office and let us know he received the message.

## 2022-06-16 ENCOUNTER — ANTICOAGULATION VISIT (OUTPATIENT)
Dept: CARDIOLOGY | Facility: CLINIC | Age: 72
End: 2022-06-16

## 2022-06-16 ENCOUNTER — LAB (OUTPATIENT)
Dept: LAB | Facility: HOSPITAL | Age: 72
End: 2022-06-16

## 2022-06-16 DIAGNOSIS — I48.0 PAROXYSMAL ATRIAL FIBRILLATION: Primary | ICD-10-CM

## 2022-06-16 DIAGNOSIS — I48.0 PAROXYSMAL ATRIAL FIBRILLATION: ICD-10-CM

## 2022-06-16 LAB
INR PPP: 1.9 (ref 0.86–1.15)
PROTHROMBIN TIME: 22.1 SECONDS (ref 11.8–14.9)

## 2022-06-16 PROCEDURE — 36415 COLL VENOUS BLD VENIPUNCTURE: CPT

## 2022-06-16 PROCEDURE — 85610 PROTHROMBIN TIME: CPT

## 2022-06-16 NOTE — PROGRESS NOTES
Lab Results   Component Value Date    INR 1.90 (H) 06/16/2022    INR 2.15 (H) 06/02/2022    INR 2.4 (A) 05/18/2022    PROTIME 22.1 (H) 06/16/2022    PROTIME 24.4 (H) 06/02/2022    PROTIME 22.8 (H) 03/24/2022       INR: 1.9  Dose: 5mg  Range: 2.0-3.0

## 2022-06-16 NOTE — PROGRESS NOTES
Left a detailed message for the pt with new instructions. Asked that the pt call back and let us know that he received the instructions

## 2022-06-17 RX ORDER — DABIGATRAN ETEXILATE 150 MG/1
150 CAPSULE ORAL 2 TIMES DAILY
Qty: 180 CAPSULE | Refills: 3 | Status: SHIPPED | OUTPATIENT
Start: 2022-06-17 | End: 2022-06-23

## 2022-06-23 ENCOUNTER — ANTICOAGULATION VISIT (OUTPATIENT)
Dept: CARDIOLOGY | Facility: CLINIC | Age: 72
End: 2022-06-23

## 2022-06-23 ENCOUNTER — LAB (OUTPATIENT)
Dept: LAB | Facility: HOSPITAL | Age: 72
End: 2022-06-23

## 2022-06-23 DIAGNOSIS — I48.0 PAROXYSMAL ATRIAL FIBRILLATION: ICD-10-CM

## 2022-06-23 DIAGNOSIS — I48.0 PAROXYSMAL ATRIAL FIBRILLATION: Primary | ICD-10-CM

## 2022-06-23 LAB
INR PPP: 1.75 (ref 0.86–1.15)
PROTHROMBIN TIME: 20.7 SECONDS (ref 11.8–14.9)

## 2022-06-23 PROCEDURE — 85610 PROTHROMBIN TIME: CPT

## 2022-06-23 PROCEDURE — 36415 COLL VENOUS BLD VENIPUNCTURE: CPT

## 2022-06-23 RX ORDER — WARFARIN SODIUM 2 MG/1
2 TABLET ORAL DAILY
Qty: 180 TABLET | Refills: 6 | Status: SHIPPED | OUTPATIENT
Start: 2022-06-23

## 2022-06-23 NOTE — PROGRESS NOTES
Please help me clarify for pt... Pt says he was taking plavix 75mg AND warfin. Once he saw hes prevoius INR he decided to d/c plavix and was only taking warfarin 5 mg qd.    Pt should only be taking one of this blood thinners  Correct? And he should start taking 6 mg qd ?

## 2022-06-23 NOTE — PROGRESS NOTES
Lab Results   Component Value Date    INR 1.75 (H) 06/23/2022    INR 1.90 (H) 06/16/2022    INR 2.15 (H) 06/02/2022    PROTIME 20.7 (H) 06/23/2022    PROTIME 22.1 (H) 06/16/2022    PROTIME 24.4 (H) 06/02/2022     Pt taking 6mg /5 mg    Dx afib    Range 2.0 -3.0

## 2022-06-30 ENCOUNTER — PATIENT MESSAGE (OUTPATIENT)
Dept: CARDIOLOGY | Facility: CLINIC | Age: 72
End: 2022-06-30

## 2022-06-30 ENCOUNTER — LAB (OUTPATIENT)
Dept: LAB | Facility: HOSPITAL | Age: 72
End: 2022-06-30

## 2022-06-30 ENCOUNTER — ANTICOAGULATION VISIT (OUTPATIENT)
Dept: CARDIOLOGY | Facility: CLINIC | Age: 72
End: 2022-06-30

## 2022-06-30 DIAGNOSIS — I48.0 PAROXYSMAL ATRIAL FIBRILLATION: Primary | ICD-10-CM

## 2022-06-30 DIAGNOSIS — I48.0 PAROXYSMAL ATRIAL FIBRILLATION: ICD-10-CM

## 2022-06-30 LAB
INR PPP: 1.44 (ref 0.86–1.15)
PROTHROMBIN TIME: 17.7 SECONDS (ref 11.8–14.9)

## 2022-06-30 PROCEDURE — 36415 COLL VENOUS BLD VENIPUNCTURE: CPT

## 2022-06-30 PROCEDURE — 85610 PROTHROMBIN TIME: CPT

## 2022-07-06 ENCOUNTER — PATIENT MESSAGE (OUTPATIENT)
Dept: CARDIOLOGY | Facility: CLINIC | Age: 72
End: 2022-07-06

## 2022-07-08 ENCOUNTER — TELEPHONE (OUTPATIENT)
Dept: CARDIOLOGY | Facility: CLINIC | Age: 72
End: 2022-07-08

## 2022-07-08 NOTE — TELEPHONE ENCOUNTER
Returned pt call, on regards needing an order for INR ?  Pt said he has move to [facility]

## 2022-07-08 NOTE — TELEPHONE ENCOUNTER
----- Message from AMANDA Lee sent at 6/30/2022  3:41 PM EDT -----      ----- Message -----  From: Jacoby Lux  Sent: 6/30/2022   3:11 PM EDT  To: AMANDA Lee

## 2022-07-11 DIAGNOSIS — I48.0 PAROXYSMAL ATRIAL FIBRILLATION: Primary | ICD-10-CM

## 2022-07-11 NOTE — TELEPHONE ENCOUNTER
Pt called back to let us know that he still needs to check his INR, He has moved to [place] and is getting established with a new cardiologist next month, he still needs us to manage his INR. He requested to fax a lab order to [facility] fax number [phone]  Lab order has been faxed.

## 2022-07-13 ENCOUNTER — TELEPHONE (OUTPATIENT)
Dept: CARDIOLOGY | Facility: CLINIC | Age: 72
End: 2022-07-13

## 2022-07-13 ENCOUNTER — ANTICOAGULATION VISIT (OUTPATIENT)
Dept: CARDIOLOGY | Facility: CLINIC | Age: 72
End: 2022-07-13

## 2022-07-13 DIAGNOSIS — I48.0 PAROXYSMAL ATRIAL FIBRILLATION: Primary | ICD-10-CM

## 2022-07-13 LAB — INR PPP: 4.13

## 2022-07-13 NOTE — PROGRESS NOTES
Lab Results   Component Value Date    INR 4.13 07/13/2022    INR 1.44 (H) 06/30/2022    INR 1.75 (H) 06/23/2022    PROTIME 17.7 (H) 06/30/2022    PROTIME 20.7 (H) 06/23/2022    PROTIME 22.1 (H) 06/16/2022   DX:4.13  Range:2.0-3.0  Dose:7mg QD  [facility] [phone]

## 2022-07-13 NOTE — TELEPHONE ENCOUNTER
----- Message from AMANDA Lee sent at 7/13/2022  1:25 PM EDT -----      ----- Message -----  From: Ksenia Mccormick  Sent: 7/13/2022   1:25 PM EDT  To: AMANDA Lee

## 2022-07-21 DIAGNOSIS — E03.8 OTHER SPECIFIED HYPOTHYROIDISM: Primary | ICD-10-CM

## 2022-07-21 RX ORDER — LEVOTHYROXINE SODIUM 0.03 MG/1
37.5 TABLET ORAL DAILY
Qty: 135 TABLET | Refills: 2 | Status: SHIPPED | OUTPATIENT
Start: 2022-07-21

## 2022-07-21 NOTE — TELEPHONE ENCOUNTER
PT(PATIENT) WAS LAST SEEN    Office Visit with Ciro Bland MD (05/18/2022)    LAST MEDICATION REFILL  levothyroxine (SYNTHROID, LEVOTHROID) 25 MCG tablet (08/25/2021)    NOT ON ACTIVE MED LIST/NOT LISTED AS HISTORICAL    FINASTERIDE 5MG    PROVIDER PLEASE ADVISE

## 2022-07-21 NOTE — TELEPHONE ENCOUNTER
Caller: Victor Manuel Mendoza    Relationship: Self    Reji call back number: [phone]    Requested Prescriptions:   Requested Prescriptions     Pending Prescriptions Disp Refills   • levothyroxine (SYNTHROID, LEVOTHROID) 25 MCG tablet 135 tablet 3     Sig: Take 1.5 tablets by mouth Daily.    FINASTERIDE 5MG    Pharmacy where request should be sent: [id number] - [place], [place] - [address] AT [facility] [phone] [facility] [phone]      Additional details provided by patient: PATIENT DOES NOT HAVE THIS MEDICATION WITH HIM. HE HAS NOT ESTABLISHED CARE IN [place] YET. HE NEEDS AT LEAST A 14 DAY SUPPLY OF THIS MEDICATION. PLEASE CALL PRESCRIPTION INTO PHARMACY [facility].    HIS MEDICATION HE HAD IS IN [place].    Does the patient have less than a 3 day supply:  [x] Yes  [] No    Sukhdeep Goodrich Rep   07/21/22 15:38 EDT

## 2022-07-22 ENCOUNTER — ANTICOAGULATION VISIT (OUTPATIENT)
Dept: CARDIOLOGY | Facility: CLINIC | Age: 72
End: 2022-07-22

## 2022-07-22 ENCOUNTER — TELEPHONE (OUTPATIENT)
Dept: CARDIOLOGY | Facility: CLINIC | Age: 72
End: 2022-07-22

## 2022-07-22 DIAGNOSIS — I48.0 PAROXYSMAL ATRIAL FIBRILLATION: Primary | ICD-10-CM

## 2022-07-22 LAB — INR PPP: 2.4 (ref 2–3)

## 2022-07-22 PROCEDURE — 36416 COLLJ CAPILLARY BLOOD SPEC: CPT | Performed by: INTERNAL MEDICINE

## 2022-07-22 NOTE — PROGRESS NOTES
INR 2.4    Pt taking 6mg qd    Range 2.0-3.0    Dx Afib      Lab Results   Component Value Date    INR 4.13 07/13/2022    INR 1.44 (H) 06/30/2022    INR 1.75 (H) 06/23/2022    PROTIME 17.7 (H) 06/30/2022    PROTIME 20.7 (H) 06/23/2022    PROTIME 22.1 (H) 06/16/2022

## 2022-07-22 NOTE — TELEPHONE ENCOUNTER
Spoke to the patient, asked if he could send a remote transmission to us, so we could see if anything is going on. He states that the box that does the transmitting is in [place]. I asked if the dizziness last all day, he said a couple of hours & he does take his bp & his bp has been in normal range, pt is staying hydrated drinking at least 32 ounces of water a day, has not started any new medication that could make him dizzy. Offered the patient the choice of coming in to our office & we can check his device here. He declined, he does not think it's life threatening,  he was just curious if anything was going on, he will call me if it gets worse & we can bring him in to check it, he will also make sure he keeps the box with him now.

## 2022-07-22 NOTE — TELEPHONE ENCOUNTER
Caller: Victor Manuel Mendoza    Relationship: Self    Reji call back number: [phone]    What is the best time to reach you: ANYTIME FROM 7AM-11PM    Who are you requesting to speak with (clinical staff, provider,  specific staff member): CLINICAL     What was the call regarding: WOULD LIKE TO KNOW IF SOMEONE CAN REVIEW PACEMAKER RESULTS FROM THE LAST 6 DAYS TO SEE IF THEY SEE ANYTHING HAPPENING. HE HAS BEEN WAKING UP DIZZY FOR THE LAST 6 DAYS.    Do you require a callback: YES

## 2022-08-15 ENCOUNTER — ANTICOAGULATION VISIT (OUTPATIENT)
Dept: CARDIOLOGY | Facility: CLINIC | Age: 72
End: 2022-08-15

## 2022-08-15 DIAGNOSIS — I48.0 PAROXYSMAL ATRIAL FIBRILLATION: Primary | ICD-10-CM

## 2022-08-15 LAB — INR PPP: 3.8 (ref 2–3)

## 2022-08-15 PROCEDURE — 36416 COLLJ CAPILLARY BLOOD SPEC: CPT | Performed by: INTERNAL MEDICINE

## 2022-08-15 NOTE — PROGRESS NOTES
Lab Results   Component Value Date    INR 2.40 (A) 07/22/2022    INR 4.13 07/13/2022    INR 1.44 (H) 06/30/2022    PROTIME 17.7 (H) 06/30/2022    PROTIME 20.7 (H) 06/23/2022    PROTIME 22.1 (H) 06/16/2022     Pt taking 6 mg qd       INR done on 08/10/22    *3.84    Range 2.0-3.0    Dx Afib

## 2022-10-06 ENCOUNTER — TELEPHONE (OUTPATIENT)
Dept: CARDIOLOGY | Facility: CLINIC | Age: 72
End: 2022-10-06

## 2022-10-06 NOTE — TELEPHONE ENCOUNTER
LM to return call regarding overdue PT/INR results. It appears pt had last INR in [place] on 9-7 and was instructed to check on 9-14).

## 2023-02-21 RX ORDER — ATORVASTATIN CALCIUM 40 MG/1
TABLET, FILM COATED ORAL
Qty: 90 TABLET | Refills: 2 | OUTPATIENT
Start: 2023-02-21

## 2023-02-22 NOTE — TELEPHONE ENCOUNTER
I believe Mr. Mendoza is on pravastatin, rather than atorvastatin.  Can you contact him to confirm which statin he needs a refill for?

## 2023-02-23 NOTE — TELEPHONE ENCOUNTER
Left message for patient to return call to clinic.    HUB TO READ:  I believe Mr. Mendoza is on pravastatin, rather than atorvastatin.  Can you contact him to confirm which statin he needs a refill for?

## 2023-02-27 NOTE — TELEPHONE ENCOUNTER
Contacted pt to verify use of medications. Patient verified , and I asked if he was taking atorvastatin or pravastatin, he stated the request must have come from the pharmacy, the patient stated he has moved to [place] and is getting a primary care doctor in [place]. As of right now he will ask them to fill his medications. This task is an fyi, please done encounter if no information is needed.

## 2023-03-09 RX ORDER — SULFASALAZINE 500 MG/1
TABLET ORAL
Qty: 180 TABLET | Refills: 0 | OUTPATIENT
Start: 2023-03-09

## 2023-04-13 ENCOUNTER — DOCUMENTATION (OUTPATIENT)
Dept: CARDIOLOGY | Facility: CLINIC | Age: 73
End: 2023-04-13
Payer: MEDICARE

## 2023-11-03 ENCOUNTER — TRANSCRIBE ORDERS (OUTPATIENT)
Dept: ADMINISTRATIVE | Facility: HOSPITAL | Age: 73
End: 2023-11-03
Payer: MEDICARE

## 2023-11-03 DIAGNOSIS — I48.21 PERMANENT ATRIAL FIBRILLATION: Primary | ICD-10-CM

## 2023-11-03 DIAGNOSIS — I48.0 PAROXYSMAL ATRIAL FIBRILLATION: ICD-10-CM

## 2023-11-03 DIAGNOSIS — Z95.5 S/P CORONARY ARTERY STENT PLACEMENT: ICD-10-CM

## 2023-11-04 ENCOUNTER — TRANSCRIBE ORDERS (OUTPATIENT)
Dept: LAB | Facility: HOSPITAL | Age: 73
End: 2023-11-04
Payer: MEDICARE

## 2023-11-04 ENCOUNTER — LAB (OUTPATIENT)
Dept: LAB | Facility: HOSPITAL | Age: 73
End: 2023-11-04
Payer: MEDICARE

## 2023-11-04 DIAGNOSIS — I48.21 PERMANENT ATRIAL FIBRILLATION: ICD-10-CM

## 2023-11-04 DIAGNOSIS — I48.0 PAROXYSMAL ATRIAL FIBRILLATION: ICD-10-CM

## 2023-11-04 DIAGNOSIS — I48.21 PERMANENT ATRIAL FIBRILLATION: Primary | ICD-10-CM

## 2023-11-04 DIAGNOSIS — Z95.5 S/P CORONARY ARTERY STENT PLACEMENT: ICD-10-CM

## 2023-11-04 LAB
INR PPP: 2.4 (ref 0.86–1.15)
PROTHROMBIN TIME: 25.8 SECONDS (ref 11.8–14.9)

## 2023-11-04 PROCEDURE — 36415 COLL VENOUS BLD VENIPUNCTURE: CPT

## 2023-11-04 PROCEDURE — 85610 PROTHROMBIN TIME: CPT

## 2024-01-26 ENCOUNTER — OFFICE VISIT (OUTPATIENT)
Dept: SLEEP MEDICINE | Facility: HOSPITAL | Age: 74
End: 2024-01-26
Payer: MEDICARE

## 2024-01-26 VITALS
HEIGHT: 72 IN | WEIGHT: 190 LBS | BODY MASS INDEX: 25.73 KG/M2 | DIASTOLIC BLOOD PRESSURE: 63 MMHG | SYSTOLIC BLOOD PRESSURE: 103 MMHG | HEART RATE: 69 BPM | OXYGEN SATURATION: 100 %

## 2024-01-26 DIAGNOSIS — G47.33 OSA (OBSTRUCTIVE SLEEP APNEA): Primary | ICD-10-CM

## 2024-01-26 PROCEDURE — 3078F DIAST BP <80 MM HG: CPT | Performed by: INTERNAL MEDICINE

## 2024-01-26 PROCEDURE — 3074F SYST BP LT 130 MM HG: CPT | Performed by: INTERNAL MEDICINE

## 2024-01-26 PROCEDURE — G0463 HOSPITAL OUTPT CLINIC VISIT: HCPCS

## 2024-01-26 RX ORDER — CYCLOBENZAPRINE HCL 5 MG
TABLET ORAL AS NEEDED
COMMUNITY
Start: 2023-10-16

## 2024-01-26 RX ORDER — ALBUTEROL SULFATE 1.25 MG/3ML
1.25 SOLUTION RESPIRATORY (INHALATION) EVERY 4 HOURS PRN
COMMUNITY
Start: 2023-04-24 | End: 2024-04-23

## 2024-01-26 RX ORDER — TRIAMCINOLONE ACETONIDE 1 MG/G
OINTMENT TOPICAL
COMMUNITY
Start: 2024-01-05 | End: 2024-05-04

## 2024-01-26 RX ORDER — KETOCONAZOLE 20 MG/G
CREAM TOPICAL
COMMUNITY
Start: 2024-01-08

## 2024-01-26 RX ORDER — VALACYCLOVIR HYDROCHLORIDE 1 G/1
TABLET, FILM COATED ORAL
COMMUNITY
Start: 2024-01-11

## 2024-01-26 RX ORDER — METOPROLOL TARTRATE 50 MG/1
TABLET, FILM COATED ORAL
COMMUNITY
Start: 2023-12-13

## 2024-01-26 RX ORDER — CITALOPRAM 40 MG/1
40 TABLET ORAL DAILY
COMMUNITY
Start: 2023-09-10

## 2024-01-26 RX ORDER — ATORVASTATIN CALCIUM 80 MG/1
80 TABLET, FILM COATED ORAL DAILY
COMMUNITY
Start: 2023-12-07 | End: 2024-12-06

## 2024-01-26 RX ORDER — FINASTERIDE 5 MG/1
5 TABLET, FILM COATED ORAL DAILY
COMMUNITY
Start: 2023-11-04

## 2024-01-26 NOTE — PROGRESS NOTES
[facility]                          Chief Complaint:   F/up HODAN, HTN, CAD    History of present illness:   Subjective     HODAN:   PSG on 01/20/17 showed AHI 44/h.   CPAP titration showed adequate pressure of 9 cmH2O with residual AHI of 7.7.    The patient is currently on CPAP of 9.  He uses his CPAP regularly.  He  denies any issues with air leak or pressure intolerance.      He received a new CPAP in 2023.       Mask: FFM which does fit well.  No significant air leak or dry mouth  DME: [facility]    ESS: Total score: 4   HTN        PAST MEDICAL HISTORY:      1. Hypertension.      2. Coronary artery disease.      3. Hypothyroidism.      4. Arthritis.      5. Dyslipidemia.         PAST SURGICAL HISTORY:      1. Shoulder surgery in 2010.      2. Coronary angiography.         SOCIAL HISTORY:    Has not changed.  He does not smoke.  He drinks about two alcoholic beverages    a day.  He drinks two cups of coffee as well.  He denies illicit drug use.             Medication Review:     Current Outpatient Medications:     albuterol (ACCUNEB) 1.25 MG/3ML nebulizer solution, Inhale 3 mL Every 4 (Four) Hours As Needed., Disp: , Rfl:     atorvastatin (LIPITOR) 80 MG tablet, Take 1 tablet by mouth Daily., Disp: , Rfl:     citalopram (CeleXA) 40 MG tablet, Take 1 tablet by mouth Daily., Disp: , Rfl:     clopidogrel (PLAVIX) 75 MG tablet, Take 1 tablet by mouth Daily., Disp: 90 tablet, Rfl: 1    cyclobenzaprine (FLEXERIL) 5 MG tablet, As Needed., Disp: , Rfl:     finasteride (PROSCAR) 5 MG tablet, Take 1 tablet by mouth Daily., Disp: , Rfl:     Flaxseed, Linseed, (FLAX SEED OIL) 1300 MG capsule, Take  by mouth., Disp: , Rfl:     irbesartan (AVAPRO) 300 MG tablet, Take 1 tablet by mouth Daily. Hold til seen by pcp monitor bp at home, Disp: 90 tablet, Rfl: 3    ketoconazole (NIZORAL) 2 % cream, APPLY TWICE DAILY TO RASH ON FACE AND EARS TWICE A DAY FOR 2-4 WEEKS THEN AS NEEDED, Disp: , Rfl:      "levothyroxine (SYNTHROID, LEVOTHROID) 25 MCG tablet, Take 1.5 tablets by mouth Daily., Disp: 135 tablet, Rfl: 2    metoprolol succinate XL (TOPROL-XL) 25 MG 24 hr tablet, Take 1 tablet by mouth Daily., Disp: 30 tablet, Rfl: 11    metoprolol tartrate (LOPRESSOR) 50 MG tablet, TAKE ONE TABLET BY MOUTH THE NIGHT BEFORE PROCEDURE AND THE MORNING OF PROCEDURE, Disp: , Rfl:     Multiple Vitamin (MULTI-VITAMIN DAILY PO), Take  by mouth., Disp: , Rfl:     pravastatin (PRAVACHOL) 40 MG tablet, Take 1 tablet by mouth Daily., Disp: 90 tablet, Rfl: 2    triamcinolone (KENALOG) 0.1 % ointment, Apply  topically to the appropriate area as directed., Disp: , Rfl:     valACYclovir (VALTREX) 1000 MG tablet, TAKE 2 TABLETS BY MOUTH TWICE A DAY FOR 1 DAY MAY REPEAT WEEKLY, Disp: , Rfl:     vitamin B-12 (CYANOCOBALAMIN) 1000 MCG tablet, Take 1 tablet by mouth Daily., Disp: , Rfl:     warfarin (COUMADIN) 2 MG tablet, Take 1 tablet by mouth Daily., Disp: 180 tablet, Rfl: 6    Combigan 0.2-0.5 % ophthalmic solution, , Disp: , Rfl:     sulfaSALAzine (AZULFIDINE) 500 MG tablet, Take 1 tablet by mouth 2 (Two) Times a Day. (Patient not taking: Reported on 1/26/2024), Disp: 180 tablet, Rfl: 0      Objective   Vital Signs:  Vitals:    01/26/24 1300   BP: 103/63   Pulse: 69   SpO2: 100%   Weight: 86.2 kg (190 lb)   Height: 182.9 cm (72.01\")     Body mass index is 25.76 kg/m².          Physical Exam:   General Appearance:    Alert, cooperative, in no acute distress   ENMT:  Freidman score 3, long uvula   Neck:  Trachea midline. No thyromegaly.   Lungs:    Equal but diminished air entry bilaterally.  No crackles or wheezing.  Nonlabored breathing.    Heart:    Regular rhythm and normal rate, normal S1 and S2, no murmur.   Abdomen:     Soft.  No tenderness.  No HSM    Neuro:   Conscious, alert, oriented x3. Appropriate mood and affect.    Extremities:   Moves all extremities well, no edema, no cyanosis, no  redness              Diagnostic " data:    CPAP download showed:  Date: Last 40 days  Usage (days): 97%  Days used>4h: 97%  AHI: 1.9/h  Leak: 14 min   Usage: 8 h and 9 min  CPAP: 9 cm H2O    Lab Results   Component Value Date    HGBA1C 5.00 10/11/2021     Total Cholesterol   Date Value Ref Range Status   02/18/2022 170 0 - 200 mg/dL Final     Triglycerides   Date Value Ref Range Status   02/18/2022 78 0 - 150 mg/dL Final     HDL Cholesterol   Date Value Ref Range Status   02/18/2022 56 40 - 60 mg/dL Final     Hemoglobin   Date Value Ref Range Status   02/18/2022 12.6 (L) 13.0 - 17.7 g/dL Final     CO2   Date Value Ref Range Status   05/18/2022 21.3 (L) 22.0 - 29.0 mmol/L Final        Assessment   HODAN, on CPAP  Essential hypertension  Atrial fibrillation      PLAN:  Discussed the result of the download.   Patient is compliant with therapy and clinically benefit from treatment.  Patient was encouraged to continue using PAP.  Refill supplies.      Discussed the importance of Pap therapy in the setting of comorbid HTN and A. fib.  Continue same meds                  This note was dictated utilizing [employer] dictation

## 2025-02-21 ENCOUNTER — OFFICE VISIT (OUTPATIENT)
Dept: SLEEP MEDICINE | Facility: HOSPITAL | Age: 75
End: 2025-02-21
Payer: MEDICARE

## 2025-02-21 VITALS
HEART RATE: 59 BPM | OXYGEN SATURATION: 99 % | WEIGHT: 190 LBS | SYSTOLIC BLOOD PRESSURE: 108 MMHG | BODY MASS INDEX: 25.73 KG/M2 | DIASTOLIC BLOOD PRESSURE: 65 MMHG | HEIGHT: 72 IN

## 2025-02-21 DIAGNOSIS — G47.33 OSA (OBSTRUCTIVE SLEEP APNEA): Primary | ICD-10-CM

## 2025-02-21 PROCEDURE — 3078F DIAST BP <80 MM HG: CPT | Performed by: INTERNAL MEDICINE

## 2025-02-21 PROCEDURE — G0463 HOSPITAL OUTPT CLINIC VISIT: HCPCS

## 2025-02-21 PROCEDURE — 3074F SYST BP LT 130 MM HG: CPT | Performed by: INTERNAL MEDICINE

## 2025-02-21 RX ORDER — EZETIMIBE 10 MG/1
10 TABLET ORAL DAILY
COMMUNITY

## 2025-02-21 RX ORDER — ATORVASTATIN CALCIUM 80 MG/1
80 TABLET, FILM COATED ORAL DAILY
COMMUNITY

## 2025-02-21 RX ORDER — BUSPIRONE HYDROCHLORIDE 5 MG/1
TABLET ORAL
COMMUNITY

## 2025-02-21 NOTE — PROGRESS NOTES
[facility]                          Chief Complaint:   F/up HODAN, HTN, CAD    History of present illness:   Subjective     HODAN:   PSG 01/20/17: AHI= 44/h.   CPAP titration showed adequate pressure of 9 cmH2O with residual AHI of 7.7.    The patient is currently on CPAP of 9.  He uses his CPAP regularly.  He  denies any issues with air leak or pressure intolerance.      He received a new CPAP in 2023.       Mask: FFM which does fit well.  No significant air leak or dry mouth  DME: [facility]    ESS: Total score: 3   HTN        PAST MEDICAL HISTORY:      1. Hypertension.      2. Coronary artery disease.      3. Hypothyroidism.      4. Arthritis.      5. Dyslipidemia.         PAST SURGICAL HISTORY:      1. Shoulder surgery in 2010.      2. Coronary angiography.         SOCIAL HISTORY:    Has not changed.  He does not smoke.  He drinks about two alcoholic beverages    a day.  He drinks two cups of coffee as well.  He denies illicit drug use.             Medication Review:     Current Outpatient Medications:     atorvastatin (LIPITOR) 80 MG tablet, Take 1 tablet by mouth Daily., Disp: , Rfl:     busPIRone (BUSPAR) 5 MG tablet, take 1 tablet (5 mg) by mouth 2 (two) times a day, Disp: , Rfl:     citalopram (CeleXA) 40 MG tablet, Take 1 tablet by mouth Daily., Disp: , Rfl:     clopidogrel (PLAVIX) 75 MG tablet, Take 1 tablet by mouth Daily., Disp: 90 tablet, Rfl: 1    ezetimibe (ZETIA) 10 MG tablet, Take 1 tablet by mouth Daily., Disp: , Rfl:     finasteride (PROSCAR) 5 MG tablet, Take 1 tablet by mouth Daily., Disp: , Rfl:     Flaxseed, Linseed, (FLAX SEED OIL) 1300 MG capsule, Take  by mouth., Disp: , Rfl:     irbesartan (AVAPRO) 300 MG tablet, Take 1 tablet by mouth Daily. Hold til seen by pcp monitor bp at home, Disp: 90 tablet, Rfl: 3    ketoconazole (NIZORAL) 2 % cream, APPLY TWICE DAILY TO RASH ON FACE AND EARS TWICE A DAY FOR 2-4 WEEKS THEN AS NEEDED, Disp: , Rfl:     levothyroxine  "(SYNTHROID, LEVOTHROID) 25 MCG tablet, Take 1.5 tablets by mouth Daily., Disp: 135 tablet, Rfl: 2    metoprolol succinate XL (TOPROL-XL) 25 MG 24 hr tablet, Take 1 tablet by mouth Daily., Disp: 30 tablet, Rfl: 11    Multiple Vitamin (MULTI-VITAMIN DAILY PO), Take  by mouth., Disp: , Rfl:     pravastatin (PRAVACHOL) 40 MG tablet, Take 1 tablet by mouth Daily., Disp: 90 tablet, Rfl: 2    vitamin B-12 (CYANOCOBALAMIN) 1000 MCG tablet, Take 1 tablet by mouth Daily., Disp: , Rfl:     warfarin (COUMADIN) 2 MG tablet, Take 1 tablet by mouth Daily., Disp: 180 tablet, Rfl: 6    Combigan 0.2-0.5 % ophthalmic solution, , Disp: , Rfl:     cyclobenzaprine (FLEXERIL) 5 MG tablet, As Needed. (Patient not taking: Reported on 2/21/2025), Disp: , Rfl:     metoprolol tartrate (LOPRESSOR) 50 MG tablet, TAKE ONE TABLET BY MOUTH THE NIGHT BEFORE PROCEDURE AND THE MORNING OF PROCEDURE (Patient not taking: Reported on 2/21/2025), Disp: , Rfl:     sulfaSALAzine (AZULFIDINE) 500 MG tablet, Take 1 tablet by mouth 2 (Two) Times a Day. (Patient not taking: Reported on 2/21/2025), Disp: 180 tablet, Rfl: 0    valACYclovir (VALTREX) 1000 MG tablet, TAKE 2 TABLETS BY MOUTH TWICE A DAY FOR 1 DAY MAY REPEAT WEEKLY (Patient not taking: Reported on 2/21/2025), Disp: , Rfl:       Objective   Vital Signs:  Vitals:    02/21/25 1400   BP: 108/65   BP Location: Left arm   Pulse: 59   SpO2: 99%   Weight: 86.2 kg (190 lb)   Height: 182.9 cm (72.01\")     Body mass index is 25.76 kg/m².          Physical Exam:   General Appearance:    Alert, cooperative, in no acute distress   ENMT:  Freidman score 3, long uvula   Neck:  Trachea midline. No thyromegaly.   Lungs:    Equal but diminished air entry bilaterally.  No crackles or wheezing.  Nonlabored breathing.    Heart:    Regular rhythm and normal rate, normal S1 and S2, no murmur.   Abdomen:     Soft.  No tenderness.  No HSM    Neuro:   Conscious, alert, oriented x3. Appropriate mood and affect.    Extremities:   " Moves all extremities well, no edema, no cyanosis, no  redness              Diagnostic data:    CPAP download showed:  Date: Last 30 days  Usage (days): 100%  Days used>4h: 96%  AHI: 2/h  Leak: 0 min   Usage: 8 h and 20 min  CPAP: 9 cm H2O    Lab Results   Component Value Date    HGBA1C 5.00 10/11/2021     Total Cholesterol   Date Value Ref Range Status   02/18/2022 170 0 - 200 mg/dL Final     Triglycerides   Date Value Ref Range Status   02/18/2022 78 0 - 150 mg/dL Final     HDL Cholesterol   Date Value Ref Range Status   02/18/2022 56 40 - 60 mg/dL Final     Hemoglobin   Date Value Ref Range Status   02/18/2022 12.6 (L) 13.0 - 17.7 g/dL Final     CO2   Date Value Ref Range Status   05/18/2022 21.3 (L) 22.0 - 29.0 mmol/L Final        Assessment   HODAN, on CPAP  Essential hypertension  Atrial fibrillation  Overweight, BMI 25      PLAN:  Discussed the result of the download.   Patient is compliant with therapy and clinically benefit from treatment.  Patient was encouraged to continue using PAP.  Refill supplies.    Continue Warfarin, Metoprolol and Avapro. Discussed the importance of Pap therapy in the setting of comorbid HTN and A. fib.  Continue same meds.    Counseled for weight loss.  Encouraged to exercise regularly and cut down on carbohydrates.  Discussed that losing weight may decrease the severity of sleep apnea and obviate the need of CPAP therapy.     RTC 12 months.              This note was dictated utilizing Dragon dictation

## 2025-08-15 ENCOUNTER — OFFICE VISIT (OUTPATIENT)
Dept: SLEEP MEDICINE | Facility: HOSPITAL | Age: 75
End: 2025-08-15
Payer: MEDICARE

## 2025-08-15 VITALS
WEIGHT: 195.3 LBS | SYSTOLIC BLOOD PRESSURE: 121 MMHG | BODY MASS INDEX: 26.45 KG/M2 | HEIGHT: 72 IN | DIASTOLIC BLOOD PRESSURE: 51 MMHG | OXYGEN SATURATION: 98 % | HEART RATE: 63 BPM

## 2025-08-15 DIAGNOSIS — G47.33 OSA (OBSTRUCTIVE SLEEP APNEA): Primary | ICD-10-CM

## 2025-08-15 PROCEDURE — G0463 HOSPITAL OUTPT CLINIC VISIT: HCPCS

## 2025-08-15 RX ORDER — IRBESARTAN 150 MG/1
1 TABLET ORAL DAILY
COMMUNITY
Start: 2025-07-19

## 2025-08-15 RX ORDER — WARFARIN SODIUM 5 MG/1
5 TABLET ORAL DAILY
COMMUNITY
Start: 2025-05-13

## (undated) DEVICE — SENSR O2 OXIMAX FNGR ADHS A/ 1P/U

## (undated) DEVICE — MINI TREK CORONARY DILATATION CATHETER 2.0 MM X 12 MM / RAPID-EXCHANGE: Brand: MINI TREK

## (undated) DEVICE — CVR PROB ULTRASND GLS STRL

## (undated) DEVICE — CANNULA,OXY,ADULT,SUPER SOFT,W/14'TUB,UC: Brand: MEDLINE INDUSTRIES, INC.

## (undated) DEVICE — GW FC FLOP/TP .035 260CM 3MM

## (undated) DEVICE — SHT AIR TRANSFR COMFRT GLIDE LAT 40X80IN

## (undated) DEVICE — LOU PACE DEFIB: Brand: MEDLINE INDUSTRIES, INC.

## (undated) DEVICE — 6F .070 XB 3.5 100CM: Brand: VISTA BRITE TIP

## (undated) DEVICE — RUNTHROUGH NS EXTRA FLOPPY PTCA GUIDEWIRE: Brand: RUNTHROUGH

## (undated) DEVICE — TUBING,OXYGEN,CRUSH RES,7',CLEAR,UC: Brand: MEDLINE INDUSTRIES, INC.

## (undated) DEVICE — GW WWSS35145 WHOLEY WIRE V04: Brand: WHOLEY™

## (undated) DEVICE — DECANTER: Brand: UNBRANDED

## (undated) DEVICE — CATH F5INF TLPIGST145 110CM6SH: Brand: INFINITI

## (undated) DEVICE — TREK CORONARY DILATATION CATHETER 2.50 MM X 12 MM / RAPID-EXCHANGE: Brand: TREK

## (undated) DEVICE — CONTAINER,SPECIMEN,O.R.STRL,4.5OZ: Brand: MEDLINE

## (undated) DEVICE — SYR LL TP 10ML STRL

## (undated) DEVICE — PENCL ES MEGADINE EZ/CLEAN BUTN W/HOLSTR 10FT

## (undated) DEVICE — SYR LL 3ML 3CC 18GA 1 1/2IN

## (undated) DEVICE — INTRO SHEATH PRELUDE SNAP .038 6F 13CM W/SDPRT

## (undated) DEVICE — NC TREK™ CORONARY DILATATION CATHETER 2.25 MM X 15 MM / RAPID-EXCHANGE: Brand: NC TREK™

## (undated) DEVICE — GLIDESHEATH SLENDER STAINLESS STEEL KIT: Brand: GLIDESHEATH SLENDER

## (undated) DEVICE — BLD CLIP UNIV SURG GRY

## (undated) DEVICE — RADIFOCUS OPTITORQUE ANGIOGRAPHIC CATHETER: Brand: OPTITORQUE

## (undated) DEVICE — 6F .070 JR 4 100CM: Brand: CORDIS

## (undated) DEVICE — SOL NS 500ML

## (undated) DEVICE — CONTINU-FLO SOLUTION SET, 3 LUER ACTIVATED VALVES, MALE LUER LOCK ADAPTER WITH RETRACTABLE COLLAR: Brand: CLEARLINK/CONTINU-FLO